# Patient Record
Sex: FEMALE | Race: WHITE | Employment: OTHER | URBAN - METROPOLITAN AREA
[De-identification: names, ages, dates, MRNs, and addresses within clinical notes are randomized per-mention and may not be internally consistent; named-entity substitution may affect disease eponyms.]

---

## 2017-01-12 ENCOUNTER — TELEPHONE (OUTPATIENT)
Dept: INTERNAL MEDICINE CLINIC | Age: 47
End: 2017-01-12

## 2017-01-12 DIAGNOSIS — Z00.00 PHYSICAL EXAM: Primary | ICD-10-CM

## 2017-01-26 ENCOUNTER — DOCUMENTATION ONLY (OUTPATIENT)
Dept: INTERNAL MEDICINE CLINIC | Age: 47
End: 2017-01-26

## 2017-01-31 ENCOUNTER — DOCUMENTATION ONLY (OUTPATIENT)
Dept: INTERNAL MEDICINE CLINIC | Age: 47
End: 2017-01-31

## 2017-05-14 DIAGNOSIS — G43.009 MIGRAINE WITHOUT AURA AND WITHOUT STATUS MIGRAINOSUS, NOT INTRACTABLE: ICD-10-CM

## 2017-05-15 ENCOUNTER — DOCUMENTATION ONLY (OUTPATIENT)
Dept: NEUROLOGY | Age: 47
End: 2017-05-15

## 2017-05-15 RX ORDER — PROPRANOLOL HYDROCHLORIDE 160 MG/1
CAPSULE, EXTENDED RELEASE ORAL
Qty: 30 CAP | Refills: 0 | Status: SHIPPED | OUTPATIENT
Start: 2017-05-15 | End: 2017-07-03 | Stop reason: SDUPTHER

## 2017-05-16 ENCOUNTER — TELEPHONE (OUTPATIENT)
Dept: INTERNAL MEDICINE CLINIC | Age: 47
End: 2017-05-16

## 2017-05-16 DIAGNOSIS — G43.009 MIGRAINE WITHOUT AURA AND WITHOUT STATUS MIGRAINOSUS, NOT INTRACTABLE: Primary | ICD-10-CM

## 2017-05-16 NOTE — TELEPHONE ENCOUNTER
PT wants  Dr to  referral to Dr Shaan Renteria for headaches.  She does not need an insurance referral

## 2017-05-23 ENCOUNTER — DOCUMENTATION ONLY (OUTPATIENT)
Dept: NEUROLOGY | Age: 47
End: 2017-05-23

## 2017-05-23 NOTE — PROGRESS NOTES
Pt did not need a new referral.  Did contact PCP to discuss symptoms. Made appointment with Dr. Tamica Devlin on May 24th.  Pt informed-tmb

## 2017-05-24 ENCOUNTER — OFFICE VISIT (OUTPATIENT)
Dept: NEUROLOGY | Age: 47
End: 2017-05-24

## 2017-05-24 VITALS
RESPIRATION RATE: 14 BRPM | TEMPERATURE: 98.8 F | DIASTOLIC BLOOD PRESSURE: 80 MMHG | SYSTOLIC BLOOD PRESSURE: 104 MMHG | HEART RATE: 87 BPM | OXYGEN SATURATION: 97 % | HEIGHT: 65 IN

## 2017-05-24 DIAGNOSIS — G43.711 INTRACTABLE CHRONIC MIGRAINE WITHOUT AURA AND WITH STATUS MIGRAINOSUS: ICD-10-CM

## 2017-05-24 DIAGNOSIS — G43.009 MIGRAINE WITHOUT AURA AND WITHOUT STATUS MIGRAINOSUS, NOT INTRACTABLE: Primary | ICD-10-CM

## 2017-05-24 RX ORDER — FLUTICASONE PROPIONATE 50 MCG
2 SPRAY, SUSPENSION (ML) NASAL DAILY
COMMUNITY
End: 2019-04-16

## 2017-05-24 NOTE — COMMUNICATION BODY
Re:  Jess Roy,Follow up visit     5/24/2017 10:08 AM      SSN: [id number]    Subjective:   Qiana Farley returns for follow up after her recent Botox injection for migraine. She noted a couple of rough weeks after the Botox injection, which is typical for her. Now she has a very mild headache. She's been getting about 1 headache per week after the Botox injections. She's been dragging her feet to re-inject for the headaches. Now a year out, she's getting 9-10 headaches per month, some of them multiple days per event. She's been getting greater than 15 headache days per month and they are disabling. She's noticed no improvement in the headaches with the increase in Inderal LA dosing. Medications:    Current Outpatient Prescriptions   Medication Sig Dispense Refill    HYDROCODONE/ACETAMINOPHEN (VICODIN PO) Take  by mouth. For current dental work      fluticasone (FLONASE) 50 mcg/actuation nasal spray 2 Sprays by Both Nostrils route daily.  propranolol LA (INDERAL LA) 160 mg capsule TAKE 1 CAPSULE BY MOUTH DAILY 30 Cap 0    DEXILANT 60 mg CpDB TAKE 1 CAPSULE BY MOUTH DAILY 90 Cap 3    ondansetron (ZOFRAN ODT) 4 mg disintegrating tablet Take 1-2 tablets every 6-8 hours as needed for nausea and vomiting. 10 Tab 0    rizatriptan (MAXALT) 10 mg tablet Take 1 Tab by mouth once as needed for Migraine for up to 1 dose. May repeat in 2 hours if needed 12 Tab 6    multivitamin (ONE A DAY) tablet Take 1 Tab by mouth daily.  PROGESTERONE 400 mg by Does Not Apply route. In the pm      Cetirizine 10 mg cap Take  by mouth.  zolpidem (AMBIEN) 10 mg tablet Take  by mouth nightly as needed for Sleep.  ibuprofen (MOTRIN) 200 mg tablet Take  by mouth.  TESTOSTERONE CREAM Apply 2.5 mg to affected area every other day. Testosterone Cream      estradiol (MINIVELLE) 0.0375 mg/24 hr 1 Patch by TransDERmal route every Monday and Friday.       OMEPRAZOLE MAGNESIUM (PRILOSEC OTC PO) Take  by mouth.  melatonin 3 mg tablet Take  by mouth.  thyroid, Pork, (ARMOUR THYROID) 60 mg tablet Take 60 mg by mouth daily.  docusate sodium (COLACE) 100 mg capsule Take 1,600 mg by mouth two (2) times a day. 6 in the am and 10 at night      CALCIUM PO Take 500 mg by mouth three (3) times daily.  Cholecalciferol, Vitamin D3, (VITAMIN D) 2,000 unit Cap Take 1,000 Units by mouth.  dextroamphetamine-amphetamine (ADDERALL) 10 mg tablet Take 10 mg by mouth.  acetaminophen (TYLENOL) 325 mg tablet Take  by mouth every four (4) hours as needed for Pain.  sertraline (ZOLOFT) 100 mg tablet Take 50 mg by mouth daily. Vital signs:    Visit Vitals    /80 (BP 1 Location: Right arm, BP Patient Position: Sitting)    Pulse 87    Temp 98.8 °F (37.1 °C) (Oral)    Resp 14    Ht 5' 5\" (1.651 m)    SpO2 97%       Review of Systems:   As above otherwise 11 point review of systems negative including;   Constitutional no fever or chills  Skin denies rash or itching  HEENT  Denies tinnitus, hearing lose  Eyes denies diplopia vision lose  Respiratory denies sortness of breath  Cardiovascular denies chest pain, dyspnea on exertion  Gastrointestinal denies nausea, vomiting, diarrhea, constipation  Genitourinary denies incontinence  Musculoskeletal denies joint pain or swelling  Endocrine denies weight change  Hematology denies easy bruising or bleeding   Neurological as above in HPI      Patient Active Problem List   Diagnosis Code    Bulimia Dr. Valerie Valentine G43.909    DJD knees Dr. Hiram Cardoza M19.90    Osteopenia 2/13 FRAX 3.0 and 0.3  M85.80    GERD without esophagitis K21.9    Bilateral leg edema R60.0    Personal history of tobacco use, presenting hazards to health Z87.891    Asymptomatic varicose veins I83.90         Objective: The patient is awake, alert, and oriented x 4. Fund of knowledge is adequate.   Speech is fluent and memory is intact. Cranial Nerves: II - Visual fields are full to confrontation. III, IV, VI - Extraocular movements are intact. There is no nystagmus. V - Facial sensation is intact to pinprick. VII - Face is symmetrical.  VIII - Hearing is present. IX, X, XII - Palate is symmetrical.   XI - Shoulder shrugging and head turning intact  Motor: The patient moves all four limbs fairly well and symmetrically. Tone is normal. Reflexes are 2+ and symmetrical. Plantars are down going. Gait is normal.    CBC:   Lab Results   Component Value Date/Time    WBC 5.5 06/30/2016 11:25 AM    RBC 4.00 06/30/2016 11:25 AM    HGB 12.5 06/30/2016 11:25 AM    HCT 39.4 06/30/2016 11:25 AM    PLATELET 805 03/93/2025 11:25 AM     BMP:   Lab Results   Component Value Date/Time    Glucose 79 06/30/2016 11:25 AM    Sodium 144 06/30/2016 11:25 AM    Potassium 4.5 06/30/2016 11:25 AM    Chloride 103 06/30/2016 11:25 AM    CO2 30 06/30/2016 11:25 AM    BUN 13 06/30/2016 11:25 AM    Creatinine 0.7 06/30/2016 11:25 AM    Calcium 9.3 06/30/2016 11:25 AM     CMP:   Lab Results   Component Value Date/Time    Glucose 79 06/30/2016 11:25 AM    Sodium 144 06/30/2016 11:25 AM    Potassium 4.5 06/30/2016 11:25 AM    Chloride 103 06/30/2016 11:25 AM    CO2 30 06/30/2016 11:25 AM    BUN 13 06/30/2016 11:25 AM    Creatinine 0.7 06/30/2016 11:25 AM    Calcium 9.3 06/30/2016 11:25 AM    Anion gap 11.0 06/30/2016 11:25 AM    BUN/Creatinine ratio 20 03/22/2013 11:10 AM    Alk. phosphatase 35 06/30/2016 11:25 AM    Protein, total 6.5 06/30/2016 11:25 AM    Albumin 4.6 06/30/2016 11:25 AM    Globulin 1.9 06/30/2016 11:25 AM    A-G Ratio 2.4 06/30/2016 11:25 AM     Coagulation: No results found for: PTP, INR, APTT, PTTT    Assessment:  Migraine, doing well with Botox injections and Inderal LA. Plan: Will switch from Imitrex to Maxalt per patient request.  Will re-inject with Botox at this time. Return 3 weeks after the injection. Sincerely,        Anuja Camejo.  Dot Hernandez M.D.

## 2017-05-24 NOTE — LETTER
5/24/2017 10:16 AM 
 
Patient:  Mariajose Weiss YOB: 1970 Date of Visit: 5/24/2017 Dear MD Adithya Arreola[address] [address] VIA In Basket 
 : Thank you for referring Ms. Eneida Wadsworth to me for evaluation/treatment. Below are the relevant portions of my assessment and plan of care. Re:  Sahil Roy,Follow up visit     5/24/2017 10:08 AM 
 
 
SSN: [id number] Subjective:   Mariajose Weiss returns for follow up after her recent Botox injection for migraine. She noted a couple of rough weeks after the Botox injection, which is typical for her. Now she has a very mild headache. She's been getting about 1 headache per week after the Botox injections. She's been dragging her feet to re-inject for the headaches. Now a year out, she's getting 9-10 headaches per month, some of them multiple days per event. She's been getting greater than 15 headache days per month and they are disabling. She's noticed no improvement in the headaches with the increase in Inderal LA dosing. Medications:   
Current Outpatient Prescriptions Medication Sig Dispense Refill  
 HYDROCODONE/ACETAMINOPHEN (VICODIN PO) Take  by mouth. For current dental work  fluticasone (FLONASE) 50 mcg/actuation nasal spray 2 Sprays by Both Nostrils route daily.  propranolol LA (INDERAL LA) 160 mg capsule TAKE 1 CAPSULE BY MOUTH DAILY 30 Cap 0  DEXILANT 60 mg CpDB TAKE 1 CAPSULE BY MOUTH DAILY 90 Cap 3  
 ondansetron (ZOFRAN ODT) 4 mg disintegrating tablet Take 1-2 tablets every 6-8 hours as needed for nausea and vomiting. 10 Tab 0  
 rizatriptan (MAXALT) 10 mg tablet Take 1 Tab by mouth once as needed for Migraine for up to 1 dose. May repeat in 2 hours if needed 12 Tab 6  
 multivitamin (ONE A DAY) tablet Take 1 Tab by mouth daily.  PROGESTERONE 400 mg by Does Not Apply route.  In the pm    
  Cetirizine 10 mg cap Take  by mouth.  zolpidem (AMBIEN) 10 mg tablet Take  by mouth nightly as needed for Sleep.  ibuprofen (MOTRIN) 200 mg tablet Take  by mouth.  TESTOSTERONE CREAM Apply 2.5 mg to affected area every other day. Testosterone Cream    
 estradiol (MINIVELLE) 0.0375 mg/24 hr 1 Patch by TransDERmal route every Monday and Friday.  OMEPRAZOLE MAGNESIUM (PRILOSEC OTC PO) Take  by mouth.  melatonin 3 mg tablet Take  by mouth.  thyroid, Pork, (ARMOUR THYROID) 60 mg tablet Take 60 mg by mouth daily.  docusate sodium (COLACE) 100 mg capsule Take 1,600 mg by mouth two (2) times a day. 6 in the am and 10 at night  CALCIUM PO Take 500 mg by mouth three (3) times daily.  Cholecalciferol, Vitamin D3, (VITAMIN D) 2,000 unit Cap Take 1,000 Units by mouth.  dextroamphetamine-amphetamine (ADDERALL) 10 mg tablet Take 10 mg by mouth.  acetaminophen (TYLENOL) 325 mg tablet Take  by mouth every four (4) hours as needed for Pain.  sertraline (ZOLOFT) 100 mg tablet Take 50 mg by mouth daily. Vital signs:   
Visit Vitals  /80 (BP 1 Location: Right arm, BP Patient Position: Sitting)  Pulse 87  Temp 98.8 °F (37.1 °C) (Oral)  Resp 14  
 Ht 5' 5\" (1.651 m)  SpO2 97% Review of Systems: As above otherwise 11 point review of systems negative including;  
Constitutional no fever or chills Skin denies rash or itching HEENT  Denies tinnitus, hearing lose Eyes denies diplopia vision lose Respiratory denies sortness of breath Cardiovascular denies chest pain, dyspnea on exertion Gastrointestinal denies nausea, vomiting, diarrhea, constipation Genitourinary denies incontinence Musculoskeletal denies joint pain or swelling Endocrine denies weight change Hematology denies easy bruising or bleeding Neurological as above in HPI Patient Active Problem List  
Diagnosis Code  Mariano Kerr Noss F50.2 Heriberto [place] Dr. Aleman retirement J18.784  DJD knees Dr. Ana Maria Alarcon M19.90  
 Osteopenia 2/13 FRAX 3.0 and 0.3  M85.80  GERD without esophagitis K21.9  Bilateral leg edema R60.0  Personal history of tobacco use, presenting hazards to health X30.035  Asymptomatic varicose veins I83.90 Objective: The patient is awake, alert, and oriented x 4. Fund of knowledge is adequate. Speech is fluent and memory is intact. Cranial Nerves: II  Visual fields are full to confrontation. III, IV, VI  Extraocular movements are intact. There is no nystagmus. V  Facial sensation is intact to pinprick. VII  Face is symmetrical.  VIII - Hearing is present. IX, X, XII  Palate is symmetrical.   XI - Shoulder shrugging and head turning intact Motor: The patient moves all four limbs fairly well and symmetrically. Tone is normal. Reflexes are 2+ and symmetrical. Plantars are down going. Gait is normal. 
 
CBC:  
Lab Results Component Value Date/Time WBC 5.5 06/30/2016 11:25 AM  
 RBC 4.00 06/30/2016 11:25 AM  
 HGB 12.5 06/30/2016 11:25 AM  
 HCT 39.4 06/30/2016 11:25 AM  
 PLATELET 792 50/06/7673 11:25 AM  
 
BMP:  
Lab Results Component Value Date/Time Glucose 79 06/30/2016 11:25 AM  
 Sodium 144 06/30/2016 11:25 AM  
 Potassium 4.5 06/30/2016 11:25 AM  
 Chloride 103 06/30/2016 11:25 AM  
 CO2 30 06/30/2016 11:25 AM  
 BUN 13 06/30/2016 11:25 AM  
 Creatinine 0.7 06/30/2016 11:25 AM  
 Calcium 9.3 06/30/2016 11:25 AM  
 
CMP:  
Lab Results Component Value Date/Time Glucose 79 06/30/2016 11:25 AM  
 Sodium 144 06/30/2016 11:25 AM  
 Potassium 4.5 06/30/2016 11:25 AM  
 Chloride 103 06/30/2016 11:25 AM  
 CO2 30 06/30/2016 11:25 AM  
 BUN 13 06/30/2016 11:25 AM  
 Creatinine 0.7 06/30/2016 11:25 AM  
 Calcium 9.3 06/30/2016 11:25 AM  
 Anion gap 11.0 06/30/2016 11:25 AM  
 BUN/Creatinine ratio 20 03/22/2013 11:10 AM  
 Alk.  phosphatase 35 06/30/2016 11:25 AM  
 Protein, total 6.5 06/30/2016 11:25 AM  
 Albumin 4.6 06/30/2016 11:25 AM  
 Globulin 1.9 06/30/2016 11:25 AM  
 A-G Ratio 2.4 06/30/2016 11:25 AM  
 
Coagulation: No results found for: PTP, INR, APTT, PTTT Assessment:  Migraine, doing well with Botox injections and Inderal LA. Plan: Will switch from Imitrex to Maxalt per patient request.  Will re-inject with Botox at this time. Return 3 weeks after the injection. Sincerely, 
 
 
 
Cholo Koroma. Greta Fajardo M.D. If you have questions, please do not hesitate to call me. I look forward to following Ms. Allie Sena along with you.  
 
 
 
Sincerely, 
 
 
Nguyen Nuñez MD

## 2017-05-24 NOTE — PROGRESS NOTES
Re:  Kyle Roy,Follow up visit     5/24/2017 10:08 AM      SSN: [id number]    Subjective:   Eva Layton returns for follow up after her recent Botox injection for migraine. She noted a couple of rough weeks after the Botox injection, which is typical for her. Now she has a very mild headache. She's been getting about 1 headache per week after the Botox injections. She's been dragging her feet to re-inject for the headaches. Now a year out, she's getting 9-10 headaches per month, some of them multiple days per event. She's been getting greater than 15 headache days per month and they are disabling. She's noticed no improvement in the headaches with the increase in Inderal LA dosing. Medications:    Current Outpatient Prescriptions   Medication Sig Dispense Refill    HYDROCODONE/ACETAMINOPHEN (VICODIN PO) Take  by mouth. For current dental work      fluticasone (FLONASE) 50 mcg/actuation nasal spray 2 Sprays by Both Nostrils route daily.  propranolol LA (INDERAL LA) 160 mg capsule TAKE 1 CAPSULE BY MOUTH DAILY 30 Cap 0    DEXILANT 60 mg CpDB TAKE 1 CAPSULE BY MOUTH DAILY 90 Cap 3    ondansetron (ZOFRAN ODT) 4 mg disintegrating tablet Take 1-2 tablets every 6-8 hours as needed for nausea and vomiting. 10 Tab 0    rizatriptan (MAXALT) 10 mg tablet Take 1 Tab by mouth once as needed for Migraine for up to 1 dose. May repeat in 2 hours if needed 12 Tab 6    multivitamin (ONE A DAY) tablet Take 1 Tab by mouth daily.  PROGESTERONE 400 mg by Does Not Apply route. In the pm      Cetirizine 10 mg cap Take  by mouth.  zolpidem (AMBIEN) 10 mg tablet Take  by mouth nightly as needed for Sleep.  ibuprofen (MOTRIN) 200 mg tablet Take  by mouth.  TESTOSTERONE CREAM Apply 2.5 mg to affected area every other day. Testosterone Cream      estradiol (MINIVELLE) 0.0375 mg/24 hr 1 Patch by TransDERmal route every Monday and Friday.       OMEPRAZOLE MAGNESIUM (PRILOSEC OTC PO) Take  by mouth.  melatonin 3 mg tablet Take  by mouth.  thyroid, Pork, (ARMOUR THYROID) 60 mg tablet Take 60 mg by mouth daily.  docusate sodium (COLACE) 100 mg capsule Take 1,600 mg by mouth two (2) times a day. 6 in the am and 10 at night      CALCIUM PO Take 500 mg by mouth three (3) times daily.  Cholecalciferol, Vitamin D3, (VITAMIN D) 2,000 unit Cap Take 1,000 Units by mouth.  dextroamphetamine-amphetamine (ADDERALL) 10 mg tablet Take 10 mg by mouth.  acetaminophen (TYLENOL) 325 mg tablet Take  by mouth every four (4) hours as needed for Pain.  sertraline (ZOLOFT) 100 mg tablet Take 50 mg by mouth daily. Vital signs:    Visit Vitals    /80 (BP 1 Location: Right arm, BP Patient Position: Sitting)    Pulse 87    Temp 98.8 °F (37.1 °C) (Oral)    Resp 14    Ht 5' 5\" (1.651 m)    SpO2 97%       Review of Systems:   As above otherwise 11 point review of systems negative including;   Constitutional no fever or chills  Skin denies rash or itching  HEENT  Denies tinnitus, hearing lose  Eyes denies diplopia vision lose  Respiratory denies sortness of breath  Cardiovascular denies chest pain, dyspnea on exertion  Gastrointestinal denies nausea, vomiting, diarrhea, constipation  Genitourinary denies incontinence  Musculoskeletal denies joint pain or swelling  Endocrine denies weight change  Hematology denies easy bruising or bleeding   Neurological as above in HPI      Patient Active Problem List   Diagnosis Code    Bulimia Dr. Marlene Aase Dr. [facility] G43.909    DJD knees Dr. Ana Maria Alarcon M19.90    Osteopenia 2/13 FRAX 3.0 and 0.3  M85.80    GERD without esophagitis K21.9    Bilateral leg edema R60.0    Personal history of tobacco use, presenting hazards to health Z87.891    Asymptomatic varicose veins I83.90         Objective: The patient is awake, alert, and oriented x 4. Fund of knowledge is adequate.   Speech is fluent and memory is intact. Cranial Nerves: II - Visual fields are full to confrontation. III, IV, VI - Extraocular movements are intact. There is no nystagmus. V - Facial sensation is intact to pinprick. VII - Face is symmetrical.  VIII - Hearing is present. IX, X, XII - Palate is symmetrical.   XI - Shoulder shrugging and head turning intact  Motor: The patient moves all four limbs fairly well and symmetrically. Tone is normal. Reflexes are 2+ and symmetrical. Plantars are down going. Gait is normal.    CBC:   Lab Results   Component Value Date/Time    WBC 5.5 06/30/2016 11:25 AM    RBC 4.00 06/30/2016 11:25 AM    HGB 12.5 06/30/2016 11:25 AM    HCT 39.4 06/30/2016 11:25 AM    PLATELET 804 81/98/5656 11:25 AM     BMP:   Lab Results   Component Value Date/Time    Glucose 79 06/30/2016 11:25 AM    Sodium 144 06/30/2016 11:25 AM    Potassium 4.5 06/30/2016 11:25 AM    Chloride 103 06/30/2016 11:25 AM    CO2 30 06/30/2016 11:25 AM    BUN 13 06/30/2016 11:25 AM    Creatinine 0.7 06/30/2016 11:25 AM    Calcium 9.3 06/30/2016 11:25 AM     CMP:   Lab Results   Component Value Date/Time    Glucose 79 06/30/2016 11:25 AM    Sodium 144 06/30/2016 11:25 AM    Potassium 4.5 06/30/2016 11:25 AM    Chloride 103 06/30/2016 11:25 AM    CO2 30 06/30/2016 11:25 AM    BUN 13 06/30/2016 11:25 AM    Creatinine 0.7 06/30/2016 11:25 AM    Calcium 9.3 06/30/2016 11:25 AM    Anion gap 11.0 06/30/2016 11:25 AM    BUN/Creatinine ratio 20 03/22/2013 11:10 AM    Alk. phosphatase 35 06/30/2016 11:25 AM    Protein, total 6.5 06/30/2016 11:25 AM    Albumin 4.6 06/30/2016 11:25 AM    Globulin 1.9 06/30/2016 11:25 AM    A-G Ratio 2.4 06/30/2016 11:25 AM     Coagulation: No results found for: PTP, INR, APTT, PTTT    Assessment:  Migraine, doing well with Botox injections and Inderal LA. Plan: Will switch from Imitrex to Maxalt per patient request.  Will re-inject with Botox at this time. Return 3 weeks after the injection. Sincerely,        Madelyn Aguillon M.D.

## 2017-06-12 ENCOUNTER — OFFICE VISIT (OUTPATIENT)
Dept: INTERNAL MEDICINE CLINIC | Age: 47
End: 2017-06-12

## 2017-06-12 VITALS
HEIGHT: 65 IN | SYSTOLIC BLOOD PRESSURE: 120 MMHG | TEMPERATURE: 98.7 F | OXYGEN SATURATION: 98 % | DIASTOLIC BLOOD PRESSURE: 70 MMHG | HEART RATE: 68 BPM

## 2017-06-12 DIAGNOSIS — M19.90 OSTEOARTHRITIS, UNSPECIFIED OSTEOARTHRITIS TYPE, UNSPECIFIED SITE: ICD-10-CM

## 2017-06-12 DIAGNOSIS — M85.80 OSTEOPENIA, UNSPECIFIED LOCATION: ICD-10-CM

## 2017-06-12 DIAGNOSIS — R07.89 RIGHT-SIDED CHEST WALL PAIN: Primary | ICD-10-CM

## 2017-06-12 NOTE — MR AVS SNAPSHOT
Visit Information Date & Time Provider Department Dept. Phone Encounter #  
 6/12/2017  4:00 PM Philly Verdema Internist of [address] [id number] Your Appointments 7/10/2017 10:30 AM  
PHYSICAL with Didi Ibrahim MD  
Internist of [address]) Appt Note: CPE  
 [address], [address] [facility] [address] [place] [address] [place]  
  
   
 [address], [facility] Upcoming Health Maintenance Date Due DTaP/Tdap/Td series (1 - Tdap) 10/8/1989 PAP AKA CERVICAL CYTOLOGY 3/17/2017 INFLUENZA AGE 9 TO ADULT 8/1/2017 COLONOSCOPY 9/19/2026 Allergies as of 6/12/2017  Review Complete On: 6/12/2017 By: VANESA Verde Severity Noted Reaction Type Reactions Lexapro [Escitalopram]  02/13/2012    Other (comments) Sexual prob Current Immunizations  Reviewed on 3/25/2013 Name Date Hepatitis B Vaccine 1/1/1999 Influenza Vaccine 1/9/2013 Influenza Vaccine PF 10/7/2014 Influenza Vaccine Whole 1/1/2009 Not reviewed this visit You Were Diagnosed With   
  
 Codes Comments Right-sided chest wall pain    -  Primary ICD-10-CM: R07.89 ICD-9-CM: 786.52 Vitals BP Pulse Temp Height(growth percentile) SpO2 OB Status 120/70 68 98.7 °F (37.1 °C) (Oral) 5' 5\" (1.651 m) 98% IUD Smoking Status Former Smoker Vitals History [phone] [facility] [address], [address] [phone] Your Updated Medication List  
  
   
This list is accurate as of: 6/12/17  4:36 PM.  Always use your most recent med list.  
  
  
  
  
 acetaminophen 325 mg tablet Commonly known as:  TYLENOL Take  by mouth every four (4) hours as needed for Pain. ARMOUR THYROID 60 mg tablet Generic drug:  thyroid (Pork) Take 60 mg by mouth daily. CALCIUM PO Take 500 mg by mouth three (3) times daily. Cetirizine 10 mg Cap Take  by mouth. COLACE 100 mg capsule Generic drug:  docusate sodium Take 1,600 mg by mouth two (2) times a day. 6 in the am and 10 at night DEXILANT 60 mg Cpdb Generic drug:  Dexlansoprazole TAKE 1 CAPSULE BY MOUTH DAILY  
  
 dextroamphetamine-amphetamine 10 mg tablet Commonly known as:  ADDERALL Take 10 mg by mouth. FLONASE 50 mcg/actuation nasal spray Generic drug:  fluticasone 2 Sprays by Both Nostrils route daily. ibuprofen 200 mg tablet Commonly known as:  MOTRIN Take  by mouth.  
  
 melatonin 3 mg tablet Take  by mouth. MINIVELLE 0.0375 mg/24 hr  
Generic drug:  estradiol 1 Patch by TransDERmal route every Monday and Friday. multivitamin tablet Commonly known as:  ONE A DAY Take 1 Tab by mouth daily. ondansetron 4 mg disintegrating tablet Commonly known as:  ZOFRAN ODT Take 1-2 tablets every 6-8 hours as needed for nausea and vomiting. PRILOSEC OTC PO Take  by mouth. PROGESTERONE  
400 mg by Does Not Apply route. In the pm  
  
 propranolol  mg capsule Commonly known as:  INDERAL LA  
TAKE 1 CAPSULE BY MOUTH DAILY  
  
 rizatriptan 10 mg tablet Commonly known as:  [place] Bolds Take 1 Tab by mouth once as needed for Migraine for up to 1 dose. May repeat in 2 hours if needed TESTOSTERONE CREAM  
Apply 2.5 mg to affected area every other day. Testosterone Cream  
  
 VITAMIN D 2,000 unit Cap capsule Generic drug:  Cholecalciferol (Vitamin D3) Take 1,000 Units by mouth. ZOLOFT 100 mg tablet Generic drug:  sertraline Take 50 mg by mouth daily. zolpidem 10 mg tablet Commonly known as:  AMBIEN Take  by mouth nightly as needed for Sleep. To-Do List   
 06/12/2017 Imaging:  XR RIBS BI W PA CHEST 4 VS   
  
  
 Please provide this summary of care documentation to your next provider. Your primary care clinician is listed as Cisco Rodríguez. If you have any questions after today's visit, please call [phone].

## 2017-06-14 ENCOUNTER — TELEPHONE (OUTPATIENT)
Dept: INTERNAL MEDICINE CLINIC | Age: 47
End: 2017-06-14

## 2017-06-14 NOTE — TELEPHONE ENCOUNTER
Chest and rib XR normal, no abnormalities noted. Consider treatment/therapy options as discussed at our visit or observe for now and f/u at her upcoming appt with Dr. Jalen Jeffrey.

## 2017-06-19 DIAGNOSIS — R07.89 RIGHT-SIDED CHEST WALL PAIN: ICD-10-CM

## 2017-06-27 DIAGNOSIS — G43.009 MIGRAINE WITHOUT AURA AND WITHOUT STATUS MIGRAINOSUS, NOT INTRACTABLE: ICD-10-CM

## 2017-06-30 ENCOUNTER — DOCUMENTATION ONLY (OUTPATIENT)
Dept: NEUROLOGY | Age: 47
End: 2017-06-30

## 2017-06-30 RX ORDER — PROPRANOLOL HYDROCHLORIDE 160 MG/1
CAPSULE, EXTENDED RELEASE ORAL
Qty: 30 CAP | Refills: 0 | OUTPATIENT
Start: 2017-06-30

## 2017-06-30 NOTE — TELEPHONE ENCOUNTER
Requested Prescriptions     Pending Prescriptions Disp Refills    propranolol LA (INDERAL LA) 160 mg capsule [Pharmacy Med Name: PROPRANOLOL ER 160MG CAPSULES] 30 Cap 0     Sig: TAKE 1 CAPSULE BY MOUTH DAILY       Last Filled: 5/15/17  Last Visit: 5/24/17

## 2017-07-03 RX ORDER — PROPRANOLOL HYDROCHLORIDE 160 MG/1
CAPSULE, EXTENDED RELEASE ORAL
Qty: 30 CAP | Refills: 0 | Status: SHIPPED | OUTPATIENT
Start: 2017-07-03 | End: 2017-07-25 | Stop reason: SDUPTHER

## 2017-07-03 NOTE — TELEPHONE ENCOUNTER
Requested Prescriptions     Pending Prescriptions Disp Refills    propranolol LA (INDERAL LA) 160 mg capsule 30 Cap 0     Refused Prescriptions Disp Refills    propranolol LA (INDERAL LA) 160 mg capsule [Pharmacy Med Name: PROPRANOLOL ER 160MG CAPSULES] 30 Cap 0     Sig: TAKE 1 CAPSULE BY MOUTH DAILY     Refused By: Mane MCCLELLAND     Reason for Refusal: Appt required, please call patient     Patient states she is out of medication

## 2017-07-10 ENCOUNTER — OFFICE VISIT (OUTPATIENT)
Dept: INTERNAL MEDICINE CLINIC | Age: 47
End: 2017-07-10

## 2017-07-17 NOTE — PROGRESS NOTES
48 y.o. WHITE OR  female who presents for evaluation. She had seen Dr Sowmay Ryan in late 2016 and doppler showed venous reflux. She has worn stockings in the past, dyazide did not seem to do much when she used them in the past.  Concerned as they will be going to [facility] in the near future and they do a lot of driving for their daughter's travel softball. No sob, pnd, orthopnea    She is due for f/u thyroid US. No sx to report    Past Medical History:   Diagnosis Date    Alcohol abuse     X2-3 yrs    Bulimia     X20 years Dr. Alisa Wang; Meg 2011(?)    Constipation     Degenerative arthritis of knee     knees Dr. Michael Barrett Degenerative disc disease, cervical 2009    C5-6 Dr Isak Sierra disorder     [address] Gastritis 12/12    GERD (gastroesophageal reflux disease) 2009    [facility], last EGD 12/12    H/O bone graft 05/22/2017    Dental bone graft for dental implant    Headache     Migraines Dr. Wyona Holter, saw Dr. Dante Spain also; now seeing Dr Saloni Felix for botox    Hydronephrosis of right kidney     Dr Carole Jean Hypothyroidism     Dr Keke Gomez (??)    Osteopenia 2/13 FRAX 3.0 and 0.3      DEXA t score 0.4 spine, -1.6 hip (2/13); -0.3 spine, -1.5 hip w FRAX 3.4/0.3 (4/15); w/u neg for secondary causes    PPD positive, treated     age 6    Sleep apnea 2011? on cpap although she's not using Dr. Salma Loja Thyroid nodule 2014    Dr Marybel Ridley left sided     Past Surgical History:   Procedure Laterality Date    HX ABDOMINOPLASTY  2004    and mastopexy, Dr. Chavez Button  12[address]    silicone Dr Melinda Johnston  8/12    Dr. Autumn Benitez 9/16 negative    HX GI  12/12    EGD w gastritis, h pylori neg Dr. Gómez Story HX GYN  [facility] X 2    IMPLANT BREAST [facility]      Saline 2004?     NEUROLOGICAL PROCEDURE UNLISTED      EMG negative Dr. Shayy Guan  4/16    MRI head negative    RENAL SCOPE,ENDOPYELOTOMY  1999    [place] right hydr[place]ph[place]     Current Outpatient Prescriptions   Medication Sig    furosemide (LASIX) 20 mg tablet Take once daily as needed for edema    potassium chloride (K-DUR, KLOR-CON) 20 mEq tablet Take once daily as needed w furosemide    rizatriptan (MAXALT) 10 mg tablet TAKE 1 TABLET BY MOUTH ONCE AS NEEDED FOR MIGRAINE FOR UP TO 1 DOSE, MAY REPEAT IN 2 HOURS IF NEEDED    propranolol LA (INDERAL LA) 160 mg capsule TAKE 1 CAPSULE BY MOUTH DAILY    fluticasone (FLONASE) 50 mcg/actuation nasal spray 2 Sprays by Both Nostrils route daily.  DEXILANT 60 mg CpDB TAKE 1 CAPSULE BY MOUTH DAILY    PROGESTERONE 400 mg by Does Not Apply route. In the pm    Cetirizine 10 mg cap Take  by mouth.  acetaminophen (TYLENOL) 325 mg tablet Take  by mouth every four (4) hours as needed for Pain.  ibuprofen (MOTRIN) 200 mg tablet Take  by mouth.  estradiol (MINIVELLE) 0.0375 mg/24 hr 1 Patch by TransDERmal route every Monday and Friday.  OMEPRAZOLE MAGNESIUM (PRILOSEC OTC PO) Take  by mouth.  melatonin 3 mg tablet Take  by mouth.  thyroid, Pork, (ARMOUR THYROID) 60 mg tablet Take 60 mg by mouth daily.  docusate sodium (COLACE) 100 mg capsule Take 1,600 mg by mouth two (2) times a day. 6 in the am and 10 at night    CALCIUM PO Take 500 mg by mouth three (3) times daily.  Cholecalciferol, Vitamin D3, (VITAMIN D) 2,000 unit Cap Take 1,000 Units by mouth. No current facility-administered medications for this visit. Allergies   Allergen Reactions    Lexapro [Escitalopram] Other (comments)     Sexual prob     Visit Vitals    /80 (BP 1 Location: Left arm, BP Patient Position: Sitting)    Pulse 60    Temp 98 °F (36.7 °C)    Ht 5' 6\" (1.676 m)    Wt 187 lb (84.8 kg)    SpO2 98%    BMI 30.18 kg/m2   A&O x3  Affect is appropriate. Mood stable  No apparent distress  Anicteric, no JVD, adenopathy or thyromegaly.    No carotid bruits or radiated murmur  Lungs clear to auscultation, no wheezes or rales  Heart showed regular rate and rhythm. No murmur, rubs, gallops  Abdomen soft nontender, no hepatosplenomegaly or masses. Extremities with 1+ edema ankles bilat. Pulses 1-2+ symmetrically    Assessment and plan:  1. Venous insufficiency. F/U Dr Darci Clemons as directed. Prn lasix w kdur given. New script for stockings, elevation and frequent stops when traveling  2. Asked her to call the travel medicine clinic in  to get her shots as we don't carry hep a  3. Thyroid nodule. Check us      Above conditions discussed at length and patient vocalized understanding.   All questions answered to patient satifaction

## 2017-07-18 DIAGNOSIS — G43.009 MIGRAINE WITHOUT AURA AND WITHOUT STATUS MIGRAINOSUS, NOT INTRACTABLE: ICD-10-CM

## 2017-07-18 RX ORDER — RIZATRIPTAN BENZOATE 10 MG/1
TABLET ORAL
Qty: 12 TAB | Refills: 0 | Status: SHIPPED | OUTPATIENT
Start: 2017-07-18 | End: 2017-10-30 | Stop reason: SDUPTHER

## 2017-07-18 NOTE — TELEPHONE ENCOUNTER
Patient called to see if Rx for Maxalt was sent to pharmacy. Patient states she is having migraines and doesn't want to run out.

## 2017-07-20 ENCOUNTER — OFFICE VISIT (OUTPATIENT)
Dept: INTERNAL MEDICINE CLINIC | Age: 47
End: 2017-07-20

## 2017-07-20 VITALS
SYSTOLIC BLOOD PRESSURE: 132 MMHG | HEIGHT: 66 IN | WEIGHT: 187 LBS | BODY MASS INDEX: 30.05 KG/M2 | HEART RATE: 60 BPM | TEMPERATURE: 98 F | OXYGEN SATURATION: 98 % | DIASTOLIC BLOOD PRESSURE: 80 MMHG

## 2017-07-20 DIAGNOSIS — R60.9 EDEMA, UNSPECIFIED TYPE: ICD-10-CM

## 2017-07-20 DIAGNOSIS — E04.1 THYROID NODULE: ICD-10-CM

## 2017-07-20 DIAGNOSIS — I87.2 VENOUS INSUFFICIENCY: Primary | ICD-10-CM

## 2017-07-20 RX ORDER — POTASSIUM CHLORIDE 20 MEQ/1
TABLET, EXTENDED RELEASE ORAL
Qty: 30 TAB | Refills: 6 | Status: SHIPPED | OUTPATIENT
Start: 2017-07-20 | End: 2019-06-18 | Stop reason: SDUPTHER

## 2017-07-20 RX ORDER — FUROSEMIDE 20 MG/1
TABLET ORAL
Qty: 30 TAB | Refills: 6 | Status: SHIPPED | OUTPATIENT
Start: 2017-07-20 | End: 2019-06-18 | Stop reason: SDUPTHER

## 2017-07-20 NOTE — MR AVS SNAPSHOT
Visit Information Date & Time Provider Department Dept. Phone Encounter #  
 7/20/2017  9:30 AM Leander Bal MD Internist of [address] [place] [address] [id number] Your Appointments 7/25/2017  9:00 AM  
Follow Up with Fabienne Ballard MD  
[address] [facility]) Appt Note: BOTOX  UNITS  
 [address] [address] [phone]  
  
   
 [place] [id number]  
  
    
 1/2/2018  8:20 AM  
PHYSICAL with Leander Bal MD  
[facility] Appt Note: CPE; pt r/s 07/10/17 appt   syb 07/07/17  
 [address] [facility], [address] [address] [address]  
  
   
 [address] [facility], [address] Upcoming Health Maintenance Date Due DTaP/Tdap/Td series (1 - Tdap) 10/8/1989 PAP AKA CERVICAL CYTOLOGY 3/17/2017 INFLUENZA AGE 9 TO ADULT 8/1/2017 COLONOSCOPY 9/19/2026 Allergies as of 7/20/2017  Review Complete On: 7/20/2017 By: Tello Vela LPN Severity Noted Reaction Type Reactions Lexapro [Escitalopram]  02/13/2012    Other (comments) Sexual prob Current Immunizations  Reviewed on 3/25/2013 Name Date Hepatitis B Vaccine 1/1/1999 Influenza Vaccine 1/9/2013 Influenza Vaccine PF 10/7/2014 Influenza Vaccine Whole 1/1/2009 Not reviewed this visit Vitals BP Pulse Temp Height(growth percentile) Weight(growth percentile) SpO2  
 132/80 (BP 1 Location: Left arm, BP Patient Position: Sitting) 60 98 °F (36.7 °C) 5' 6\" (1.676 m) 187 lb (84.8 kg) 98% BMI OB Status Smoking Status 30.18 kg/m2 IUD Former Smoker Vitals History BMI and BSA Data Body Mass Index Body Surface Area  
 30.18 kg/m 2 1.99 m 2 Preferred Pharmacy Pharmacy Name Phone  [phone] [address] [facility] [address] [facility] [address] [facility] Your Updated Medication List  
  
   
This list is accurate as of: 7/20/17 10:19 AM.  Always use your most recent med list.  
  
  
  
  
 acetaminophen 325 mg tablet Commonly known as:  TYLENOL Take  by mouth every four (4) hours as needed for Pain. ARMOUR THYROID 60 mg tablet Generic drug:  thyroid (Pork) Take 60 mg by mouth daily. CALCIUM PO Take 500 mg by mouth three (3) times daily. Cetirizine 10 mg Cap Take  by mouth. COLACE 100 mg capsule Generic drug:  docusate sodium Take 1,600 mg by mouth two (2) times a day. 6 in the am and 10 at night DEXILANT 60 mg Cpdb Generic drug:  Dexlansoprazole TAKE 1 CAPSULE BY MOUTH DAILY  
  
 FLONASE 50 mcg/actuation nasal spray Generic drug:  fluticasone 2 Sprays by Both Nostrils route daily. ibuprofen 200 mg tablet Commonly known as:  MOTRIN Take  by mouth.  
  
 melatonin 3 mg tablet Take  by mouth. MINIVELLE 0.0375 mg/24 hr  
Generic drug:  estradiol 1 Patch by TransDERmal route every Monday and Friday. PRILOSEC OTC PO Take  by mouth. PROGESTERONE  
400 mg by Does Not Apply route. In the pm  
  
 propranolol  mg capsule Commonly known as:  INDERAL LA  
TAKE 1 CAPSULE BY MOUTH DAILY  
  
 rizatriptan 10 mg tablet Commonly known as:  Jovany [place] TAKE 1 TABLET BY MOUTH ONCE AS NEEDED FOR MIGRAINE FOR UP TO 1 DOSE, MAY REPEAT IN 2 HOURS IF NEEDED  
  
 VITAMIN D 2,000 unit Cap capsule Generic drug:  Cholecalciferol (Vitamin D3) Take 1,000 Units by mouth. Please provide this summary of care documentation to your next provider. Your primary care clinician is listed as Mike Aiken. If you have any questions after today's visit, please call [phone].

## 2017-07-25 ENCOUNTER — OFFICE VISIT (OUTPATIENT)
Dept: NEUROLOGY | Age: 47
End: 2017-07-25

## 2017-07-25 VITALS
SYSTOLIC BLOOD PRESSURE: 120 MMHG | OXYGEN SATURATION: 98 % | RESPIRATION RATE: 18 BRPM | HEIGHT: 66 IN | TEMPERATURE: 98.8 F | HEART RATE: 61 BPM | DIASTOLIC BLOOD PRESSURE: 80 MMHG

## 2017-07-25 DIAGNOSIS — G43.009 MIGRAINE WITHOUT AURA AND WITHOUT STATUS MIGRAINOSUS, NOT INTRACTABLE: ICD-10-CM

## 2017-07-25 DIAGNOSIS — G43.719 INTRACTABLE CHRONIC MIGRAINE WITHOUT AURA AND WITHOUT STATUS MIGRAINOSUS: Primary | ICD-10-CM

## 2017-07-25 RX ORDER — PROPRANOLOL HYDROCHLORIDE 160 MG/1
CAPSULE, EXTENDED RELEASE ORAL
Qty: 30 CAP | Refills: 5 | Status: SHIPPED | OUTPATIENT
Start: 2017-07-25 | End: 2018-01-31 | Stop reason: SDUPTHER

## 2017-07-25 NOTE — PROGRESS NOTES
Botox Procedure    Indications:  Encounter Diagnoses   Name Primary?  Migraine without aura and without status migrainosus, not intractable       Last injection was several years ago, with relief, and now has a recurrence of pain. Procedure: The medication was injected without EMG guidance as follows:    Botox was prepared in the usual fashion using 4 mL of preservative-free normal saline into the 200 unit vial.  I injected the patient with 155 units of Botox in the prescribed areas for migraine treatment. I wasted a total of 45 units of Botox. Outpatient Encounter Prescriptions as of 7/25/2017   Medication Sig Dispense Refill    propranolol LA (INDERAL LA) 160 mg capsule TAKE 1 CAPSULE BY MOUTH DAILY 30 Cap 5    furosemide (LASIX) 20 mg tablet Take once daily as needed for edema 30 Tab 6    potassium chloride (K-DUR, KLOR-CON) 20 mEq tablet Take once daily as needed w furosemide 30 Tab 6    rizatriptan (MAXALT) 10 mg tablet TAKE 1 TABLET BY MOUTH ONCE AS NEEDED FOR MIGRAINE FOR UP TO 1 DOSE, MAY REPEAT IN 2 HOURS IF NEEDED 12 Tab 0    fluticasone (FLONASE) 50 mcg/actuation nasal spray 2 Sprays by Both Nostrils route daily.  DEXILANT 60 mg CpDB TAKE 1 CAPSULE BY MOUTH DAILY 90 Cap 3    PROGESTERONE 400 mg by Does Not Apply route. In the pm      Cetirizine 10 mg cap Take  by mouth.  acetaminophen (TYLENOL) 325 mg tablet Take  by mouth every four (4) hours as needed for Pain.  ibuprofen (MOTRIN) 200 mg tablet Take  by mouth.  estradiol (MINIVELLE) 0.0375 mg/24 hr 1 Patch by TransDERmal route every Monday and Friday.  OMEPRAZOLE MAGNESIUM (PRILOSEC OTC PO) Take  by mouth.  melatonin 3 mg tablet Take  by mouth.  thyroid, Pork, (ARMOUR THYROID) 60 mg tablet Take 60 mg by mouth daily.  docusate sodium (COLACE) 100 mg capsule Take 1,600 mg by mouth two (2) times a day. 6 in the am and 10 at night      CALCIUM PO Take 500 mg by mouth three (3) times daily.         Cholecalciferol, Vitamin D3, (VITAMIN D) 2,000 unit Cap Take 1,000 Units by mouth.  [DISCONTINUED] propranolol LA (INDERAL LA) 160 mg capsule TAKE 1 CAPSULE BY MOUTH DAILY 30 Cap 0     No facility-administered encounter medications on file as of 7/25/2017.          The procedure was tolerated well with no complications     [facility]  OFFICE PROCEDURE PROGRESS NOTE        Chart reviewed for the following:   Shayla Acosta MD, have reviewed the History, Physical and updated the Allergic reactions for [address] performed immediately prior to start of procedure:   Shayla Acosta MD, have performed the following reviews on Sharri Ruiz prior to the start of the procedure:            * Patient was identified by name and date of birth   * Agreement on procedure being performed was verified  * Risks and Benefits explained to the patient  * Procedure site verified and marked as necessary  * Patient was positioned for comfort  * Consent was signed and verified     Time: 11:58 AM    Date of procedure: 7/25/2017    Procedure performed by:  Cristopher Loza MD    Patient assisted by: self    How tolerated by patient: tolerated the procedure well with no complications    Post Procedural Pain Scale: 0 - No Hurt    Comments: none

## 2017-08-07 ENCOUNTER — DOCUMENTATION ONLY (OUTPATIENT)
Dept: NEUROLOGY | Age: 47
End: 2017-08-07

## 2017-08-07 NOTE — PROGRESS NOTES
Received request from insurance for encounter notes from Botox visit on 7/25/17. Requested notes via mail. Notes with copy of request placed in mail. Letter placed in scanning folder.

## 2017-08-21 ENCOUNTER — HOSPITAL ENCOUNTER (OUTPATIENT)
Dept: ULTRASOUND IMAGING | Age: 47
Discharge: HOME | End: 2017-08-21
Attending: INTERNAL MEDICINE
Payer: COMMERCIAL

## 2017-08-21 DIAGNOSIS — E04.1 THYROID NODULE: ICD-10-CM

## 2017-08-21 PROCEDURE — 76536 US EXAM OF HEAD AND NECK: CPT

## 2017-08-23 ENCOUNTER — TELEPHONE (OUTPATIENT)
Dept: INTERNAL MEDICINE CLINIC | Age: 47
End: 2017-08-23

## 2017-08-23 NOTE — TELEPHONE ENCOUNTER
pls call    There is a very small noncalcified stable 4 mm round smoothly bordered nodule  inferior lateral pole.             Impression             IMPRESSION: Stable solitary small rounded hypoechoic nodule lateral inferior  left lobe of the thyroid.  Thyroid otherwise appears unremarkable          Stable nodule recheck 1-2 years

## 2017-10-30 DIAGNOSIS — G43.009 MIGRAINE WITHOUT AURA AND WITHOUT STATUS MIGRAINOSUS, NOT INTRACTABLE: ICD-10-CM

## 2017-10-30 RX ORDER — RIZATRIPTAN BENZOATE 10 MG/1
TABLET ORAL
Qty: 12 TAB | Refills: 0 | Status: SHIPPED | OUTPATIENT
Start: 2017-10-30 | End: 2018-01-01 | Stop reason: SDUPTHER

## 2018-01-01 DIAGNOSIS — G43.009 MIGRAINE WITHOUT AURA AND WITHOUT STATUS MIGRAINOSUS, NOT INTRACTABLE: ICD-10-CM

## 2018-01-08 RX ORDER — RIZATRIPTAN BENZOATE 10 MG/1
TABLET ORAL
Qty: 12 TAB | Refills: 0 | Status: SHIPPED | OUTPATIENT
Start: 2018-01-08 | End: 2018-04-18 | Stop reason: SDUPTHER

## 2018-01-08 NOTE — TELEPHONE ENCOUNTER
Requested Prescriptions     Pending Prescriptions Disp Refills    rizatriptan (MAXALT) 10 mg tablet [Pharmacy Med Name: RIZATRIPTAN 10MG TABLETS] 12 Tab 0     Sig: TAKE 1 TABLET BY MOUTH 1 TIME AS NEEDED FOR MIGRAINE. MAY REPEAT IN 2 HOURS AS NEEDED     Faxed request rec'd and scanned to chart

## 2018-01-25 ENCOUNTER — TELEPHONE (OUTPATIENT)
Dept: INTERNAL MEDICINE CLINIC | Age: 48
End: 2018-01-25

## 2018-03-19 ENCOUNTER — TELEPHONE (OUTPATIENT)
Dept: INTERNAL MEDICINE CLINIC | Age: 48
End: 2018-03-19

## 2018-03-19 DIAGNOSIS — Z00.00 ROUTINE GENERAL MEDICAL EXAMINATION AT A HEALTH CARE FACILITY: Primary | ICD-10-CM

## 2018-03-19 DIAGNOSIS — E04.1 THYROID NODULE: ICD-10-CM

## 2018-03-19 LAB
A-G RATIO,AGRAT: 1.7 RATIO (ref 1.1–2.6)
ALBUMIN SERPL-MCNC: 4.6 G/DL (ref 3.5–5)
ALP SERPL-CCNC: 50 U/L (ref 25–115)
ALT SERPL-CCNC: 16 U/L (ref 5–40)
ANION GAP SERPL CALC-SCNC: 9.4 MMOL/L
AST SERPL W P-5'-P-CCNC: 15 U/L (ref 10–37)
BILIRUB SERPL-MCNC: 0.4 MG/DL (ref 0.2–1.2)
BILIRUB UR QL: NEGATIVE
BUN SERPL-MCNC: 15 MG/DL (ref 6–22)
CALCIUM SERPL-MCNC: 9.7 MG/DL (ref 8.4–10.5)
CHLORIDE SERPL-SCNC: 104 MMOL/L (ref 98–110)
CHOLEST SERPL-MCNC: 171 MG/DL (ref 110–200)
CO2 SERPL-SCNC: 30 MMOL/L (ref 20–32)
CREAT SERPL-MCNC: 0.6 MG/DL (ref 0.5–1.2)
EPITHELIAL,EPSU: NORMAL /HPF (ref 0–2)
ERYTHROCYTE [DISTWIDTH] IN BLOOD BY AUTOMATED COUNT: 12.9 % (ref 10–15.5)
GFRAA, 66117: >60
GFRNA, 66118: >60
GLOBULIN,GLOB: 2.7 G/DL (ref 2–4)
GLUCOSE SERPL-MCNC: 94 MG/DL (ref 70–99)
GLUCOSE UR QL: NEGATIVE MG/DL
HCT VFR BLD AUTO: 38.5 % (ref 35.1–48)
HDLC SERPL-MCNC: 2.9 MG/DL (ref 0–5)
HDLC SERPL-MCNC: 60 MG/DL (ref 40–59)
HGB BLD-MCNC: 13.2 G/DL (ref 11.7–16)
HGB UR QL STRIP: NEGATIVE
KETONES UR QL STRIP.AUTO: NEGATIVE MG/DL
LDLC SERPL CALC-MCNC: 91 MG/DL (ref 50–99)
LEUKOCYTE ESTERASE: NEGATIVE
MCH RBC QN AUTO: 31 PG (ref 26–34)
MCHC RBC AUTO-ENTMCNC: 34 G/DL (ref 31–36)
MCV RBC AUTO: 92 FL (ref 80–95)
NITRITE UR QL STRIP.AUTO: NEGATIVE
PH UR STRIP: 6.5 PH (ref 5–8)
PLATELET # BLD AUTO: 304 K/UL (ref 140–440)
PMV BLD AUTO: 9.3 FL (ref 9–13)
POTASSIUM SERPL-SCNC: 4.2 MMOL/L (ref 3.5–5.5)
PROT SERPL-MCNC: 7.3 G/DL (ref 6.4–8.3)
PROT UR QL STRIP: NEGATIVE MG/DL
RBC # BLD AUTO: 4.2 M/UL (ref 3.8–5.2)
RBC #/AREA URNS HPF: NORMAL /HPF
SODIUM SERPL-SCNC: 143 MMOL/L (ref 133–145)
SP GR UR: 1.01 (ref 1–1.03)
TRIGL SERPL-MCNC: 97 MG/DL (ref 40–149)
TSH SERPL DL<=0.005 MIU/L-ACNC: 0.21 MCU/ML (ref 0.27–4.2)
UROBILINOGEN UR STRIP-MCNC: 0.2 MG/DL
VLDLC SERPL CALC-MCNC: 19 MG/DL (ref 8–30)
WBC # BLD AUTO: 5.9 K/UL (ref 4–11)
WBC URNS QL MICRO: NORMAL /HPF (ref 0–2)

## 2018-03-19 NOTE — TELEPHONE ENCOUNTER
Pt is coming to get an updated lab order sheet for her labs dated 01/2017, pls update she is coming today, RD

## 2018-03-22 NOTE — PROGRESS NOTES
50 y.o. WHITE OR  female who presents for RPE    She continues to be followed by Dr. Stephen Villalobos for bulimia and etoh abuse. Remains abstinent for years now    She has gained a great deal of weight but forbade discussing it today. Admits to eating too much and not exercising much    Vitals 3/23/2018 7/25/2017 7/20/2017 6/12/2017 5/24/2017 9/2/2016   Weight 200 lb  187 lb (No Data) (No Data) 163 lb     Vitals 7/14/2016 7/5/2016   Weight  163 lb     Tries to walk the dog about 20 min usually but this is inconsistent. No cardiovascular complaints     She is now seeing Dr Jey Chacon for the GERD. Needs dexilant refill. No alarm sx. The constipation is responding to current approach    No Gi or  sx, saw Dr Rachel Alberto last year and had mammo. She treats the vit d and is managing the armour thyroid also    The botox has helped the migraines and she continues to see Dr Perry Nuñez for this    She is interested in getting opinion from different neurologist for memory loss. She's very concerned as she 'forgets everything'. She forgets conversations, doesn;t trust herself to drive places she's been to before, does not remember simple things, feels that she is unable to work because of this. No sx referable to the thyroid, wants us to continue getting yearly US for the nodule    She has a rash she self treated with lotrimin between the breasts and it is resolving    She wants labs and xray for generalized arthralgias in the hands, shoulders, hips. No overt swelling, dec rom, she is known to have osteoarthritis in the spine and knees    Past Medical History:   Diagnosis Date    Alcohol abuse     X2-3 yrs    Bulimia     X20 years Dr. Stephen Villalobos;   Bhat-Bautista 2011(?)    Constipation     Degenerative arthritis of knee     knees Dr. Anurag Nichole Degenerative disc disease, cervical 2009    C5-6 Dr Leonard Rinluz disorder     [address] Gastritis 12/12    GERD (gastroesophageal reflux disease) 2009 Winifred Man, last EGD 12/12    H/O bone graft 05/22/2017    Dental bone graft for dental implant    Headache     Migraines Dr. Skylar Rodriguez, saw Dr. Lalo Thompson also; now seeing Dr Olena Sierra for botox    Hydronephrosis of right kidney     Dr Buenrostro Body Hypothyroidism     Dr Brendon Perla (??)    Osteopenia 2/13 FRAX 3.0 and 0.3      DEXA t score 0.4 spine, -1.6 hip (2/13); -0.3 spine, -1.5 hip w FRAX 3.4/0.3 (4/15); w/u neg for secondary causes    PPD positive, treated     age 6    Sleep apnea 2011? on cpap although she's not using Dr. Gume Layton Thyroid nodule     Dr Abdoul Youssef; left 5mm 2014; no change 2015, 2016, 8/17    Venous insufficiency 07/2017     Past Surgical History:   Procedure Laterality Date    HX ABDOMINOPLASTY  2004    and mastopexy, Dr. Migue Daniels  [address]    silicone Dr Babs Adams  8/12    Dr. Dennie Plank      [facility] 9/16 negative    HX GI  12/12    EGD w gastritis, h pylori neg Dr. María Dean [facility]    IMPLANT BREAST [facility]      Saline 2004?     NEUROLOGICAL PROCEDURE UNLISTED      EMG negative Dr. 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     Social History Main Topics    Smoking status: Former Smoker     Quit date: 1/1/1987    Smokeless tobacco: Never Used    Alcohol use 0.0 oz/week     0 Standard drinks or equivalent per week      Comment: abuse-has not drank in four years    Drug use: No      Comment: Former use a long time ago   [facility] Sexual activity: Not on file     Other Topics Concern    Not on file     Social History Narrative     Family History   Problem Relation Age of Onset    Diabetes Mother    [facility] Hypertension Mother     Alcohol abuse Mother     Liver Disease Mother     Depression Sister     Obesity Sister     Cancer Maternal Grandmother      lung    Heart Disease Maternal Grandmother      Immunization History   Administered Date(s) Administered    Hepatitis B Vaccine 01/01/1999    Influenza Vaccine 01/09/2013    Influenza Vaccine PF 10/07/2014    Influenza Vaccine Whole 01/01/2009     Allergies   Allergen Reactions    Lexapro [Escitalopram] Other (comments)     Sexual prob     Current Outpatient Prescriptions on File Prior to Visit   Medication Sig Dispense Refill    propranolol LA (INDERAL LA) 160 mg capsule TAKE 1 CAPSULE BY MOUTH DAILY 30 Cap 5    rizatriptan (MAXALT) 10 mg tablet TAKE 1 TABLET BY MOUTH 1 TIME AS NEEDED FOR MIGRAINE. MAY REPEAT IN 2 HOURS AS NEEDED 12 Tab 0    furosemide (LASIX) 20 mg tablet Take once daily as needed for edema 30 Tab 6    potassium chloride (K-DUR, KLOR-CON) 20 mEq tablet Take once daily as needed w furosemide 30 Tab 6    fluticasone (FLONASE) 50 mcg/actuation nasal spray 2 Sprays by Both Nostrils route daily.  DEXILANT 60 mg CpDB TAKE 1 CAPSULE BY MOUTH DAILY 90 Cap 3    PROGESTERONE 400 mg by Does Not Apply route. In the pm      Cetirizine 10 mg cap Take  by mouth.  acetaminophen (TYLENOL) 325 mg tablet Take  by mouth every four (4) hours as needed for Pain.  ibuprofen (MOTRIN) 200 mg tablet Take  by mouth.  estradiol (MINIVELLE) 0.0375 mg/24 hr 1 Patch by TransDERmal route every Monday and Friday.  OMEPRAZOLE MAGNESIUM (PRILOSEC OTC PO) Take  by mouth.  melatonin 3 mg tablet Take  by mouth.  thyroid, Pork, (ARMOUR THYROID) 60 mg tablet Take 60 mg by mouth daily.  docusate sodium (COLACE) 100 mg capsule Take 1,600 mg by mouth two (2) times a day. 6 in the am and 10 at night      CALCIUM PO Take 500 mg by mouth three (3) times daily.         Cholecalciferol, Vitamin D3, (VITAMIN D) 2,000 unit Cap Take 1,000 Units by mouth. No current facility-administered medications on file prior to visit. REVIEW OF SYSTEMS: gyn Dr. Nafisa Cain 1/15, mammo 2015   Ophtho  no vision change or eye pain  Oral  no mouth pain, tongue or tooth problems  Ears  no hearing loss, ear pain, fullness  Throat  no swallowing problems  Cardiac  no CP, PND, orthopnea, edema, palpitations or syncope  Chest  no breast masses  Resp  no wheezing, chronic coughing, dyspnea  Urinary  no dysuria, hematuria, flank pain, urgency, frequencybowel habits, bleeding, hemorrhoids  Derm  no nail abnormalities, rashes, lesions of note, hair loss  Constitutional  no wt loss, night sweats, unexplained fevers  Neuro  no focal weakness, numbness, paresthesias, incoordination, ataxia, involuntary movements    There were no vitals taken for this visit. Affect is appropriate. Mood stable  No apparent distress  HEENT --Anicteric sclerae, tympanic membranes normal,  ear canals normal.  PERRL, EOMI, conjunctiva and lids normal.  Disks were sharp  Sinuses were nontender, turbinates normal, hearing normal.  Oropharynx without  erythema, normal tongue, oral mucosa and tonsils. No thyromegaly, JVD, or bruits. Lungs --Clear to auscultation, normal percussion. Heart --Regular rate and rhythm, no murmurs, rubs, gallops, or clicks. Chest wall --Nontender to palpation. PMI normal.  Abdomen -- Soft and nontender, no hepatosplenomegaly or masses. Extremities -- Without cyanosis, clubbing. 2+ pulses equally and bilaterally.     LABS:  From 2/12 showed gluc 80,   cr 0.60,  alt 29, chol 156, tg 80, hdl 59, ldl-c 89,           vit d 47.0  From 3/13 showed gluc 78,   cr 0.88,   alt 24, chol 153, tg 64, hdl 62, ldl-c 78,   wbc 5.6, hb 13.3, plt 334, tsh 1.30, ua neg  From 4/13 showed                    vit d 54.1, spep neg, pth 19  From 3/14 showed gluc 87,   cr 0.80, gfr>60, alt 25, chol 154, tg 51, hdl 92, ldl-c 85,   wbc 6.1, hb 13.3, plt 331  From 3/15 showed gluc 102, cr 0.60, gfr>60, alt 10, chol 161, tg 73, hdl 55, ldl-c 92,   wbc 6.8, hb 13.4, plt 296, tsh 0.88, vit d 69.5  From 6/16 showed gluc 79,   cr 0.70, gfr>60, alt 12, chol 196, tg 93, hdl 62, ldl-c 116, wbc 5.5, hb 12.5, plt 336, tsh 0.41    Results for orders placed or performed in visit on 03/19/18   URINALYSIS W/MICROSCOPIC   Result Value Ref Range    Specific Gravity 1.010 1.005 - 1.03    pH (UA) 6.5 5.0 - 8.0 pH    Protein Negative Negative, mg/dL    Glucose Negative Negative mg/dL    Ketone Negative Negative mg/dL    Bilirubin Negative Negative    Blood Negative Negative    Nitrites Negative Negative    Leukocyte Esterase Negative Negative    Urobilinogen 0.2 0.2-1.0 EU mg/dL    WBC 0-2 0 - 2 /hpf    RBC 0-2 Negative, /hpf    Epithelial cells 0-2 0 - 2 /hpf   CBC W/O DIFF   Result Value Ref Range    WBC 5.9 4.0 - 11.0 K/uL    RBC 4.20 3.80 - 5.20 M/uL    HGB 13.2 11.7 - 16.0 g/dL    HCT 38.5 35.1 - 48.0 %    MCV 92 80 - 95 fL    MCH 31 26 - 34 pg    MCHC 34 31 - 36 g/dL    RDW 12.9 10.0 - 15.5 %    PLATELET 442 930 - 642 K/uL    MPV 9.3 9.0 - 13.0 fL   TSH 3RD GENERATION   Result Value Ref Range    TSH 0.21 (L) 0.27 - 8.68 mcU/mL   METABOLIC PANEL, COMPREHENSIVE   Result Value Ref Range    Glucose 94 70 - 99 mg/dL    BUN 15 6 - 22 mg/dL    Creatinine 0.6 0.5 - 1.2 mg/dL    Sodium 143 133 - 145 mmol/L    Potassium 4.2 3.5 - 5.5 mmol/L    Chloride 104 98 - 110 mmol/L    CO2 30 20 - 32 mmol/L    AST (SGOT) 15 10 - 37 U/L    ALT (SGPT) 16 5 - 40 U/L    Alk.  phosphatase 50 25 - 115 U/L    Bilirubin, total 0.4 0.2 - 1.2 mg/dL    Calcium 9.7 8.4 - 10.5 mg/dL    Protein, total 7.3 6.4 - 8.3 g/dL    Albumin 4.6 3.5 - 5.0 g/dL    A-G Ratio 1.7 1.1 - 2.6 ratio    Globulin 2.7 2.0 - 4.0 g/dL    Anion gap 9.4 mmol/L    GFRAA >60.0 >60.0    GFRNA >60.0 >60.0   LIPID PANEL   Result Value Ref Range    Triglyceride 97 40 - 149 mg/dL    HDL Cholesterol 60 (H) 40 - 59 mg/dL    Cholesterol, total 171 110 - 200 mg/dL    CHOLESTEROL/HDL 2.9 0.0 - 5.0    LDL, calculated 91 50 - 99 mg/dL    VLDL, calculated 19 8 - 30 mg/dL     Patient Active Problem List   Diagnosis Code    Bulimia Dr. Lafaye Ganser Dr. Georgette Sergeant G43.909    DJD knees Dr. Laura Muse M19.90    Osteopenia 2/13 FRAX 3.0 and 0.3  M85.80    GERD without esophagitis K21.9    Bilateral leg edema R60.0    Personal history of tobacco use, presenting hazards to health Z87.891    Asymptomatic varicose veins I83.90    Thyroid nodule E04.1     ASSESSMENT AND PLAN:  1. Psychiatric. F/U Dr. General Pham  2. Migraines. Continue current and f/u Dr. Silvina Polk  3. Thyroid nodule. US to be done later in the year  4. GERD and constipation. F/U Dr Laura Muse  5. Osteopenia. On ca/d, encouraged wt bearing exercises  6. Edema. Prn dyazide  7. Endocrine/gyn. F/U Dr. Stephy Wong  8. Arthralgia. Check labs and xrays, rheum as needed  9. Memory issues. Second opinion Dr Jay Hall' group, check labs, held off on imaging  10. Rash. Lotrimin as she is doing   6. Obesity. Declined any discussion whatsoever        RTC 3/19    Above conditions discussed at length and patient vocalized understanding.   All questions answered to patient satifaction

## 2018-03-23 ENCOUNTER — OFFICE VISIT (OUTPATIENT)
Dept: INTERNAL MEDICINE CLINIC | Age: 48
End: 2018-03-23

## 2018-03-23 VITALS
RESPIRATION RATE: 14 BRPM | TEMPERATURE: 99.5 F | DIASTOLIC BLOOD PRESSURE: 80 MMHG | OXYGEN SATURATION: 100 % | SYSTOLIC BLOOD PRESSURE: 112 MMHG | HEART RATE: 66 BPM | HEIGHT: 66 IN | WEIGHT: 200 LBS | BODY MASS INDEX: 32.14 KG/M2

## 2018-03-23 DIAGNOSIS — E04.1 THYROID NODULE: ICD-10-CM

## 2018-03-23 DIAGNOSIS — M19.90 OSTEOARTHRITIS, UNSPECIFIED OSTEOARTHRITIS TYPE, UNSPECIFIED SITE: ICD-10-CM

## 2018-03-23 DIAGNOSIS — K21.9 GERD WITHOUT ESOPHAGITIS: ICD-10-CM

## 2018-03-23 DIAGNOSIS — M85.80 OSTEOPENIA, UNSPECIFIED LOCATION: ICD-10-CM

## 2018-03-23 DIAGNOSIS — R41.3 MEMORY LOSS: ICD-10-CM

## 2018-03-23 DIAGNOSIS — M25.50 ARTHRALGIA, UNSPECIFIED JOINT: ICD-10-CM

## 2018-03-23 DIAGNOSIS — G43.009 MIGRAINE WITHOUT AURA AND WITHOUT STATUS MIGRAINOSUS, NOT INTRACTABLE: ICD-10-CM

## 2018-03-23 DIAGNOSIS — Z00.00 PHYSICAL EXAM: Primary | ICD-10-CM

## 2018-03-23 DIAGNOSIS — E66.9 OBESITY (BMI 30-39.9): ICD-10-CM

## 2018-03-23 RX ORDER — ESCITALOPRAM OXALATE 20 MG/1
20 TABLET ORAL DAILY
COMMUNITY
End: 2020-12-15

## 2018-03-23 RX ORDER — DEXLANSOPRAZOLE 60 MG/1
60 CAPSULE, DELAYED RELEASE ORAL DAILY
Qty: 90 CAP | Refills: 3 | Status: SHIPPED | OUTPATIENT
Start: 2018-03-23 | End: 2019-12-16 | Stop reason: SDUPTHER

## 2018-03-23 NOTE — MR AVS SNAPSHOT
Timothy Maynard 
 
 
 [address] [facility], [address] 
[phone] Patient: Sandra Dawkins MRN: [id number] :1970 Visit Information Date & Time Provider Department Dept. Phone Encounter #  
 3/23/2018  2:00 PM Rocky Byrne MD [facility] [phone] [id number] Upcoming Health Maintenance Date Due DTaP/Tdap/Td series (1 - Tdap) 10/8/1989 PAP AKA CERVICAL CYTOLOGY 3/17/2017 Influenza Age 5 to Adult 2017 COLONOSCOPY 2026 Allergies as of 3/23/2018  Review Complete On: 3/23/2018 By: Malka General Severity Noted Reaction Type Reactions Lexapro [Escitalopram]  2012    Other (comments) Sexual prob Current Immunizations  Reviewed on 3/25/2013 Name Date Hepatitis B Vaccine 1999 Influenza Vaccine 2013 Influenza Vaccine PF 10/7/2014 Influenza Vaccine Whole 2009 Not reviewed this visit Vitals BP Pulse Temp Resp Height(growth percentile) Weight(growth percentile) 112/80 (BP 1 Location: Left arm, BP Patient Position: Sitting) 66 99.5 °F (37.5 °C) (Oral) 14 5' 6\" (1.676 m) 200 lb (90.7 kg) SpO2 BMI OB Status Smoking Status 100% 32.28 kg/m2 Menopause Former Smoker Vitals History BMI and BSA Data Body Mass Index Body Surface Area  
 32.28 kg/m 2 2.06 m 2 Preferred Pharmacy Pharmacy Name Phone [address], [address] [place] [address] 14[address] [facility] [phone] Your Updated Medication List  
  
   
This list is accurate as of 3/23/18  3:09 PM.  Always use your most recent med list.  
  
  
  
  
 acetaminophen 325 mg tablet Commonly known as:  TYLENOL Take  by mouth every four (4) hours as needed for Pain. ARMOUR THYROID 60 mg tablet Generic drug:  thyroid (Pork) Take 60 mg by mouth daily.   
  
 CALCIUM PO  
 Take 500 mg by mouth three (3) times daily. Cetirizine 10 mg Cap Take  by mouth. COLACE 100 mg capsule Generic drug:  docusate sodium Take 1,600 mg by mouth two (2) times a day. 6 in the am and 10 at night DEXILANT 60 mg Cpdb Generic drug:  Dexlansoprazole TAKE 1 CAPSULE BY MOUTH DAILY  
  
 escitalopram oxalate 20 mg tablet Commonly known as:  Isaac Angela Take 20 mg by mouth daily. FLONASE 50 mcg/actuation nasal spray Generic drug:  fluticasone 2 Sprays by Both Nostrils route daily. furosemide 20 mg tablet Commonly known as:  LASIX Take once daily as needed for edema  
  
 ibuprofen 200 mg tablet Commonly known as:  MOTRIN Take  by mouth.  
  
 melatonin 3 mg tablet Take  by mouth. MINIVELLE 0.0375 mg/24 hr  
Generic drug:  estradiol 1 Patch by TransDERmal route every Monday and Friday. ONE-A-DAY ESSENTIAL PO Take  by mouth.  
  
 potassium chloride 20 mEq tablet Commonly known as:  K-DUR, KLOR-CON Take once daily as needed w furosemide PRILOSEC OTC PO Take  by mouth. PROGESTERONE  
400 mg by Does Not Apply route. In the pm  
  
 propranolol  mg capsule Commonly known as:  INDERAL LA  
TAKE 1 CAPSULE BY MOUTH DAILY  
  
 rizatriptan 10 mg tablet Commonly known as:  Yareli Ch TAKE 1 TABLET BY MOUTH 1 TIME AS NEEDED FOR MIGRAINE. MAY REPEAT IN 2 HOURS AS NEEDED  
  
 VITAMIN D 2,000 unit Cap capsule Generic drug:  Cholecalciferol (Vitamin D3) Take 1,000 Units by mouth. Introducing [facility]! Rossana Leone introduces [employer] patient portal. Now you can access parts of your medical record, email your doctor's office, and request medication refills online. 1. In your internet browser, go to [email or web address] 2. Click on the First Time User? Click Here link in the Sign In box. You will see the New Member Sign Up page. 3. Enter your [facility] Access Code exactly as it appears below. You will not need to use this code after youve completed the sign-up process. If you do not sign up before the expiration date, you must request a new code. · [facility] Access Code: [email or web address] Expires: 6/21/2018  2:17 PM 
 
4. Enter the last four digits of your Social Security Number (xxxx) and Date of Birth (mm/dd/yyyy) as indicated and click Submit. You will be taken to the next sign-up page. 5. Create a [employer] ID. This will be your [facility] login ID and cannot be changed, so think of one that is secure and easy to remember. 6. Create a [facility] password. You can change your password at any time. 7. Enter your Password Reset Question and Answer. This can be used at a later time if you forget your password. 8. Enter your e-mail address. You will receive e-mail notification when new information is available in [address] 9. Click Sign Up. You can now view and download portions of your medical record. 10. Click the Download Summary menu link to download a portable copy of your medical information. If you have questions, please visit the Frequently Asked Questions section of the [facility] website. Remember, [facility] is NOT to be used for urgent needs. For medical emergencies, dial 911. Now available from your iPhone and Android! Please provide this summary of care documentation to your next provider. Your primary care clinician is listed as Sima Mart. If you have any questions after today's visit, please call [phone].

## 2018-03-23 NOTE — PROGRESS NOTES
1. Have you been to the ER, urgent care clinic or hospitalized since your last visit? NO.     2. Have you seen or consulted any other health care providers outside of the [address] [place] [address] since your last visit (Include any pap smears or colon screening)? NO      Do you have an Advanced Directive? NO    Would you like information on Advanced Directives?  NO

## 2018-03-26 ENCOUNTER — DOCUMENTATION ONLY (OUTPATIENT)
Dept: INTERNAL MEDICINE CLINIC | Age: 48
End: 2018-03-26

## 2018-03-28 ENCOUNTER — TELEPHONE (OUTPATIENT)
Dept: INTERNAL MEDICINE CLINIC | Age: 48
End: 2018-03-28

## 2018-03-28 NOTE — TELEPHONE ENCOUNTER
Ham from Dr Renee Alfredo office called-  She already is followed by  Dr Yoandy Obregon in that group but  She refuses to see her- they need to know what specific Dr you want her to see  Since they need to get permission from Dr Gladys Zavala

## 2018-04-03 ENCOUNTER — DOCUMENTATION ONLY (OUTPATIENT)
Dept: INTERNAL MEDICINE CLINIC | Age: 48
End: 2018-04-03

## 2018-04-18 DIAGNOSIS — G43.009 MIGRAINE WITHOUT AURA AND WITHOUT STATUS MIGRAINOSUS, NOT INTRACTABLE: ICD-10-CM

## 2018-04-18 RX ORDER — RIZATRIPTAN BENZOATE 10 MG/1
TABLET ORAL
Qty: 12 TAB | Refills: 0 | Status: SHIPPED | OUTPATIENT
Start: 2018-04-18 | End: 2018-06-01 | Stop reason: SDUPTHER

## 2018-05-04 LAB
ANTI-DNA (DS) AB QN, 1189: 2 IU/ML
C-REACTIVE PROTEIN, QT, 006627: 0.2 MG/DL (ref 0–0.5)
CCP ANTIBODY IGG,99138601: <0.5 U/ML
CENTROMERE B ANTIBODY, 601143: <0.2 AI
CHROMATIN ANTIBODY: <0.2 AI
ENA SS-A AB SER-ACNC: <0.2 AI
ENA SS-B AB SER-ACNC: <0.2 AI
FOLATE,FOL: 14.39 NG/ML
JO1 ANTIBODY, 8107: <0.2 AI
RHEUMATOID FACTOR QUANT, IMMUNOTURBIDIMETRIC: <20 IU/ML (ref 0–20)
RNP ABS, 016354: <0.2 AI
SCLERODERMA AB (SCL-70), 601116: <0.2 AI
SED RATE (ESR): 2 MM/HR (ref 0–20)
SMITH ABS, 016362: <0.2 AI
SYPHILIS (T. PALLIDUM) IGG, 15809: NON REACTIVE
VIT B12 SERPL-MCNC: 425 PG/ML (ref 211–911)

## 2018-05-08 ENCOUNTER — TELEPHONE (OUTPATIENT)
Dept: INTERNAL MEDICINE CLINIC | Age: 48
End: 2018-05-08

## 2018-05-08 NOTE — TELEPHONE ENCOUNTER
[facility] [place] call    Results for orders placed or performed in visit on 05/04/18   SED RATE (ESR)   Result Value Ref Range    Sed rate (ESR) 2 0 - 20 mm/Hr   [place] COMPREHENSIVE PANEL   Result Value Ref Range    Sjogren's Anti-SS-A <0.2 <1.0 AI    Sjogren's Anti-SS-B <0.2 <1.0 AI    JO1 ANTIBODY <0.2 <1.0 AI    RNP Abs <0.2 <1.0 AI    Scleroderma Ab (SCL-70) <0.2 <1.0 AI    CENTROMERE B ANTIBODY <0.2 <1.0 AI    Umaña Abs <0.2 <1.0 AI    CHROMATIN ANTIBODY <0.2 <1.0 AI    ANTI-DNA (DS) AB QN 2 <=4 IU/ml   CYCLIC CITRUL PEPTIDE AB, IGG   Result Value Ref Range    CCP Ab, IgG <0.5 <=2.9 U/mL   T PALLIDUM SCREEN W/REFLEX   Result Value Ref Range    SYPHILIS (T. PALLIDUM) IGG Non Reactive Non Reacti   C REACTIVE PROTEIN, QT   Result Value Ref Range    C-Reactive Protein, Qt 0.2 0.0 - 0.5 mg/dL   RHEUMATOID FACTOR, QL   Result Value Ref Range    RHEUMATOID FACTOR QUANT, IMMUNOTURBIDIMETRIC <20 0 - 20 IU/mL   VITAMIN B12 & FOLATE   Result Value Ref Range    Vitamin B12 425 211 - 911 pg/mL    Folate 14.39 >=3.10 ng/mL     All tests neg/normal

## 2018-07-09 ENCOUNTER — OFFICE VISIT (OUTPATIENT)
Dept: INTERNAL MEDICINE CLINIC | Age: 48
End: 2018-07-09

## 2018-07-09 VITALS
TEMPERATURE: 99.7 F | SYSTOLIC BLOOD PRESSURE: 112 MMHG | OXYGEN SATURATION: 97 % | RESPIRATION RATE: 16 BRPM | DIASTOLIC BLOOD PRESSURE: 76 MMHG | HEART RATE: 81 BPM

## 2018-07-09 DIAGNOSIS — M54.50 ACUTE LEFT-SIDED LOW BACK PAIN WITHOUT SCIATICA: Primary | ICD-10-CM

## 2018-07-09 RX ORDER — CARISOPRODOL 350 MG/1
350 TABLET ORAL
Qty: 30 TAB | Refills: 0 | Status: SHIPPED | OUTPATIENT
Start: 2018-07-09 | End: 2020-12-15

## 2018-07-09 RX ORDER — METHYLPREDNISOLONE 4 MG/1
4 TABLET ORAL
Qty: 5 TAB | Refills: 0 | Status: SHIPPED | OUTPATIENT
Start: 2018-07-09 | End: 2018-12-17 | Stop reason: ALTCHOICE

## 2018-07-09 NOTE — PROGRESS NOTES
1. Have you been to the ER, urgent care clinic or hospitalized since your last visit? YES. June 22, 2018 in [place]    2. Have you seen or consulted any other health care providers outside of the [address] [place] [address] since your last visit (Include any pap smears or colon screening)? NO      Do you have an Advanced Directive? NO    Would you like information on Advanced Directives? NO      Chief Complaint   Patient presents with    Back Pain     left lower back pain. started last month.  some days are good and some days are bad
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
## 2018-07-09 NOTE — MR AVS SNAPSHOT
[address] 
[phone] Patient: Braulio Silva MRN: [id number] :1970 Visit Information Date & Time Provider Department Dept. Phone Encounter #  
 2018  3:30 PM Corry Paez MD [facility] [phone] Upcoming Health Maintenance Date Due DTaP/Tdap/Td series (1 - Tdap) 10/8/1989 PAP AKA CERVICAL CYTOLOGY 3/17/2017 Influenza Age 5 to Adult 2018 COLONOSCOPY 2026 Allergies as of 2018  Review Complete On: 2018 By: Fabian Hernandez LPN Severity Noted Reaction Type Reactions Lexapro [Escitalopram]  2012    Other (comments) Sexual prob Current Immunizations  Reviewed on 3/23/2018 Name Date Hepatitis B Vaccine 1999 Influenza Vaccine 10/1/2017, 10/1/2016, 10/1/2015, 2013 Influenza Vaccine PF 10/7/2014 Influenza Vaccine Whole 2009 Not reviewed this visit Vitals BP Pulse Temp Resp SpO2 OB Status 112/76 81 99.7 °F (37.6 °C) (Oral) 16 97% Menopause Smoking Status Former Smoker Vitals History Preferred Pharmacy Pharmacy Name Phone [address], [address] [place] [address] [phone] [facility] [phone] Your Updated Medication List  
  
   
This list is accurate as of 18  4:14 PM.  Always use your most recent med list.  
  
  
  
  
 acetaminophen 325 mg tablet Commonly known as:  TYLENOL Take  by mouth every four (4) hours as needed for Pain. ARMOUR THYROID 60 mg tablet Generic drug:  thyroid (Pork) Take 60 mg by mouth daily. CALCIUM PO Take 500 mg by mouth three (3) times daily. Cetirizine 10 mg Cap Take  by mouth. COLACE 100 mg capsule Generic drug:  docusate sodium Take 1,600 mg by mouth two (2) times a day. 6 in the am and 10 at night Dexlansoprazole 60 mg Cpdb Commonly known as:  DEXILANT Take 1 Cap by mouth daily. escitalopram oxalate 20 mg tablet Commonly known as:  Nela Gale Take 20 mg by mouth daily. FLONASE 50 mcg/actuation nasal spray Generic drug:  fluticasone 2 Sprays by Both Nostrils route daily. furosemide 20 mg tablet Commonly known as:  LASIX Take once daily as needed for edema  
  
 ibuprofen 200 mg tablet Commonly known as:  MOTRIN Take  by mouth.  
  
 melatonin 3 mg tablet Take  by mouth. MINIVELLE 0.0375 mg/24 hr  
Generic drug:  estradiol 1 Patch by TransDERmal route every Monday and Friday. ONE-A-DAY ESSENTIAL PO Take  by mouth.  
  
 potassium chloride 20 mEq tablet Commonly known as:  K-DUR, KLOR-CON Take once daily as needed w furosemide PROGESTERONE  
400 mg by Does Not Apply route. In the pm  
  
 propranolol  mg capsule Commonly known as:  INDERAL LA  
TAKE 1 CAPSULE BY MOUTH DAILY  
  
 rizatriptan 10 mg tablet Commonly known as:  Marlaine Lipps TAKE 1 TABLET BY MOUTH 1 TIME AS NEEDED FOR MIGRAINE. MAY REPEAT IN 2 HOURS AS NEEDED  
  
 VITAMIN D 2,000 unit Cap capsule Generic drug:  Cholecalciferol (Vitamin D3) Take 1,000 Units by mouth. Introducing [facility]! [facility] introduces [employer] patient portal. Now you can access parts of your medical record, email your doctor's office, and request medication refills online. 1. In your internet browser, go to [email or web address] 2. Click on the First Time User? Click Here link in the Sign In box. You will see the New Member Sign Up page. 3. Enter your [employer] Access Code exactly as it appears below. You will not need to use this code after youve completed the sign-up process. If you do not sign up before the expiration date, you must request a new code. · [employer] Access Code: [email or web address] Expires: 10/7/2018  4:14 PM 
 
 4. Enter the last four digits of your Social Security Number (xxxx) and Date of Birth (mm/dd/yyyy) as indicated and click Submit. You will be taken to the next sign-up page. 5. Create a [employer] ID. This will be your [employer] login ID and cannot be changed, so think of one that is secure and easy to remember. 6. Create a [employer] password. You can change your password at any time. 7. Enter your Password Reset Question and Answer. This can be used at a later time if you forget your password. 8. Enter your e-mail address. You will receive e-mail notification when new information is available in [address] 9. Click Sign Up. You can now view and download portions of your medical record. 10. Click the Download Summary menu link to download a portable copy of your medical information. If you have questions, please visit the Frequently Asked Questions section of the [employer] website. Remember, [employer] is NOT to be used for urgent needs. For medical emergencies, dial 911. Now available from your iPhone and Android! Please provide this summary of care documentation to your next provider. Your primary care clinician is listed as Saroj Delgado. If you have any questions after today's visit, please call [phone].

## 2018-08-13 DIAGNOSIS — G43.009 MIGRAINE WITHOUT AURA AND WITHOUT STATUS MIGRAINOSUS, NOT INTRACTABLE: ICD-10-CM

## 2018-08-14 RX ORDER — PROPRANOLOL HYDROCHLORIDE 160 MG/1
CAPSULE, EXTENDED RELEASE ORAL
Qty: 30 CAP | Refills: 0 | Status: SHIPPED | OUTPATIENT
Start: 2018-08-14 | End: 2018-09-27 | Stop reason: SDUPTHER

## 2018-09-27 DIAGNOSIS — G43.009 MIGRAINE WITHOUT AURA AND WITHOUT STATUS MIGRAINOSUS, NOT INTRACTABLE: ICD-10-CM

## 2018-10-01 RX ORDER — PROPRANOLOL HYDROCHLORIDE 160 MG/1
CAPSULE, EXTENDED RELEASE ORAL
Qty: 90 CAP | Refills: 0 | Status: SHIPPED | OUTPATIENT
Start: 2018-10-01 | End: 2018-12-29 | Stop reason: SDUPTHER

## 2018-12-17 ENCOUNTER — OFFICE VISIT (OUTPATIENT)
Dept: ORTHOPEDIC SURGERY | Age: 48
End: 2018-12-17

## 2018-12-17 VITALS
TEMPERATURE: 98.3 F | DIASTOLIC BLOOD PRESSURE: 77 MMHG | HEIGHT: 66 IN | OXYGEN SATURATION: 99 % | WEIGHT: 207 LBS | RESPIRATION RATE: 18 BRPM | BODY MASS INDEX: 33.27 KG/M2 | SYSTOLIC BLOOD PRESSURE: 130 MMHG | HEART RATE: 60 BPM

## 2018-12-17 DIAGNOSIS — M54.2 CERVICAL PAIN: Primary | ICD-10-CM

## 2018-12-17 DIAGNOSIS — K21.9 GASTROESOPHAGEAL REFLUX DISEASE, ESOPHAGITIS PRESENCE NOT SPECIFIED: ICD-10-CM

## 2018-12-17 DIAGNOSIS — M47.812 CERVICAL FACET JOINT SYNDROME: ICD-10-CM

## 2018-12-17 DIAGNOSIS — M62.838 MUSCLE SPASM: ICD-10-CM

## 2018-12-17 DIAGNOSIS — M79.18 MYOFASCIAL PAIN: ICD-10-CM

## 2018-12-17 RX ORDER — SUCRALFATE 1 G/1
1 TABLET ORAL 4 TIMES DAILY
Qty: 60 TAB | Refills: 2 | Status: SHIPPED | OUTPATIENT
Start: 2018-12-17 | End: 2019-03-18 | Stop reason: SDUPTHER

## 2018-12-17 RX ORDER — PREDNISONE 10 MG/1
TABLET ORAL
Qty: 11 TAB | Refills: 0 | Status: SHIPPED | OUTPATIENT
Start: 2018-12-17 | End: 2019-05-03

## 2018-12-17 NOTE — PROGRESS NOTES
As per Dr. Dereck Gipson request, theracane demonstration, education, and handout provided to patient.

## 2018-12-17 NOTE — PATIENT INSTRUCTIONS
Neck Arthritis: Exercises  Your Care Instructions  Here are some examples of typical rehabilitation exercises for your condition. Start each exercise slowly. Ease off the exercise if you start to have pain. Your doctor or physical therapist will tell you when you can start these exercises and which ones will work best for you. How to do the exercises  Neck stretches to the side    1. This stretch works best if you keep your shoulder down as you lean away from it. To help you remember to do this, start by relaxing your shoulders and lightly holding on to your thighs or your chair. 2. Tilt your head toward your shoulder and hold for 15 to 30 seconds. Let the weight of your head stretch your muscles. 3. Repeat 2 to 4 times toward each shoulder. Chin tuck    1. Lie on the floor with a rolled-up towel under your neck. Your head should be touching the floor. 2. Slowly bring your chin toward your chest.  3. Hold for a count of 6, and then relax for up to 10 seconds. 4. Repeat 8 to 12 times. Active cervical rotation    1. Sit in a firm chair, or stand up straight. 2. Keeping your chin level, turn your head to the right, and hold for 15 to 30 seconds. 3. Turn your head to the left and hold for 15 to 30 seconds. 4. Repeat 2 to 4 times to each side. Shoulder blade squeeze    1. While standing, squeeze your shoulder blades together. 2. Do not raise your shoulders up as you are squeezing. 3. Hold for 6 seconds. 4. Repeat 8 to 12 times. Shoulder rolls    1. Sit comfortably with your feet shoulder-width apart. You can also do this exercise standing up. 2. Roll your shoulders up, then back, and then down in a smooth, circular motion. 3. Repeat 2 to 4 times. Follow-up care is a key part of your treatment and safety. Be sure to make and go to all appointments, and call your doctor if you are having problems.  It's also a good idea to know your test results and keep a list of the medicines you take.  Where can you learn more? Go to http://altagracia-raysa.info/. Enter P999 in the search box to learn more about \"Neck Arthritis: Exercises. \"  Current as of: November 29, 2017  Content Version: 11.8  © 2699-9821 Healthwise, "[employer]. Care instructions adapted under license by [employer] (which disclaims liability or warranty for this information). If you have questions about a medical condition or this instruction, always ask your healthcare professional. [address] any warranty or liability for your use of this information.

## 2018-12-17 NOTE — PROGRESS NOTES
[facility]  [address] [place], [address]  Phone: [phone]  Fax: [phone]    NEW PATIENT  Pt's YOB: 1970    ASSESSMENT   Diagnoses and all orders for this visit:    1. Cervical pain  -     AMB POC XRAY, SPINE, CERVICAL; 2 OR 3  -     predniSONE (DELTASONE) 10 mg tablet; 2 pills in am for 3 days, then 1 pill in am for 3 days and 1/2 pill in am for remainder  -     REFERRAL TO PHYSICAL THERAPY    2. Muscle spasm  -     REFERRAL TO PHYSICAL THERAPY    3. Myofascial pain  -     REFERRAL TO PHYSICAL THERAPY    4. Cervical facet joint syndrome  -     predniSONE (DELTASONE) 10 mg tablet; 2 pills in am for 3 days, then 1 pill in am for 3 days and 1/2 pill in am for remainder  -     REFERRAL TO PHYSICAL THERAPY    5. Gastroesophageal reflux disease, esophagitis presence not specified  -     sucralfate (CARAFATE) 1 gram tablet; Take 1 Tab by mouth four (4) times daily. IMPRESSION AND PLAN:  Rhonda Jaramillo is a 46 y.o. female with history of neck pain x 1.5 months. She complains of neck pain without radicular symptoms. Pt denies any weakness, tingling, or burning pain in the arms/hands at this time. Pt has not recently tried physical therapy and denies ever trying dry needling. She recently tried a 5 day course of prednisone. 1) Pt was given information on cervical arthritis exercises. 2) Discussed treatment options with the Pt, including medication, physical therapy with dry needling, and cervical steroid injections. 3) She was given information on how to use a TheraCane. 4) Pt was referred to cervical physical therapy with evaluation for dry needling. 5) Discussed ordering a cervical MRI if needed. 6) She was prescribed a prednisone taper. 7) Pt was also prescribed Carafate 1 gram tablet 4 x daily. 8) Ms. Madison Newell has a reminder for a \"due or due soon\" health maintenance.  I have asked that she contact her primary care provider, Adrienne Muller MD, for follow-up on this health maintenance. 9)  demonstrated consistency with prescribing. 10) Pt will follow-up in 1 month or sooner if needed. HISTORY OF PRESENT ILLNESS:  Evans Gauthier is a 46 y.o. female with history of neck pain x 1.5 months. Pt presents to the office today as a self referred new patient. She complains of neck pain without radicular symptoms. Pt denies any weakness, tingling, or burning pain in the arms/hands. She also denies any issues with balance, change in bowel/bladder control, or dropping objects. Pt notes that prior to her most recent episode of pain, she was followed by Dr. Rico Sneed some time ago for neck pain. She notes pain in the lateral aspect of both hips. Pt denies any pain radiating down the legs at this time. She states that she likes to [employer] which requires frequent heavy lifting. Pt has a history of migraine headaches and notes that she receives Botox treatments. She notes the onset of neck pain about 1 week after her Botox injections. Pt has not recently tried physical therapy and denies ever trying dry needling. She notes that she tried a 5 day course of prednisone 1.5 months ago. Pt notes double vision when taking Flexeril in the past. She reports improvement when she tried a left knee injection and has considered trying cervical injections. Pt at this time desires to proceed with medication evaluation and physical therapy. She is a former smoker. Pain Scale: 6/10     PCP: Adrienne Muller MD    Past Medical History:   Diagnosis Date    Alcohol abuse     X2-3 yrs    Bulimia     X20 years Dr. Bony Dyer;   Bhat-Bautista 2011(?)    Constipation     Degenerative arthritis of knee     knees Dr. Elie Carranza Degenerative disc disease, cervical 2009    C5-6 Dr Frye Lever disorder     [address] Gastritis 12/12    GERD (gastroesophageal reflux disease) 2009    Michelle Flores, last EGD     H/O bone graft 2017    Dental bone graft for dental implant    Headache(784.0)     Migraines Dr. Kareem Farias, saw Dr. Dalia López also; now seeing Dr Fernando Roberson for botox    Hydronephrosis of right kidney     Dr Hirsch Gwen Hypothyroidism     Dr Chun Caballero (??)    Lumbar degenerative disc disease 2014    L4-5 mild degen disc w mild central stenosis    Obesity (BMI 30-39.9) 3/23/2018    Osteopenia  FRAX 3.0 and 0.3      DEXA t score 0.4 spine, -1.6 hip (); -0.3 spine, -1.5 hip w FRAX 3.4/0.3 (4/15); w/u neg for secondary causes    PPD positive, treated     age 6    Sleep apnea ? on cpap although she's not using Dr. Lorena Gordillo Thyroid nodule     Dr Jennifer Turner; left 5mm ; no change , ,     Venous insufficiency 2017        Social History     Socioeconomic History    Marital status:      Spouse name: Not on file    Number of children: 2    Years of education: Not on file    Highest education level: Not on file   Social Needs    Financial resource strain: Not on file    Food insecurity - worry: Not on file    Food insecurity - inability: Not on file   [employer] needs - medical: Not on file   [employer] needs - non-medical: Not on file   Occupational History    Occupation: RN currently housewife     Employer: not employed   Tobacco Use    Smoking status: Former Smoker     Last attempt to quit: 1987     Years since quittin.9    Smokeless tobacco: Never Used   Substance and Sexual Activity    Alcohol use:  Yes     Alcohol/week: 0.0 oz     Comment: abuse-has not drank in four years    Drug use: No     Comment: Former use a long time ago    Sexual activity: Not on file   Other Topics Concern    Not on file   Social History Narrative    Not on file       Current Outpatient Medications   Medication Sig Dispense Refill    predniSONE (DELTASONE) 10 mg tablet 2 pills in am for 3 days, then 1 pill in am for 3 days and 1/2 pill in am for remainder 11 Tab 0    sucralfate (CARAFATE) 1 gram tablet Take 1 Tab by mouth four (4) times daily. 60 Tab 2    propranolol LA (INDERAL LA) 160 mg capsule TAKE 1 CAPSULE BY MOUTH DAILY 90 Cap 0    rizatriptan (MAXALT) 10 mg tablet TAKE 1 TABLET BY MOUTH 1 TIME AS NEEDED FOR MIGRAINE. MAY REPEAT IN 2 HOURS AS NEEDED 12 Tab 5    escitalopram oxalate (LEXAPRO) 20 mg tablet Take 20 mg by mouth daily.  Dexlansoprazole (DEXILANT) 60 mg CpDB Take 1 Cap by mouth daily. 90 Cap 3    furosemide (LASIX) 20 mg tablet Take once daily as needed for edema 30 Tab 6    potassium chloride (K-DUR, KLOR-CON) 20 mEq tablet Take once daily as needed w furosemide 30 Tab 6    fluticasone (FLONASE) 50 mcg/actuation nasal spray 2 Sprays by Both Nostrils route daily.  PROGESTERONE 400 mg by Does Not Apply route. In the pm      Cetirizine 10 mg cap Take  by mouth.  acetaminophen (TYLENOL) 325 mg tablet Take  by mouth every four (4) hours as needed for Pain.  ibuprofen (MOTRIN) 200 mg tablet Take  by mouth.  estradiol (MINIVELLE) 0.0375 mg/24 hr 1 Patch by TransDERmal route every Monday and Friday.  melatonin 3 mg tablet Take  by mouth.  thyroid, Pork, (ARMOUR THYROID) 60 mg tablet Take 60 mg by mouth daily.  docusate sodium (COLACE) 100 mg capsule Take 1,600 mg by mouth two (2) times a day. 6 in the am and 10 at night      CALCIUM PO Take 500 mg by mouth three (3) times daily.  Cholecalciferol, Vitamin D3, (VITAMIN D) 2,000 unit Cap Take 1,000 Units by mouth.  carisoprodol (SOMA) 350 mg tablet Take 1 Tab by mouth every eight (8) hours as needed for Muscle Spasm(s). Max Daily Amount: 1,050 mg. (Patient not taking: Reported on 12/17/2018) 30 Tab 0    MULTIVITAMIN (ONE-A-DAY ESSENTIAL PO) Take  by mouth. Allergies   Allergen Reactions    Lexapro [Escitalopram] Other (comments)     Sexual prob       REVIEW OF SYSTEMS    Constitutional: Negative for fever, chills, or weight change.    Respiratory: Negative for cough or shortness of breath. Cardiovascular: Negative for chest pain or palpitations. Gastrointestinal: Positive for acid reflux; negative change in bowel habits, gi bleeding or constipation. Genitourinary: Negative for dysuria and flank pain. Musculoskeletal: Positive for cervical pain. Skin: Negative for rash. Neurological: Positive for headaches; negative for dizziness, or numbness. Endo/Heme/Allergies: Negative for increased bruising. Psychiatric/Behavioral: Positive for difficulty with sleep. PHYSICAL EXAMINATION  Visit Vitals  /77   Pulse 60   Temp 98.3 °F (36.8 °C)   Resp 18   Ht 5' 6\" (1.676 m)   Wt 207 lb (93.9 kg)   SpO2 99%   BMI 33.41 kg/m²       Constitutional: Awake, alert, and in no acute distress. HEENT: Normocephalic. Atraumatic. Oropharynx is moist and clear. PERRL. EOMI. Sclerae are nonicteric  Heart: Regular rate and rhythm  Lungs: Clear to auscultation bilaterally  Abdomen: Soft and nontender. Bowel sounds are present  Neurological: 1+ symmetrical DTRs in the upper extremities. 1+ symmetrical DTRs in the lower extremities. Sensation to light touch is intact. Negative Ilan's sign bilaterally. Skin: warm, dry, and intact. Musculoskeletal: Tight across the upper trapezii with scatted trigger points. Decreased range of motion with side to side cervical flexion. Good range of motion of both shoulders. Minimal tenderness to palpation in the lower lumbar region. No pain with extension, axial loading, or forward flexion. No tenderness with palpation over the greater trochanters. No pain with internal or external rotation of her hips. Negative straight leg raise bilaterally.        Biceps  Triceps Deltoids Wrist Ext Wrist Flex Hand Intrin   Right +4/5 +4/5 +4/5 +4/5 +4/5 +4/5   Left +4/5 +4/5 +4/5 +4/5 +4/5 +4/5      Hip Flex  Quads Hamstrings Ankle DF EHL Ankle PF   Right +4/5 +4/5 +4/5 +4/5 +4/5 +4/5   Left +4/5 +4/5 +4/5 +4/5 +4/5 +4/5 IMAGING:    Cervical spine 2V x-rays from 12/17/2018 were personally reviewed with the patient and demonstrated:  Straightening of the cervical spine. Mild degenerative facets in the lower cervical region. Cervical MRI from 09/26/2013 was reviewed and demonstrated:    9/26/2013 19:33) Provider Status: Open   Scans on Order [id number]     Radiology/ImagingRadiology/Imaging   Study Result     MRI of cervical spine without contrast     CPT CODE: 85489     HISTORY: Pain, radiculopathy     COMPARISON: None     FINDINGS:     The visualized posterior fossa contents look normal. The prevertebral soft  tissues look normal.     The cervical cord is normal in signal and caliber.     There is mild reversal of the normal cervical lordosis. No significant  listhesis. Vertebral body heights are maintained. No pathologic signal  abnormality within the bone marrow. There is mild edema in the posterior soft  tissues in the region of the upper cervical spine, reference sagittal inversion  recovery image  7.     Disc heights are preserved.     C1-2: Normal     C2-3: Normal     C3-4: Normal     C4-5: Minimal posterior disc bulge. No significant foraminal or canal stenosis.       C5-6: Mild uncovertebral and facet arthropathy, without significant canal or  foraminal stenosis.     C6-7: Minimal posterior disc bulge. Mild uncovertebral and facet arthropathy. Posterior ligamentous thickening. Canal remains patent. Mild left foraminal  narrowing.     C7-T1: Normal     IMPRESSION:  Mild multilevel degenerative changes, without critical canal stenosis. Mild  left foraminal narrowing at C6-7.     Mild edema in the posterior soft tissues of the upper cervical spine may  represent mild soft tissue injury. No evidence of ligamentous trauma.               Written by Keya Pace, as dictated by Catalino Salmon MD.  I, Dr. Catalino Salmon confirm that all documentation is accurate.

## 2018-12-19 RX ORDER — SUCRALFATE 1 G/1
TABLET ORAL
Qty: 360 TAB | Refills: 2 | OUTPATIENT
Start: 2018-12-19

## 2018-12-20 NOTE — TELEPHONE ENCOUNTER
Returned call to Chris Pantoja at [employer] [facility] [employer]. Informed of below message per Dr. Cindy Garnica and refusal of 90 day supply. Chris Pantoja verbalized agreement/understanding. No further action required at this time.

## 2019-01-16 NOTE — PROGRESS NOTES
Returned call to patient, verified , informed patient of below. Assisted patient with scheduling appointment. No further action required at this time.

## 2019-03-18 ENCOUNTER — OFFICE VISIT (OUTPATIENT)
Dept: ORTHOPEDIC SURGERY | Age: 49
End: 2019-03-18

## 2019-03-18 VITALS
HEIGHT: 65 IN | OXYGEN SATURATION: 98 % | TEMPERATURE: 98.1 F | HEART RATE: 66 BPM | SYSTOLIC BLOOD PRESSURE: 123 MMHG | WEIGHT: 206.8 LBS | RESPIRATION RATE: 14 BRPM | DIASTOLIC BLOOD PRESSURE: 75 MMHG | BODY MASS INDEX: 34.45 KG/M2

## 2019-03-18 DIAGNOSIS — M47.812 CERVICAL FACET JOINT SYNDROME: ICD-10-CM

## 2019-03-18 DIAGNOSIS — M54.2 CERVICAL PAIN: Primary | ICD-10-CM

## 2019-03-18 DIAGNOSIS — K21.9 GASTROESOPHAGEAL REFLUX DISEASE, ESOPHAGITIS PRESENCE NOT SPECIFIED: ICD-10-CM

## 2019-03-18 DIAGNOSIS — M62.838 MUSCLE SPASM: ICD-10-CM

## 2019-03-18 DIAGNOSIS — M79.18 MYOFASCIAL PAIN: ICD-10-CM

## 2019-03-18 RX ORDER — SUCRALFATE 1 G/1
1 TABLET ORAL 4 TIMES DAILY
Qty: 60 TAB | Refills: 2 | Status: SHIPPED | OUTPATIENT
Start: 2019-03-18 | End: 2019-04-16

## 2019-03-18 RX ORDER — PREDNISONE 10 MG/1
TABLET ORAL
Qty: 11 TAB | Refills: 0 | Status: SHIPPED | OUTPATIENT
Start: 2019-03-18 | End: 2019-05-03

## 2019-03-18 NOTE — PATIENT INSTRUCTIONS
Neck Arthritis: Exercises  Your Care Instructions  Here are some examples of typical rehabilitation exercises for your condition. Start each exercise slowly. Ease off the exercise if you start to have pain. Your doctor or physical therapist will tell you when you can start these exercises and which ones will work best for you. How to do the exercises  Neck stretches to the side    1. This stretch works best if you keep your shoulder down as you lean away from it. To help you remember to do this, start by relaxing your shoulders and lightly holding on to your thighs or your chair. 2. Tilt your head toward your shoulder and hold for 15 to 30 seconds. Let the weight of your head stretch your muscles. 3. Repeat 2 to 4 times toward each shoulder. Chin tuck    1. Lie on the floor with a rolled-up towel under your neck. Your head should be touching the floor. 2. Slowly bring your chin toward your chest.  3. Hold for a count of 6, and then relax for up to 10 seconds. 4. Repeat 8 to 12 times. Active cervical rotation    1. Sit in a firm chair, or stand up straight. 2. Keeping your chin level, turn your head to the right, and hold for 15 to 30 seconds. 3. Turn your head to the left and hold for 15 to 30 seconds. 4. Repeat 2 to 4 times to each side. Shoulder blade squeeze    1. While standing, squeeze your shoulder blades together. 2. Do not raise your shoulders up as you are squeezing. 3. Hold for 6 seconds. 4. Repeat 8 to 12 times. Shoulder rolls    1. Sit comfortably with your feet shoulder-width apart. You can also do this exercise standing up. 2. Roll your shoulders up, then back, and then down in a smooth, circular motion. 3. Repeat 2 to 4 times. Follow-up care is a key part of your treatment and safety. Be sure to make and go to all appointments, and call your doctor if you are having problems.  It's also a good idea to know your test results and keep a list of the medicines you take.  Where can you learn more? Go to http://altagracia-raysa.info/. Enter H124 in the search box to learn more about \"Neck Arthritis: Exercises. \"  Current as of: September 20, 2018  Content Version: 11.9  © 7970-1795 [employer]. Care instructions adapted under license by [employer] (which disclaims liability or warranty for this information). If you have questions about a medical condition or this instruction, always ask your healthcare professional. [place] [address] any warranty or liability for your use of this information.

## 2019-03-18 NOTE — PROGRESS NOTES
[facility]  [address] [place], [facility] [address]  Phone: [phone]  Fax: [phone]    Pt's YOB: 1970    ASSESSMENT   Diagnoses and all orders for this visit:    1. Cervical pain  -     predniSONE (DELTASONE) 10 mg tablet; 2 pills in am for 3 days, then 1 pill in am for 3 days and 1/2 pill in am for remainder  -     REFERRAL TO PHYSICAL THERAPY    2. Muscle spasm  -     REFERRAL TO PHYSICAL THERAPY    3. Myofascial pain  -     REFERRAL TO PHYSICAL THERAPY    4. Cervical facet joint syndrome  -     predniSONE (DELTASONE) 10 mg tablet; 2 pills in am for 3 days, then 1 pill in am for 3 days and 1/2 pill in am for remainder  -     REFERRAL TO PHYSICAL THERAPY    5. Gastroesophageal reflux disease, esophagitis presence not specified  -     sucralfate (CARAFATE) 1 gram tablet; Take 1 Tab by mouth four (4) times daily. IMPRESSION AND PLAN:  Shoaib Castillo is a 46 y.o. female with history of cervical pain. 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 She reports improvement in her range of motion with physical therapy and dry needling but notes that she missed her last few sessions due to the flu. Pt reports improvement with prednisone. 1) Pt was given information on cervical arthritis exercises. 2) She will restart physical therapy with dry needling. 3) I recommended the patient regularly stretch and massage her neck/upper back. 4) Pt was prescribed a prednisone taper. 5) She received a refill of Carafate. 6) Ms. Luisa Martinez has a reminder for a \"due or due soon\" health maintenance. I have asked that she contact her primary care provider, Pedrito Gutiérrez MD, for follow-up on this health maintenance. 7)  demonstrated consistency with prescribing. 8) Pt will follow-up in 4 months or sooner if needed.       HISTORY OF PRESENT ILLNESS:  Patricia Mcdermott is a 46 y.o. female with history of cervical pain and presents to the office today for follow up. 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 She reports improvement with physical therapy and dry needling with Sussy Jhonny since her last office visit. Pt admits that she attended sessions regularly but then missed a few appointments when she had the flu. She reports improvement in her range of motion with the dry needling and requests to resume sessions at this time. Pt admits to intermittent left sided headaches. She notes improvement with a prednisone taper since her last office visit and state that she took the prednisone with Carafate. Pt at this time desires to proceed with physical therapy. Pain Scale: /10    PCP: Lisandro Bello MD     Past Medical History:   Diagnosis Date    Alcohol abuse     X2-3 yrs    Bulimia     X20 years Dr. Gilda Gordon; Meg 2011(?)    Constipation     Degenerative arthritis of knee     knees Dr. Isaiah Fregoso Degenerative disc disease, cervical 2009    C5-6 Dr Paige Salinas disorder     [address] Gastritis 12/12    GERD (gastroesophageal reflux disease) 2009    Lori Ann, last EGD 12/12    H/O bone graft 05/22/2017    Dental bone graft for dental implant    Headache(784.0)     Migraines Dr. Cristel Shore, saw Dr. Maria Esther Luo also; now seeing Dr Zoe Schultz for botox    Hydronephrosis of right kidney     Dr Corry Preciado Hypothyroidism     Dr Person Age (??)    Lumbar degenerative disc disease 01/2014    L4-5 mild degen disc w mild central stenosis    Obesity (BMI 30-39.9) 3/23/2018    Osteopenia 2/13 FRAX 3.0 and 0.3      DEXA t score 0.4 spine, -1.6 hip (2/13); -0.3 spine, -1.5 hip w FRAX 3.4/0.3 (4/15); w/u neg for secondary causes    PPD positive, treated     age 6    Sleep apnea 2011?     on cpap although she's not using Dr. Renea Sood Thyroid nodule     Dr Madison Castelan; left 5mm ; no change , ,     Venous insufficiency 2017        Social History     Socioeconomic History    Marital status:      Spouse name: Not on file    Number of children: 2    Years of education: Not on file    Highest education level: Not on file   Social Needs    Financial resource strain: Not on file    Food insecurity - worry: Not on file    Food insecurity - inability: Not on file   [employer] needs - medical: Not on file   [employer] needs - non-medical: Not on file   Occupational History    Occupation: RN currently housewife     Employer: not employed   Tobacco Use    Smoking status: Former Smoker     Last attempt to quit: 1987     Years since quittin.2    Smokeless tobacco: Never Used   Substance and Sexual Activity    Alcohol use: Yes     Alcohol/week: 0.0 oz     Comment: abuse-has not drank in four years    Drug use: No     Comment: Former use a long time ago    Sexual activity: Not on file   Other Topics Concern    Not on file   Social History Narrative    Not on file       Current Outpatient Medications   Medication Sig Dispense Refill    sucralfate (CARAFATE) 1 gram tablet Take 1 Tab by mouth four (4) times daily. 60 Tab 2    predniSONE (DELTASONE) 10 mg tablet 2 pills in am for 3 days, then 1 pill in am for 3 days and 1/2 pill in am for remainder 11 Tab 0    propranolol LA (INDERAL LA) 160 mg capsule TAKE 1 CAPSULE BY MOUTH DAILY 90 Cap 1    rizatriptan (MAXALT) 10 mg tablet TAKE 1 TABLET BY MOUTH 1 TIME AS NEEDED FOR MIGRAINE. MAY REPEAT IN 2 HOURS AS NEEDED 12 Tab 5    MULTIVITAMIN (ONE-A-DAY ESSENTIAL PO) Take  by mouth.  escitalopram oxalate (LEXAPRO) 20 mg tablet Take 20 mg by mouth daily.  Dexlansoprazole (DEXILANT) 60 mg CpDB Take 1 Cap by mouth daily.  90 Cap 3    furosemide (LASIX) 20 mg tablet Take once daily as needed for edema 30 Tab 6  potassium chloride (K-DUR, KLOR-CON) 20 mEq tablet Take once daily as needed w furosemide 30 Tab 6    fluticasone (FLONASE) 50 mcg/actuation nasal spray 2 Sprays by Both Nostrils route daily.  PROGESTERONE 400 mg by Does Not Apply route. In the pm      Cetirizine 10 mg cap Take  by mouth.  acetaminophen (TYLENOL) 325 mg tablet Take  by mouth every four (4) hours as needed for Pain.  ibuprofen (MOTRIN) 200 mg tablet Take  by mouth.  estradiol (MINIVELLE) 0.0375 mg/24 hr 1 Patch by TransDERmal route every Monday and Friday.  melatonin 3 mg tablet Take  by mouth.  thyroid, Pork, (ARMOUR THYROID) 60 mg tablet Take 60 mg by mouth daily.  docusate sodium (COLACE) 100 mg capsule Take 1,600 mg by mouth two (2) times a day. 6 in the am and 10 at night      CALCIUM PO Take 500 mg by mouth three (3) times daily.  Cholecalciferol, Vitamin D3, (VITAMIN D) 2,000 unit Cap Take 1,000 Units by mouth.  predniSONE (DELTASONE) 10 mg tablet 2 pills in am for 3 days, then 1 pill in am for 3 days and 1/2 pill in am for remainder 11 Tab 0    carisoprodol (SOMA) 350 mg tablet Take 1 Tab by mouth every eight (8) hours as needed for Muscle Spasm(s). Max Daily Amount: 1,050 mg. (Patient not taking: Reported on 12/17/2018) 30 Tab 0       Allergies   Allergen Reactions    Lexapro [Escitalopram] Other (comments)     Sexual prob         REVIEW OF SYSTEMS    Constitutional: Negative for fever, chills, or weight change. Respiratory: Negative for cough or shortness of breath. Cardiovascular: Negative for chest pain or palpitations. Gastrointestinal: Negative for acid reflux, change in bowel habits, or constipation. Genitourinary: Negative for dysuria and flank pain. Musculoskeletal: Positive for cervical pain. Skin: Negative for rash. Neurological: Positive for headaches Negative for dizziness or numbness. Endo/Heme/Allergies: Negative for increased bruising. Psychiatric/Behavioral: Negative for difficulty with sleep. PHYSICAL EXAMINATION  Visit Vitals  /75   Pulse 66   Temp 98.1 °F (36.7 °C) (Oral)   Resp 14   Ht 5' 5\" (1.651 m)   Wt 206 lb 12.8 oz (93.8 kg)   SpO2 98%   BMI 34.41 kg/m²       Constitutional: Awake, alert, and in no acute distress. Neurological: 1+ symmetrical DTRs in the upper extremities. Sensation to light touch is intact. Negative Ilan's sign bilaterally. Skin: warm, dry, and intact. Musculoskeletal: Improved range of motion with side to side cervical flexion. Tight across the upper trapezius. No pain with extension, axial loading, or forward flexion. No pain with internal or external rotation of her hips. Negative straight leg raise bilaterally. Biceps  Triceps Deltoids Wrist Ext Wrist Flex Hand Intrin   Right +4/5 +4/5 +4/5 +4/5 +4/5 +4/5   Left +4/5 +4/5 +4/5 +4/5 +4/5 +4/5     IMAGING:    Cervical spine 2V x-rays from 12/17/2018 were personally reviewed with the patient and demonstrated:  Straightening of the cervical spine. Mild degenerative facets in the lower cervical region.      Cervical MRI from 09/26/2013 was reviewed and demonstrated:  MRI of cervical spine without contrast     CPT CODE: 55176     HISTORY: Pain, radiculopathy     COMPARISON: None     FINDINGS:     The visualized posterior fossa contents look normal. The prevertebral soft  tissues look normal.     The cervical cord is normal in signal and caliber.     There is mild reversal of the normal cervical lordosis. No significant  listhesis. Vertebral body heights are maintained. No pathologic signal  abnormality within the bone marrow. There is mild edema in the posterior soft  tissues in the region of the upper cervical spine, reference sagittal inversion  recovery image  7.     Disc heights are preserved.     C1-2: Normal     C2-3: Normal     C3-4: Normal     C4-5: Minimal posterior disc bulge. No significant foraminal or canal stenosis.         C5-6: Mild uncovertebral and facet arthropathy, without significant canal or  foraminal stenosis.     C6-7: Minimal posterior disc bulge. Mild uncovertebral and facet arthropathy. Posterior ligamentous thickening. Canal remains patent. Mild left foraminal  narrowing.     C7-T1: Normal     IMPRESSION:  Mild multilevel degenerative changes, without critical canal stenosis. Mild  left foraminal narrowing at C6-7.     Mild edema in the posterior soft tissues of the upper cervical spine may  represent mild soft tissue injury. No evidence of ligamentous trauma. Written by Stacy Hernandez MD, [address], as dictated by Dulce Maria Espino MD.  I, Dr. Dulce Maria Espino confirm that all documentation is accurate.

## 2019-05-28 ENCOUNTER — TELEPHONE (OUTPATIENT)
Dept: INTERNAL MEDICINE CLINIC | Age: 49
End: 2019-05-28

## 2019-05-28 DIAGNOSIS — Z00.00 PHYSICAL EXAM: ICD-10-CM

## 2019-05-28 DIAGNOSIS — E04.1 THYROID NODULE: Primary | ICD-10-CM

## 2019-05-28 NOTE — TELEPHONE ENCOUNTER
Pt has an appt 06/18 she plans on going to [facility] to get her labs done prior to appt.  She would like to  please asvise when orders are ready or

## 2019-05-29 LAB
25(OH)D3 SERPL-MCNC: 60.5 NG/ML (ref 32–100)
A-G RATIO,AGRAT: 1.5 RATIO (ref 1.1–2.6)
ALBUMIN SERPL-MCNC: 4.4 G/DL (ref 3.5–5)
ALP SERPL-CCNC: 60 U/L (ref 25–115)
ALT SERPL-CCNC: 10 U/L (ref 5–40)
ANION GAP SERPL CALC-SCNC: 10.9 MMOL/L
AST SERPL W P-5'-P-CCNC: 12 U/L (ref 10–37)
BILIRUB SERPL-MCNC: 0.4 MG/DL (ref 0.2–1.2)
BILIRUB UR QL: NEGATIVE
BUN SERPL-MCNC: 15 MG/DL (ref 6–22)
CALCIUM SERPL-MCNC: 9.5 MG/DL (ref 8.4–10.5)
CHLORIDE SERPL-SCNC: 103 MMOL/L (ref 98–110)
CHOLEST SERPL-MCNC: 185 MG/DL (ref 110–200)
CO2 SERPL-SCNC: 26 MMOL/L (ref 20–32)
CREAT SERPL-MCNC: 0.8 MG/DL (ref 0.5–1.2)
ERYTHROCYTE [DISTWIDTH] IN BLOOD BY AUTOMATED COUNT: 12.8 % (ref 10–15.5)
GFRAA, 66117: >60
GFRNA, 66118: >60
GLOBULIN,GLOB: 3 G/DL (ref 2–4)
GLUCOSE SERPL-MCNC: 99 MG/DL (ref 70–99)
GLUCOSE UR QL: NEGATIVE MG/DL
HCT VFR BLD AUTO: 39 % (ref 35.1–48)
HDLC SERPL-MCNC: 3.4 MG/DL (ref 0–5)
HDLC SERPL-MCNC: 55 MG/DL (ref 40–59)
HGB BLD-MCNC: 13.2 G/DL (ref 11.7–16)
HGB UR QL STRIP: NEGATIVE
KETONES UR QL STRIP.AUTO: NEGATIVE MG/DL
LDLC SERPL CALC-MCNC: 112 MG/DL (ref 50–99)
LEUKOCYTE ESTERASE: NEGATIVE
MCH RBC QN AUTO: 31 PG (ref 26–34)
MCHC RBC AUTO-ENTMCNC: 34 G/DL (ref 31–36)
MCV RBC AUTO: 91 FL (ref 80–95)
NITRITE UR QL STRIP.AUTO: NEGATIVE
PH UR STRIP: 6 PH (ref 5–8)
PLATELET # BLD AUTO: 314 K/UL (ref 140–440)
PMV BLD AUTO: 8.9 FL (ref 9–13)
POTASSIUM SERPL-SCNC: 4.1 MMOL/L (ref 3.5–5.5)
PROT SERPL-MCNC: 7.4 G/DL (ref 6.4–8.3)
PROT UR QL STRIP: NEGATIVE MG/DL
RBC # BLD AUTO: 4.29 M/UL (ref 3.8–5.2)
RBC #/AREA URNS HPF: NEGATIVE /HPF
SODIUM SERPL-SCNC: 140 MMOL/L (ref 133–145)
SP GR UR: 1.01 (ref 1–1.03)
T4 FREE SERPL-MCNC: 1.1 NG/DL (ref 0.9–1.8)
TRIGL SERPL-MCNC: 92 MG/DL (ref 40–149)
TSH SERPL DL<=0.005 MIU/L-ACNC: 0.23 MCU/ML (ref 0.27–4.2)
UROBILINOGEN UR STRIP-MCNC: 0.2 MG/DL
VLDLC SERPL CALC-MCNC: 18 MG/DL (ref 8–30)
WBC # BLD AUTO: 6 K/UL (ref 4–11)
WBC URNS QL MICRO: NEGATIVE /HPF (ref 0–2)

## 2019-06-17 NOTE — PROGRESS NOTES
46 y.o. WHITE OR  female who presents for RPE    She was being followed by Dr. Danika Dougherty although she has not been seen in over a year so they're forcing her to go back as a new pt and so far, no one has scheduled her yet. She is needing help as she feels the eating disorder is kicking back in, would be open to seeing a different provider. She has remained abstinent of etoh for>5 yrs but also afraid of that flaring back up    She has gained a great deal of weight and is wanting to talk to a nutritionist.  Davis Nani to eating too much and not exercising enough    Vitals 6/18/2019 5/3/2019 4/16/2019 3/18/2019 12/17/2018   Weight 207 lb 203 lb 203 lb 206 lb 12.8 oz 207 lb     Vitals 3/23/2018 7/25/2017 7/20/2017 6/12/2017 5/24/2017 9/2/2016   Weight 200 lb  187 lb (No Data) (No Data) 163 lb     Vitals 7/14/2016 7/5/2016   Weight  163 lb     No cardiovascular complaints, she feels deconditioned but is also having subjective difficulty taking as deep a breath as she wants. No wheezing, chronic coughing, not doing any exercise. No pnd, orthopnea, worsening edema, cp, pleurisy    Continues to see Dr Percy Devlin for the GERD. Sees Dr Lena Ramesh group and had mammo. She treats the vit d and is managing the armour thyroid also. A colleague of Dr Letitia Hoyt told her she might have IC also and gave her prn uribel    The botox helped  The migraines a lot but the Devonte Newman has been a life changer for her. Maybe 1-2 episodes a month now    No sx referable to the thyroid, wants us to continue getting yearly US for the nodule    She is interested in seeing rheum for generalized arthralgias. We did serologies last year as below    Past Medical History:   Diagnosis Date    Alcohol abuse     X2-3 yrs    Bulimia     X20 years Dr. Danika Dougherty;   Meg 2011(?)    Constipation     Degenerative arthritis of knee     knees Dr. Percy Devlin    Degenerative disc disease, cervical 2009    C5-6 Dr Antonia Wagner & progesterone    Gastritis 12/12    GERD (gastroesophageal reflux disease) 2009    Meme Abdalla, last EGD 12/12    H/O bone graft 05/22/2017    Dental bone graft for dental implant    Headache(784.0)     Migraines Dr. Kristen Pallas, saw Dr. Gareth Mijares also; Dr Edwin Parra the [facility] for botox; Ajovy for prophylaxis has worked very well    Hydronephrosis of right kidney     Dr Justino Livingston Hypothyroidism     Dr Tori Santiago (??)    Interstitial cystitis     possible per gyn    Lumbar degenerative disc disease 01/2014    L4-5 mild degen disc w mild central stenosis    Obesity (BMI 30-39.9) 3/23/2018    Osteopenia 2/13 FRAX 3.0 and 0.3      DEXA t score 0.4 spine, -1.6 hip (2/13); -0.3 spine, -1.5 hip w FRAX 3.4/0.3 (4/15); w/u neg for secondary causes    PPD positive, treated     age 6    Sleep apnea 2011? on cpap although she's not using Dr. Sheila Castañeda Thyroid nodule     Dr Claudeen Reeves; left 5mm 2014; no change 2015, 2016, 8/17    Venous insufficiency 07/2017     Past Surgical History:   Procedure Laterality Date    HX ABDOMINOPLASTY  2004    and mastopexy, Dr. Jessy Tellez  2093    silicone Dr Pat Gee  8/12    Dr. Phuc Ballesteros 9/16 negative    HX GI  12/12    EGD w gastritis, h pylori neg Dr. Man Diane [facility]    IMPLANT BREAST [facility]      [place] 2004?     NEUROLOGICAL PROCEDURE UNLISTED      EMG negative Dr. Hi Rodriguez  4/16    MRI head negative    RENAL SCOPE,ENDOPYELOTOMY  1999    In [place] after right hydronephrosis     Social History     Socioeconomic History    Marital status:      Spouse name: Not on file    Number of children: 2    Years of education: Not on file    Highest education level: Not on file   Occupational History    Occupation: RN currently housewife     Employer: not employed   Social Needs    Financial resource strain: Not on file    Food insecurity: Worry: Not on file     Inability: Not on file    Transportation needs:     Medical: Not on file     Non-medical: Not on file   Tobacco Use    Smoking status: Former Smoker     Last attempt to quit: 1987     Years since quittin.4    Smokeless tobacco: Never Used   Substance and Sexual Activity    Alcohol use:  Yes     Alcohol/week: 0.0 oz     Comment: abuse-has not drank in four years    Drug use: No     Types: Marijuana     Comment: Former use a long time ago    Sexual activity: Not on file   Lifestyle    Physical activity:     Days per week: Not on file     Minutes per session: Not on file    Stress: Not on file   Relationships    Social connections:     Talks on phone: Not on file     Gets together: Not on file     Attends [employer] service: Not on file     Active member of club or organization: Not on file     Attends meetings of clubs or organizations: Not on file     Relationship status: Not on file    Intimate partner violence:     Fear of current or ex partner: Not on file     Emotionally abused: Not on file     Physically abused: Not on file     Forced sexual activity: Not on file   Other Topics Concern    Not on file   Social History Narrative    Not on file     Family History   Problem Relation Age of Onset    Diabetes Mother     Hypertension Mother     Alcohol abuse Mother     Liver Disease Mother     Depression Sister     Obesity Sister     Cancer Maternal Grandmother         lung    Heart Disease Maternal Grandmother      Immunization History   Administered Date(s) Administered    Hepatitis B Vaccine 1999    Influenza Vaccine 2013, 10/01/2015, 10/01/2016, 10/01/2017    Influenza Vaccine (Quad) PF 10/08/2018    Influenza Vaccine PF 10/07/2014    Influenza Vaccine Whole 2009     No Active Allergies  Current Outpatient Medications on File Prior to Visit   Medication Sig Dispense Refill    fremanezumab-vfrm (AJOVY) 225 mg/1.5 mL syrg by SubCUTAneous route every month.  [facility]-me blue-sod phos-phsal-hyo (URIBEL) 118-10-40.8-36 mg cap capsule Take 1 Cap by mouth four (4) times daily. 120 Cap 3    mometasone (NASONEX) 50 mcg/actuation nasal spray by Nasal route.  progesterone (PROMETRIUM) 200 mg capsule TK 2 CS PO QHS  3    propranolol LA (INDERAL LA) 160 mg capsule TAKE 1 CAPSULE BY MOUTH DAILY 90 Cap 1    carisoprodol (SOMA) 350 mg tablet Take 1 Tab by mouth every eight (8) hours as needed for Muscle Spasm(s). Max Daily Amount: 1,050 mg. 30 Tab 0    rizatriptan (MAXALT) 10 mg tablet TAKE 1 TABLET BY MOUTH 1 TIME AS NEEDED FOR MIGRAINE. MAY REPEAT IN 2 HOURS AS NEEDED 12 Tab 5    MULTIVITAMIN (ONE-A-DAY ESSENTIAL PO) Take  by mouth.  escitalopram oxalate (LEXAPRO) 20 mg tablet Take 20 mg by mouth daily.  Dexlansoprazole (DEXILANT) 60 mg CpDB Take 1 Cap by mouth daily. 90 Cap 3    Cetirizine 10 mg cap Take  by mouth.  acetaminophen (TYLENOL) 325 mg tablet Take  by mouth every four (4) hours as needed for Pain.  ibuprofen (MOTRIN) 200 mg tablet Take  by mouth.  estradiol (MINIVELLE) 0.0375 mg/24 hr 1 Patch by TransDERmal route every Monday and Friday.  melatonin 3 mg tablet Take  by mouth.  thyroid, Pork, (ARMOUR THYROID) 60 mg tablet Take 60 mg by mouth daily.  docusate sodium (COLACE) 100 mg capsule Take 1,600 mg by mouth two (2) times a day. 6 in the am and 10 at night      CALCIUM PO Take 500 mg by mouth three (3) times daily.  Cholecalciferol, Vitamin D3, (VITAMIN D) 2,000 unit Cap Take 1,000 Units by mouth. No current facility-administered medications on file prior to visit.       REVIEW OF SYSTEMS: gyn Dr. Love Pascual 1/19, mammo 1/19, colo 9/16 Dr Heri Tavares  no vision change or eye pain  Oral  no mouth pain, tongue or tooth problems  Ears  no hearing loss, ear pain, fullness  Throat  no swallowing problems  Cardiac  no CP, PND, orthopnea, edema, palpitations or syncope  Chest  no breast masses  Resp  no wheezing, chronic coughing, dyspnea  Urinary  no dysuria, hematuria, flank pain, urgency, frequencybowel habits, bleeding, hemorrhoids  Derm  no nail abnormalities, rashes, lesions of note, hair loss  Constitutional  no wt loss, night sweats, unexplained fevers  Neuro  no focal weakness, numbness, paresthesias, incoordination, ataxia, involuntary movements    Visit Vitals  /79   Pulse 72   Temp 98.9 °F (37.2 °C) (Oral)   Resp 14   Ht 5' 5\" (1.651 m)   Wt 207 lb (93.9 kg)   SpO2 99%   BMI 34.45 kg/m²     Affect is appropriate. Mood stable  No apparent distress  HEENT --Anicteric sclerae, PERRL, EOMI, conjunctiva and lids normal.   Sinuses were nontender. Oropharynx without erythema, normal tongue, oral mucosa and tonsils. No thyromegaly, JVD, or bruits. Lungs --Clear to auscultation, normal percussion. Heart --Regular rate and rhythm, no murmurs, rubs, gallops, or clicks. Chest wall --Nontender to palpation. PMI normal.  Abdomen -- Soft and nontender, no hepatosplenomegaly or masses. Extremities -- Without cyanosis, clubbing. 2+ pulses equally and bilaterally.  1+ nonpitting edema above ankles bilat    LABS:  From 2/12 showed gluc 80,   cr 0.60,    alt 29, chol 156, tg 80, hdl 59, ldl-c 89,        vit d 47.0  From 3/13 showed gluc 78,   cr 0.88,   alt 24, chol 153, tg 64, hdl 62, ldl-c 78,   wbc 5.6, hb 13.3, plt 334, tsh 1.30, ua neg  From 4/13 showed                    vit d 54.1, spep neg, pth 19  From 3/14 showed gluc 87,   cr 0.80, gfr>60, alt 25, chol 154, tg 51, hdl 92, ldl-c 85,   wbc 6.1, hb 13.3, plt 331  From 3/15 showed gluc 102, cr 0.60, gfr>60, alt 10, chol 161, tg 73, hdl 55, ldl-c 92,   wbc 6.8, hb 13.4, plt 296, tsh 0.88, vit d 69.5  From 6/16 showed gluc 79,   cr 0.70, gfr>60, alt 12, chol 196, tg 93, hdl 62, ldl-c 116, wbc 5.5, hb 12.5, plt 336, tsh 0.41  From 3/18 showed gluc 94,   cr 0.60, gfr>60, alt 16, chol 171, tg 97, hdl 60, ldl-c 91,   wbc 5.9, hb 13.2, plt 304,     ua neg  From 5/18 showed                vit d 60.5, damaris neg, ra neg, ccp neg, b12 425, fol 14.4, esr 2, crp 0.2, syphilis neg    Results for orders placed or performed in visit on 05/29/19   CBC W/O DIFF   Result Value Ref Range    WBC 6.0 4.0 - 11.0 K/uL    RBC 4.29 3.80 - 5.20 M/uL    HGB 13.2 11.7 - 16.0 g/dL    HCT 39.0 35.1 - 48.0 %    MCV 91 80 - 95 fL    MCH 31 26 - 34 pg    MCHC 34 31 - 36 g/dL    RDW 12.8 10.0 - 15.5 %    PLATELET 228 257 - 211 K/uL    MPV 8.9 (L) 9.0 - 13.0 fL   URINALYSIS W/MICROSCOPIC   Result Value Ref Range    Specific Gravity 1.010 1.005 - 1.03    pH (UA) 6.0 5.0 - 8.0 pH    Protein Negative Negative, mg/dL    Glucose Negative Negative mg/dL    Ketone Negative Negative, mg/dL    Bilirubin Negative Negative    Blood Negative Negative    Nitrites Negative Negative    Leukocyte Esterase Negative Negative    Urobilinogen 0.2 <2.0 mg/dL    WBC Negative 0 - 2 /hpf    RBC Negative Negative, /hpf   TSH 3RD GENERATION   Result Value Ref Range    TSH 0.23 (L) 0.27 - 3.40 mcU/mL   METABOLIC PANEL, COMPREHENSIVE   Result Value Ref Range    Glucose 99 70 - 99 mg/dL    BUN 15 6 - 22 mg/dL    Creatinine 0.8 0.5 - 1.2 mg/dL    Sodium 140 133 - 145 mmol/L    Potassium 4.1 3.5 - 5.5 mmol/L    Chloride 103 98 - 110 mmol/L    CO2 26 20 - 32 mmol/L    AST (SGOT) 12 10 - 37 U/L    ALT (SGPT) 10 5 - 40 U/L    Alk.  phosphatase 60 25 - 115 U/L    Bilirubin, total 0.4 0.2 - 1.2 mg/dL    Calcium 9.5 8.4 - 10.5 mg/dL    Protein, total 7.4 6.4 - 8.3 g/dL    Albumin 4.4 3.5 - 5.0 g/dL    A-G Ratio 1.5 1.1 - 2.6 ratio    Globulin 3.0 2.0 - 4.0 g/dL    Anion gap 10.9 mmol/L    GFRAA >60.0 >60.0    GFRNA >60.0 >60.0   T4, FREE   Result Value Ref Range    T4, Free 1.1 0.9 - 1.8 ng/dL   LIPID PANEL   Result Value Ref Range    Triglyceride 92 40 - 149 mg/dL    HDL Cholesterol 55 40 - 59 mg/dL    Cholesterol, total 185 110 - 200 mg/dL    CHOLESTEROL/HDL 3.4 0.0 - 5.0    LDL, calculated 112 (H) 50 - 99 mg/dL VLDL, calculated 18 8 - 30 mg/dL   VITAMIN D, 25 HYDROXY   Result Value Ref Range    VITAMIN D, 25-HYDROXY 60.5 32.0 - 100.0 ng/mL     Patient Active Problem List   Diagnosis Code    Bulimia Dr. Messina Edson Dr. Warren Perry G43.909    DJD knees Dr. Key Arango M19.90    Osteopenia 2/13 FRAX 3.0 and 0.3  M85.80    GERD without esophagitis K21.9    Bilateral leg edema R60.0    Asymptomatic varicose veins I83.90    Thyroid nodule E04.1    Obesity (BMI 30-39. 9) E66.9     ASSESSMENT AND PLAN:  1. Psychiatric. Will try to get in w [facility] psych  2. Migraines. Continue current and f/u Dr. Sukumar Rivero  3. Thyroid nodule. US to be done  4. GERD and constipation. F/U Dr Key Arango  5. Osteopenia. On ca/d, encouraged wt bearing exercises  6. Edema. Prn diuretic  7. Endocrine/gyn. F/U Dr. Bruce Small, she will call about the low tsh  8. Arthralgia. Will ask for rheum opinion  9. Obesity. Will send to nutritionist per her request  10. Subjective sob. PFTs ordered        RTC 6/20    Above conditions discussed at length and patient vocalized understanding.   All questions answered to patient satifaction

## 2019-06-18 ENCOUNTER — TELEPHONE (OUTPATIENT)
Dept: INTERNAL MEDICINE CLINIC | Age: 49
End: 2019-06-18

## 2019-06-18 ENCOUNTER — OFFICE VISIT (OUTPATIENT)
Dept: INTERNAL MEDICINE CLINIC | Age: 49
End: 2019-06-18

## 2019-06-18 VITALS
OXYGEN SATURATION: 99 % | RESPIRATION RATE: 14 BRPM | DIASTOLIC BLOOD PRESSURE: 79 MMHG | BODY MASS INDEX: 34.49 KG/M2 | SYSTOLIC BLOOD PRESSURE: 130 MMHG | WEIGHT: 207 LBS | HEART RATE: 72 BPM | TEMPERATURE: 98.9 F | HEIGHT: 65 IN

## 2019-06-18 DIAGNOSIS — G89.29 CHRONIC ARTHRALGIAS OF KNEES AND HIPS: ICD-10-CM

## 2019-06-18 DIAGNOSIS — M25.551 CHRONIC ARTHRALGIAS OF KNEES AND HIPS: ICD-10-CM

## 2019-06-18 DIAGNOSIS — E04.1 THYROID NODULE: Primary | ICD-10-CM

## 2019-06-18 DIAGNOSIS — E04.1 THYROID NODULE: ICD-10-CM

## 2019-06-18 DIAGNOSIS — Z00.00 PHYSICAL EXAM: ICD-10-CM

## 2019-06-18 DIAGNOSIS — F41.9 ANXIETY: ICD-10-CM

## 2019-06-18 DIAGNOSIS — M25.562 CHRONIC ARTHRALGIAS OF KNEES AND HIPS: ICD-10-CM

## 2019-06-18 DIAGNOSIS — F10.20 ALCOHOLISM (HCC): ICD-10-CM

## 2019-06-18 DIAGNOSIS — Z00.00 PHYSICAL EXAM: Primary | ICD-10-CM

## 2019-06-18 DIAGNOSIS — M25.561 CHRONIC ARTHRALGIAS OF KNEES AND HIPS: ICD-10-CM

## 2019-06-18 DIAGNOSIS — R60.9 EDEMA, UNSPECIFIED TYPE: ICD-10-CM

## 2019-06-18 DIAGNOSIS — I87.2 VENOUS INSUFFICIENCY: ICD-10-CM

## 2019-06-18 DIAGNOSIS — E66.9 OBESITY (BMI 30-39.9): ICD-10-CM

## 2019-06-18 DIAGNOSIS — R06.09 DOE (DYSPNEA ON EXERTION): ICD-10-CM

## 2019-06-18 DIAGNOSIS — R63.0 ANOREXIA: ICD-10-CM

## 2019-06-18 DIAGNOSIS — M25.552 CHRONIC ARTHRALGIAS OF KNEES AND HIPS: ICD-10-CM

## 2019-06-18 DIAGNOSIS — F50.2 BULIMIA: ICD-10-CM

## 2019-06-18 RX ORDER — POTASSIUM CHLORIDE 20 MEQ/1
TABLET, EXTENDED RELEASE ORAL
Qty: 30 TAB | Refills: 6 | Status: SHIPPED | OUTPATIENT
Start: 2019-06-18

## 2019-06-18 RX ORDER — FUROSEMIDE 20 MG/1
TABLET ORAL
Qty: 30 TAB | Refills: 6 | Status: SHIPPED | OUTPATIENT
Start: 2019-06-18

## 2019-06-18 NOTE — PROGRESS NOTES
Denise Benzbons presents today for   Chief Complaint   Patient presents with    Physical     labs              Depression Screening:  3 most recent PHQ Screens 6/18/2019   PHQ Not Done Active Diagnosis of Depression or Bipolar Disorder   Little interest or pleasure in doing things Not at all   Feeling down, depressed, irritable, or hopeless Not at all   Total Score PHQ 2 0       Learning Assessment:  Learning Assessment 3/17/2016   PRIMARY LEARNER Patient   HIGHEST LEVEL OF EDUCATION - PRIMARY LEARNER  -   BARRIERS PRIMARY LEARNER -   CO-LEARNER CAREGIVER -   PRIMARY LANGUAGE ENGLISH   LEARNER PREFERENCE PRIMARY OTHER (COMMENT)   ANSWERED BY patient   RELATIONSHIP SELF       Abuse Screening:  Abuse Screening Questionnaire 6/18/2019   Do you ever feel afraid of your partner? N   Are you in a relationship with someone who physically or mentally threatens you? N   Is it safe for you to go home? Y       Fall Risk  No flowsheet data found. Coordination of Care:  1. Have you been to the ER, urgent care clinic since your last visit? Hospitalized since your last visit? no    2. Have you seen or consulted any other health care providers outside of the [address] [place] [address] since your last visit? Include any pap smears or colon screening.  no

## 2019-06-21 ENCOUNTER — TELEPHONE (OUTPATIENT)
Dept: INTERNAL MEDICINE CLINIC | Age: 49
End: 2019-06-21

## 2019-06-21 NOTE — TELEPHONE ENCOUNTER
PT wants U/S  Done at [facility]- please redo order and call PT when ready to

## 2019-07-11 DIAGNOSIS — G43.009 MIGRAINE WITHOUT AURA AND WITHOUT STATUS MIGRAINOSUS, NOT INTRACTABLE: ICD-10-CM

## 2019-07-12 RX ORDER — PROPRANOLOL HYDROCHLORIDE 160 MG/1
CAPSULE, EXTENDED RELEASE ORAL
Qty: 90 CAP | Refills: 0 | Status: SHIPPED | OUTPATIENT
Start: 2019-07-12 | End: 2019-10-28 | Stop reason: SDUPTHER

## 2019-11-12 ENCOUNTER — OFFICE VISIT (OUTPATIENT)
Dept: ORTHOPEDIC SURGERY | Age: 49
End: 2019-11-12

## 2019-11-12 VITALS
WEIGHT: 186.2 LBS | TEMPERATURE: 98.5 F | BODY MASS INDEX: 29.92 KG/M2 | HEART RATE: 63 BPM | HEIGHT: 66 IN | OXYGEN SATURATION: 100 % | DIASTOLIC BLOOD PRESSURE: 75 MMHG | SYSTOLIC BLOOD PRESSURE: 123 MMHG | RESPIRATION RATE: 16 BRPM

## 2019-11-12 DIAGNOSIS — M25.551 BILATERAL HIP PAIN: Primary | ICD-10-CM

## 2019-11-12 DIAGNOSIS — M79.18 MYOFASCIAL PAIN: ICD-10-CM

## 2019-11-12 DIAGNOSIS — M25.511 RIGHT SHOULDER PAIN, UNSPECIFIED CHRONICITY: ICD-10-CM

## 2019-11-12 DIAGNOSIS — M47.812 CERVICAL FACET JOINT SYNDROME: ICD-10-CM

## 2019-11-12 DIAGNOSIS — M25.552 BILATERAL HIP PAIN: Primary | ICD-10-CM

## 2019-11-12 RX ORDER — DICLOFENAC SODIUM 10 MG/G
GEL TOPICAL
Qty: 500 G | Refills: 1 | Status: SHIPPED | OUTPATIENT
Start: 2019-11-12 | End: 2020-12-15

## 2019-11-12 RX ORDER — PREGABALIN 75 MG/1
CAPSULE ORAL
Qty: 42 CAP | Refills: 0 | Status: SHIPPED | OUTPATIENT
Start: 2019-11-12 | End: 2020-12-15

## 2019-11-12 RX ORDER — OMEPRAZOLE 20 MG/1
20 TABLET, DELAYED RELEASE ORAL DAILY
COMMUNITY

## 2019-11-12 RX ORDER — VORTIOXETINE 10 MG/1
10 TABLET, FILM COATED ORAL DAILY
Refills: 2 | COMMUNITY
Start: 2019-10-14 | End: 2020-12-15

## 2019-11-12 RX ORDER — ERENUMAB-AOOE 70 MG/ML
1 INJECTION SUBCUTANEOUS
COMMUNITY
Start: 2019-11-06

## 2019-11-12 RX ORDER — PREGABALIN 50 MG/1
50 CAPSULE ORAL 2 TIMES DAILY
Status: CANCELLED | OUTPATIENT
Start: 2019-11-12

## 2019-11-12 RX ORDER — TOPIRAMATE 100 MG/1
100 TABLET, FILM COATED ORAL DAILY
Refills: 2 | COMMUNITY
Start: 2019-10-11 | End: 2020-12-15

## 2019-11-12 NOTE — LETTER
11/14/19 Patient: Kristan Valadez YOB: 1970 Date of Visit: 11/12/2019 Sarah Ramesh MD 
[address] [place] [address] VIA In Basket Dear Sarah Ramesh MD, Thank you for referring Ms. Charline Bennett to [facility] for evaluation. My notes for this consultation are attached. If you have questions, please do not hesitate to call me. I look forward to following your patient along with you. Sincerely, John Tatum MD

## 2019-11-12 NOTE — PATIENT INSTRUCTIONS
Shoulder Arthritis: Exercises  Introduction  Here are some examples of exercises for you to try. The exercises may be suggested for a condition or for rehabilitation. Start each exercise slowly. Ease off the exercises if you start to have pain. You will be told when to start these exercises and which ones will work best for you. How to do the exercises  Shoulder flexion (lying down)    1. Lie on your back, holding a wand with both hands. Your palms should face down as you hold the wand. 2. Keeping your elbows straight, slowly raise your arms over your head. Raise them until you feel a stretch in your shoulders, upper back, and chest.  3. Hold for 15 to 30 seconds. 4. Repeat 2 to 4 times. Shoulder rotation (lying down)    1. Lie on your back. Hold a wand with both hands with your elbows bent and palms up. 2. Keep your elbows close to your body, and move the wand across your body toward the sore arm. 3. Hold for 8 to 12 seconds. 4. Repeat 2 to 4 times. Shoulder internal rotation with towel    1. Hold a towel above and behind your head with the arm that is not sore. 2. With your sore arm, reach behind your back and grasp the towel. 3. With the arm above your head, pull the towel upward. Do this until you feel a stretch on the front and outside of your sore shoulder. 4. Hold 15 to 30 seconds. 5. Repeat 2 to 4 times. Shoulder blade squeeze    1. Stand with your arms at your sides, and squeeze your shoulder blades together. Do not raise your shoulders up as you squeeze. 2. Hold 6 seconds. 3. Repeat 8 to 12 times. Resisted rows    1. Put the band around a solid object at about waist level. (A bedpost will work well.) Each hand should hold an end of the band. 2. With your elbows at your sides and bent to 90 degrees, pull the band back. Your shoulder blades should move toward each other. Return to the starting position. 3. Repeat 8 to 12 times.     External rotator strengthening exercise    1. Start by tying a piece of elastic exercise material to a doorknob. You can use surgical tubing or Thera-Band. (You may also hold one end of the band in each hand.)  2. Stand or sit with your shoulder relaxed and your elbow bent 90 degrees. Your upper arm should rest comfortably against your side. Squeeze a rolled towel between your elbow and your body for comfort. This will help keep your arm at your side. 3. Hold one end of the elastic band with the hand of the painful arm. 4. Start with your forearm across your belly. Slowly rotate the forearm out away from your body. Keep your elbow and upper arm tucked against the towel roll or the side of your body until you begin to feel tightness in your shoulder. Slowly move your arm back to where you started. 5. Repeat 8 to 12 times. Internal rotator strengthening exercise    1. Start by tying a piece of elastic exercise material to a doorknob. You can use surgical tubing or Thera-Band. 2. Stand or sit with your shoulder relaxed and your elbow bent 90 degrees. Your upper arm should rest comfortably against your side. Squeeze a rolled towel between your elbow and your body for comfort. This will help keep your arm at your side. 3. Hold one end of the elastic band in the hand of the painful arm. 4. Slowly rotate your forearm toward your body until it touches your belly. Slowly move it back to where you started. 5. Keep your elbow and upper arm firmly tucked against the towel roll or at your side. 6. Repeat 8 to 12 times. Pendulum swing    1. Hold on to a table or the back of a chair with your good arm. Then bend forward a little and let your sore arm hang straight down. This exercise does not use the arm muscles. Rather, use your legs and your hips to create movement that makes your arm swing freely. 2. Use the movement from your hips and legs to guide the slightly swinging arm back and forth like a pendulum (or elephant trunk).  Then guide it in circles that start small (about the size of a dinner plate). Make the circles a bit larger each day, as your pain allows. 3. Do this exercise for 5 minutes, 5 to 7 times each day. 4. As you have less pain, try bending over a little farther to do this exercise. This will increase the amount of movement at your shoulder. Follow-up care is a key part of your treatment and safety. Be sure to make and go to all appointments, and call your doctor if you are having problems. It's also a good idea to know your test results and keep a list of the medicines you take. Where can you learn more? Go to http://altagracia-raysa.info/. Enter H562 in the search box to learn more about \"Shoulder Arthritis: Exercises. \"  Current as of: June 26, 2019  Content Version: 12.2  © 1943-4466 [employer]. Care instructions adapted under license by [employer] (which disclaims liability or warranty for this information). If you have questions about a medical condition or this instruction, always ask your healthcare professional. [address] any warranty or liability for your use of this information. Neck Arthritis: Exercises  Introduction  Here are some examples of exercises for you to try. The exercises may be suggested for a condition or for rehabilitation. Start each exercise slowly. Ease off the exercises if you start to have pain. You will be told when to start these exercises and which ones will work best for you. How to do the exercises  Neck stretches to the side    5. This stretch works best if you keep your shoulder down as you lean away from it. To help you remember to do this, start by relaxing your shoulders and lightly holding on to your thighs or your chair. 6. Tilt your head toward your shoulder and hold for 15 to 30 seconds. Let the weight of your head stretch your muscles. 7. Repeat 2 to 4 times toward each shoulder. Chin tuck    5.  Lie on the floor with a rolled-up towel under your neck. Your head should be touching the floor. 6. Slowly bring your chin toward your chest.  7. Hold for a count of 6, and then relax for up to 10 seconds. 8. Repeat 8 to 12 times. Active cervical rotation    6. Sit in a firm chair, or stand up straight. 7. Keeping your chin level, turn your head to the right, and hold for 15 to 30 seconds. 8. Turn your head to the left and hold for 15 to 30 seconds. 9. Repeat 2 to 4 times to each side. Shoulder blade squeeze    4. While standing, squeeze your shoulder blades together. 5. Do not raise your shoulders up as you are squeezing. 6. Hold for 6 seconds. 7. Repeat 8 to 12 times. Shoulder rolls    4. Sit comfortably with your feet shoulder-width apart. You can also do this exercise standing up. 5. Roll your shoulders up, then back, and then down in a smooth, circular motion. 6. Repeat 2 to 4 times. Follow-up care is a key part of your treatment and safety. Be sure to make and go to all appointments, and call your doctor if you are having problems. It's also a good idea to know your test results and keep a list of the medicines you take. Where can you learn more? Go to http://altagracia-raysa.info/. Enter D963 in the search box to learn more about \"Neck Arthritis: Exercises. \"  Current as of: June 26, 2019  Content Version: 12.2  © 1784-3532 [employer]. Care instructions adapted under license by [employer] (which disclaims liability or warranty for this information). If you have questions about a medical condition or this instruction, always ask your healthcare professional. [address] any warranty or liability for your use of this information.

## 2019-11-12 NOTE — PROGRESS NOTES
[facility]  [address] [place], [address]  Phone: [phone]  Fax: [phone]    Pt's YOB: 1970    ASSESSMENT   Diagnoses and all orders for this visit:    1. Bilateral hip pain  -     AMB POC X-RAY HIPS, BILAT, 3-4 VIEWS    2. Right shoulder pain, unspecified chronicity  -     diclofenac (VOLTAREN) 1 % gel; Apply 4 grams to right shoulder up to 4 times per day, maximum 16 grams per joint per day. Dispense 5 100 gram tubes  -     REFERRAL TO PHYSICAL THERAPY    3. Cervical facet joint syndrome  -     REFERRAL TO PHYSICAL THERAPY    4. Myofascial pain  -     pregabalin (LYRICA) 75 mg capsule; Take 1 tab TID by mouth       IMPRESSION AND PLAN:  Samson Vines is a 44 y.o. female with history of cervical pain and presents to the office today for follow up. She reports improvement with physical therapy and dry needling with [facility] [address] since her last office visit. She  complains of right shoulder pain. She has difficulties reaching over her head and behind her back. She also complains of bilateral hip pain. She is taking Trentellix. 1) Pt was given information on shoulder and cervical arthritis exercises. 2) She was referred to physical therapy with dry needling. 3) She was prescribed Voltaren gel 1%. 4) She was prescribed Lyrica 75 mg 1 tab In the evening and then start 1 QAM 1 QHS. 5) I recommend she takes B complex vitamin daily. 6) I recommended the Pt try water exercise, michelle chi, and yoga. 7) Ms. Eloina Hurley has a reminder for a \"due or due soon\" health maintenance. I have asked that she contact her primary care provider, Santa Prasad MD, for follow-up on this health maintenance. 8)  demonstrated consistency with prescribing.    9) Pt will follow up in 4 weeks to assess PT and medication         HISTORY OF PRESENT ILLNESS:  Samson Vines is a 44 y.o. female with history of cervical pain and presents to the office today for follow up. She reports improvement with physical therapy and dry needling with Rahul Smith since her last office visit. She complains of right shoulder pain. She has difficulties reaching over her head and behind her back. She also complains of bilateral hip pain and notes a burning sensation. She admits to having bladder issues and she saw Dr. Aisha Danielson and they mentioned interstitial cystitis and she be referred to a rheumatologist. She ws also told she has hypermobility of her joints and she was tested 3 years ago to check for Sjogrens which ruled out rheumatoid arthritis. She states she does not take pain medicine due to her migraines. She has taken Topamax 100 mg but notes she does not tolerate this medication due to feeling \"spacing out\" wih this medication. She is also takingTrentellix and notes she has a hx of bulimia and depression. The Carafate is helpful when she takes the Prednisone. At this time patient wishes to proceed with medication evaluation and PT. Pain Scale: /10    PCP: Carey Pearson MD     Past Medical History:   Diagnosis Date    Alcohol abuse     X2-3 yrs    Bulimia     X20 years Dr. Julian Varela; Meg 2011(?)    Constipation     Degenerative arthritis of knee     knees Dr. Santo Epperson Degenerative disc disease, cervical 2009    C5-6 Dr Ayla Burnett disorder     [address] Gastritis 12/12    GERD (gastroesophageal reflux disease) 2009    Thelma Kinsey, last EGD 12/12    H/O bone graft 05/22/2017    Dental bone graft for dental implant    Headache(784.0)     Migraines Dr. Christiano Mera, saw Dr. Angie Khan also; Dr Brandon Gomez the Dr Mavis Mock for botox;  Ajovy for prophylaxis has worked very well    Hydronephrosis of right kidney     Dr Escalante Double Hypothyroidism     Dr Anabel Chappell (??)    Interstitial cystitis     possible per gyn    Lumbar degenerative disc disease 01/2014    L4-5 mild degen disc w mild central stenosis    Obesity (BMI 30-39.9) 3/23/2018  Osteopenia  FRAX 3.0 and 0.3      DEXA t score 0.4 spine, -1.6 hip (); -0.3 spine, -1.5 hip w FRAX 3.4/0.3 (4/15); w/u neg for secondary causes    PPD positive, treated     age 6    Sleep apnea ? on cpap although she's not using Dr. Barnes Hence Thyroid nodule     Dr Rashmi Roldan; left 5mm ; no change , ,     Venous insufficiency 2017        Social History     Socioeconomic History    Marital status:      Spouse name: Not on file    Number of children: 2    Years of education: Not on file    Highest education level: Not on file   Occupational History    Occupation: RN currently housewife     Employer: not employed   Social Needs    Financial resource strain: Not on file    Food insecurity:     Worry: Not on file     Inability: Not on file   [place] needs:     Medical: Not on file     Non-medical: Not on file   Tobacco Use    Smoking status: Former Smoker     Last attempt to quit: 1987     Years since quittin.8    Smokeless tobacco: Never Used   Substance and Sexual Activity    Alcohol use:  Yes     Alcohol/week: 0.0 standard drinks     Comment: abuse-has not drank in four years    Drug use: No     Types: Marijuana     Comment: Former use a long time ago    Sexual activity: Not on file   Lifestyle    Physical activity:     Days per week: Not on file     Minutes per session: Not on file    Stress: Not on file   Relationships    Social connections:     Talks on phone: Not on file     Gets together: Not on file     Attends Latter-day service: Not on file     Active member of club or organization: Not on file     Attends meetings of clubs or organizations: Not on file     Relationship status: Not on file    Intimate partner violence:     Fear of current or ex partner: Not on file     Emotionally abused: Not on file     Physically abused: Not on file     Forced sexual activity: Not on file   Other Topics Concern    Not on file   Social History Narrative    Not on file       Current Outpatient Medications   Medication Sig Dispense Refill    pregabalin (LYRICA) 75 mg capsule Take 1 tab TID by mouth 42 Cap 0    diclofenac (VOLTAREN) 1 % gel Apply 4 grams to right shoulder up to 4 times per day, maximum 16 grams per joint per day. Dispense 5 100 gram tubes 500 g 1    propranolol LA (INDERAL LA) 160 mg capsule TAKE 1 CAPSULE BY MOUTH DAILY 90 Cap 2    potassium chloride (K-DUR, KLOR-CON) 20 mEq tablet Take once daily as needed w furosemide 30 Tab 6    furosemide (LASIX) 20 mg tablet Take once daily as needed for edema 30 Tab 6    mth-me blue-sod phos-phsal-hyo (URIBEL) 118-10-40.8-36 mg cap capsule Take 1 Cap by mouth four (4) times daily. (Patient taking differently: Take 1 Cap by mouth four (4) times daily as needed.) 120 Cap 3    mometasone (NASONEX) 50 mcg/actuation nasal spray 2 Sprays by Both Nostrils route daily.  progesterone (PROMETRIUM) 200 mg capsule TK 2 CS PO QHS  3    rizatriptan (MAXALT) 10 mg tablet TAKE 1 TABLET BY MOUTH 1 TIME AS NEEDED FOR MIGRAINE. MAY REPEAT IN 2 HOURS AS NEEDED 12 Tab 5    MULTIVITAMIN (ONE-A-DAY ESSENTIAL PO) Take  by mouth.  Dexlansoprazole (DEXILANT) 60 mg CpDB Take 1 Cap by mouth daily. 90 Cap 3    Cetirizine 10 mg cap Take 10 mg by mouth daily.  acetaminophen (TYLENOL) 325 mg tablet Take  by mouth every four (4) hours as needed for Pain.  ibuprofen (MOTRIN) 200 mg tablet Take 600 mg by mouth every eight (8) hours as needed.  estradiol (MINIVELLE) 0.0375 mg/24 hr 1 Patch by TransDERmal route two (2) times a week on Wednesday and Saturday.  melatonin 3 mg tablet Take 3 mg by mouth nightly.  thyroid, Pork, (ARMOUR THYROID) 60 mg tablet Take 60 mg by mouth daily.  docusate sodium (COLACE) 100 mg capsule Take 1,600 mg by mouth two (2) times a day. 6 in the am and 10 at night      CALCIUM PO Take 500 mg by mouth two (2) times a day.       Cholecalciferol, Vitamin D3, (VITAMIN D) 2,000 unit Cap Take 1,000 Units by mouth daily.  AIMOVIG AUTOINJECTOR 70 mg/mL injection 1 mL by SubCUTAneous route every thirty (30) days.  topiramate (TOPAMAX) 100 mg tablet Take 100 mg by mouth daily. 2    TRINTELLIX 10 mg tablet Take 10 mg by mouth daily. 2    omeprazole (PRILOSEC OTC) 20 mg tablet Take 20 mg by mouth daily.  fremanezumab-vfrm (AJOVY) 225 mg/1.5 mL syrg by SubCUTAneous route every month.  carisoprodol (SOMA) 350 mg tablet Take 1 Tab by mouth every eight (8) hours as needed for Muscle Spasm(s). Max Daily Amount: 1,050 mg. (Patient not taking: Reported on 11/12/2019) 30 Tab 0    escitalopram oxalate (LEXAPRO) 20 mg tablet Take 20 mg by mouth daily. No Known Allergies      REVIEW OF SYSTEMS    Constitutional: Negative for fever, chills, or weight change. Respiratory: Negative for cough or shortness of breath. Cardiovascular: Negative for chest pain or palpitations. Gastrointestinal: Negative for acid reflux, change in bowel habits, or constipation. Genitourinary: Negative for dysuria and flank pain. Musculoskeletal: Positive for bilateral hip and right shoulder, and cervical pain. Skin: Negative for rash. Neurological: Positive for headaches Negative for dizziness or numbness. Endo/Heme/Allergies: Negative for increased bruising. Psychiatric/Behavioral: Positive for difficulty with sleep. PHYSICAL EXAMINATION  Visit Vitals  /75   Pulse 63   Temp 98.5 °F (36.9 °C) (Oral)   Resp 16   Ht 5' 5.5\" (1.664 m)   Wt 186 lb 3.2 oz (84.5 kg)   SpO2 100%   BMI 30.51 kg/m²       Constitutional: Awake, alert, and in no acute distress. Neurological: 1+ symmetrical DTRs in the upper extremities. Sensation to light touch is intact. Negative Ilan's sign bilaterally. Skin: warm, dry, and intact. Musculoskeletal: Improved range of motion with side to side cervical flexion. Tight across the upper trapezius.  No pain with extension, axial loading, or forward flexion. No pain with internal or external rotation of her hips. Negative straight leg raise bilaterally. Positive Neers on right negative on the left. Negative Hoffmans bilaterally. Biceps  Triceps Deltoids Wrist Ext Wrist Flex Hand Intrin   Right +4/5 +4/5 +4/5 +4/5 +4/5 +4/5   Left +4/5 +4/5 +4/5 +4/5 +4/5 +4/5      Hip Flex  Quads Hamstrings Ankle DF EHL Ankle PF   Right -4/5 -4/5 -4/5 +4/5 +4/5 +4/5   Left -4/5 -4/5 -4/5 +4/5 +4/5 +4/5       IMAGING:  AP Pelvis X-rays from 11/12/2019 were personally reviewed with the patient and demonstrated:   IUD is present, no significant degenerative changes. Cervical spine 2V x-rays from 12/17/2018 were personally reviewed with the patient and demonstrated:  Straightening of the cervical spine. Mild degenerative facets in the lower cervical region.      Cervical MRI from 09/26/2013 was reviewed and demonstrated:  MRI of cervical spine without contrast     CPT CODE: 73805     HISTORY: Pain, radiculopathy     COMPARISON: None     FINDINGS:     The visualized posterior fossa contents look normal. The prevertebral soft  tissues look normal.     The cervical cord is normal in signal and caliber.     There is mild reversal of the normal cervical lordosis. No significant  listhesis. Vertebral body heights are maintained. No pathologic signal  abnormality within the bone marrow. There is mild edema in the posterior soft  tissues in the region of the upper cervical spine, reference sagittal inversion  recovery image  7.     Disc heights are preserved.     C1-2: Normal     C2-3: Normal     C3-4: Normal     C4-5: Minimal posterior disc bulge. No significant foraminal or canal stenosis.       C5-6: Mild uncovertebral and facet arthropathy, without significant canal or  foraminal stenosis.     C6-7: Minimal posterior disc bulge. Mild uncovertebral and facet arthropathy. Posterior ligamentous thickening. Canal remains patent.  Mild left foraminal  narrowing.     C7-T1: Normal     IMPRESSION:  Mild multilevel degenerative changes, without critical canal stenosis. Mild  left foraminal narrowing at C6-7.     Mild edema in the posterior soft tissues of the upper cervical spine may  represent mild soft tissue injury. No evidence of ligamentous trauma. Written by Marybel Morales MD, [address], as dictated by Ai Miller MD.  I, Dr. Ai Miller confirm that all documentation is accurate.

## 2020-01-16 ENCOUNTER — OFFICE VISIT (OUTPATIENT)
Dept: INTERNAL MEDICINE CLINIC | Age: 50
End: 2020-01-16

## 2020-01-16 VITALS
HEIGHT: 66 IN | HEART RATE: 63 BPM | DIASTOLIC BLOOD PRESSURE: 88 MMHG | OXYGEN SATURATION: 99 % | WEIGHT: 181 LBS | RESPIRATION RATE: 14 BRPM | SYSTOLIC BLOOD PRESSURE: 138 MMHG | BODY MASS INDEX: 29.09 KG/M2 | TEMPERATURE: 99.6 F

## 2020-01-16 DIAGNOSIS — R63.4 UNEXPLAINED WEIGHT LOSS: Primary | ICD-10-CM

## 2020-01-16 DIAGNOSIS — E03.9 ACQUIRED HYPOTHYROIDISM: ICD-10-CM

## 2020-01-16 RX ORDER — DULOXETIN HYDROCHLORIDE 30 MG/1
30 CAPSULE, DELAYED RELEASE ORAL DAILY
COMMUNITY
End: 2020-12-16 | Stop reason: CLARIF

## 2020-01-16 NOTE — PROGRESS NOTES
44 y.o. WHITE OR  female who presents for evaluation. At the last visit in 6/19, we checked her labs and everything came back okay. However, Dr. Cain Guzman is managing her thyroid and she \"lets her run high\". Review of the last few TSHs show mostly below 1. She does not feel she is hyperthyroid from a symptom point of view    Main concern today is unexplained weight loss of roughly 26 pounds since we saw her last.  She is concerned because she started eating more than she normally does and did not gain any weight. Of note, no fevers, night sweats, nausea, vomiting, diarrhea, change in bowel habits. She has not changed her dietary choices nor has she increased exercise. She is now seeing Dr. Anali Vallejo for psychiatry; saw Dr. Paige Obrien briefly and was diagnosed with fibromyalgia, and never did see the nutritionist.  She was changed over to cymbalta per Dr Jay rTeadwell suggestion    Past Medical History:   Diagnosis Date    Alcohol abuse     Bulimia     X20 years Dr. Mell Mccall; Meg 2011(?); now seeing Dr Anali Vallejo    Constipation     Degenerative arthritis of knee     knees Dr. Catracho Stanley Degenerative disc disease, cervical 2009    C5-6 Dr Tod Kapoor Gastritis 12/12    GERD (gastroesophageal reflux disease) 2009    Darian Acea, last EGD 12/12    H/O bone graft 05/22/2017    Dental bone graft for dental implant    Headache(784.0)     Migraines Dr. Taylor Barrientos, saw Dr. Leila Oden also; Dr Alyssa Hall the  [facility] for botox;  Ajovy for prophylaxis has worked very well    Hydronephrosis of right kidney     Dr Danie Rangel Hypothyroidism     Dr Love Salmon Interstitial cystitis     possible per gyn    Lumbar degenerative disc disease 01/2014    L4-5 mild degen disc w mild central stenosis    Obesity (BMI 30-39.9) 3/23/2018    Osteopenia 2/13 FRAX 3.0 and 0.3      DEXA t score 0.4 spine, -1.6 hip (2/13); -0.3 spine, -1.5 hip w FRAX 3.4/0.3 (4/15); w/u neg for secondary causes    PPD positive, treated     age 7    Sleep apnea ? on cpap although she's not using Dr. Master Davis Thyroid nodule     Dr Manuel Jenkins; left 5mm ; no change , ,     Venous insufficiency 2017     Past Surgical History:   Procedure Laterality Date    HX ABDOMINOPLASTY      and mastopexy, Dr. Julius Cranker      silicone Dr Reza Morton      Dr. Chambers Corporal      Dr Darlyn Henderson  negative    HX GI      EGD w gastritis, h pylori neg Dr. Ginette Stewart HX GYN  [place] X 2    IMPLANT BREAST [address]      [place] ?  NEUROLOGICAL PROCEDURE UNLISTED      EMG negative Dr. Mannie Talamantes      MRI head negative    RENAL SCOPE[place]ENDOP[place]ELOTOMY      In [place] right hydroneph[place]     Social History     Socioeconomic History    Marital status:      Spouse name: Not on file    Number of children: 2    Years of education: Not on file    Highest education level: Not on file   Occupational History    Occupation: RN currently housewife     Employer: not employed   Social Needs    Financial resource strain: Not on file    Food insecurity:     Worry: Not on file     Inability: Not on file   MyCheck needs:     Medical: Not on file     Non-medical: Not on file   Tobacco Use    Smoking status: Former Smoker     Last attempt to quit: 1987     Years since quittin.0    Smokeless tobacco: Never Used   Substance and Sexual Activity    Alcohol use:  Yes     Alcohol/week: 0.0 standard drinks     Comment: abuse-has not drank in four years    Drug use: No     Types: Marijuana     Comment: Former use a long time ago    Sexual activity: Not on file   Lifestyle    Physical activity:     Days per week: Not on file     Minutes per session: Not on file    Stress: Not on file   Relationships    Social connections:     Talks on phone: Not on file     Gets together: Not on file     Attends [employer] service: Not on file     Active member of club or organization: Not on file     Attends meetings of clubs or organizations: Not on file     Relationship status: Not on file    Intimate partner violence:     Fear of current or ex partner: Not on file     Emotionally abused: Not on file     Physically abused: Not on file     Forced sexual activity: Not on file   Other Topics Concern    Not on file   Social History Narrative    Not on file     Current Outpatient Medications   Medication Sig    DULoxetine (CYMBALTA) 30 mg capsule Take 30 mg by mouth daily.  DEXILANT 60 mg CpDB capsule (delayed release) TAKE 1 CAPSULE BY MOUTH DAILY    AIMOVIG AUTOINJECTOR 70 mg/mL injection 1 mL by SubCUTAneous route every thirty (30) days.  topiramate (TOPAMAX) 100 mg tablet Take 100 mg by mouth daily.  omeprazole (PRILOSEC OTC) 20 mg tablet Take 20 mg by mouth daily.  propranolol LA (INDERAL LA) 160 mg capsule TAKE 1 CAPSULE BY MOUTH DAILY    potassium chloride (K-DUR, KLOR-CON) 20 mEq tablet Take once daily as needed w furosemide    furosemide (LASIX) 20 mg tablet Take once daily as needed for edema    [facility]-me blue-sod phos-phsal-hyo (URIBEL) 118-10-40.8-36 mg cap capsule Take 1 Cap by mouth four (4) times daily. (Patient taking differently: Take 1 Cap by mouth four (4) times daily as needed.)    mometasone (NASONEX) 50 mcg/actuation nasal spray 2 Sprays by Both Nostrils route daily.  progesterone (PROMETRIUM) 200 mg capsule 400 mg.    rizatriptan (MAXALT) 10 mg tablet TAKE 1 TABLET BY MOUTH 1 TIME AS NEEDED FOR MIGRAINE. MAY REPEAT IN 2 HOURS AS NEEDED    MULTIVITAMIN (ONE-A-DAY ESSENTIAL PO) Take  by mouth.  Cetirizine 10 mg cap Take 10 mg by mouth daily.  acetaminophen (TYLENOL) 325 mg tablet Take  by mouth every four (4) hours as needed for Pain.  ibuprofen (MOTRIN) 200 mg tablet Take 600 mg by mouth every eight (8) hours as needed.     estradiol (MINIVELLE) 0.0375 mg/24 hr 1 Patch by TransDERmal route two (2) times a week on Wednesday and Saturday.  melatonin 3 mg tablet Take 3 mg by mouth nightly.  thyroid, Pork, (ARMOUR THYROID) 60 mg tablet Take 60 mg by mouth daily.  docusate sodium (COLACE) 100 mg capsule Take 1,600 mg by mouth two (2) times a day. 6 in the am and 10 at night    CALCIUM PO Take 500 mg by mouth two (2) times a day.  Cholecalciferol, Vitamin D3, (VITAMIN D) 2,000 unit Cap Take 1,000 Units by mouth daily.  TRINTELLIX 10 mg tablet Take 10 mg by mouth daily.  pregabalin (LYRICA) 75 mg capsule Take 1 tab TID by mouth    diclofenac (VOLTAREN) 1 % gel Apply 4 grams to right shoulder up to 4 times per day, maximum 16 grams per joint per day. Dispense 5 100 gram tubes    fremanezumab-vfrm (AJOVY) 225 mg/1.5 mL syrg by SubCUTAneous route every month.  carisoprodol (SOMA) 350 mg tablet Take 1 Tab by mouth every eight (8) hours as needed for Muscle Spasm(s). Max Daily Amount: 1,050 mg. (Patient not taking: Reported on 11/12/2019)    escitalopram oxalate (LEXAPRO) 20 mg tablet Take 20 mg by mouth daily. No current facility-administered medications for this visit. No Known Allergies    Visit Vitals  /88   Pulse 63   Temp 99.6 °F (37.6 °C) (Oral)   Resp 14   Ht 5' 5.5\" (1.664 m)   Wt 181 lb (82.1 kg)   SpO2 99%   BMI 29.66 kg/m²   A&O x3  Affect is appropriate. Mood stable  No apparent distress  Anicteric, no JVD, adenopathy or thyromegaly. No carotid bruits or radiated murmur  Lungs clear to auscultation, no wheezes or rales  Heart showed regular rate and rhythm. No murmur, rubs, gallops  Abdomen soft nontender, no hepatosplenomegaly or masses. Extremities without edema. Pulses 1-2+ symmetrically    Assessment and plan:  1. Unexplained weight loss. I urged her to talk to Dr. Magi Velásquez regarding the thyroid dosing. We will check CT abdomen, pelvis, lung. Mammogram is coming next month.   If she needs GI work-up, she would prefer to see a different gastroenterologist.  2.  Psychiatry. Follow-up Dr. Claudette Moselle  3. Thyroid. As above       Above conditions discussed at length and patient vocalized understanding.   All questions answered to patient satisfaction

## 2020-01-16 NOTE — PROGRESS NOTES
Leonid Polk presents today for   Chief Complaint   Patient presents with    Fatigue     has not been sleeping well. patient stated her sleep is all over the place.  Weight Loss     several months she has been having unexplained weight loss              Depression Screening:  3 most recent PHQ Screens 11/12/2019   PHQ Not Done -   Little interest or pleasure in doing things Not at all   Feeling down, depressed, irritable, or hopeless Not at all   Total Score PHQ 2 0       Learning Assessment:  Learning Assessment 3/17/2016   PRIMARY LEARNER Patient   HIGHEST LEVEL OF EDUCATION - PRIMARY LEARNER  -   BARRIERS PRIMARY LEARNER -   CO-LEARNER CAREGIVER -   PRIMARY LANGUAGE ENGLISH   LEARNER PREFERENCE PRIMARY OTHER (COMMENT)   ANSWERED BY patient   RELATIONSHIP SELF       Abuse Screening:  Abuse Screening Questionnaire 6/18/2019   Do you ever feel afraid of your partner? N   Are you in a relationship with someone who physically or mentally threatens you? N   Is it safe for you to go home? Y       Fall Risk  No flowsheet data found. Health Maintenance Due   Topic Date Due    Pneumococcal 0-64 years (1 of 1 - PPSV23) 10/08/1974    DTaP/Tdap/Td series (1 - Tdap) 10/08/1979    Shingrix Vaccine Age 50> (1 of 2) 10/08/2018       Coordination of Care:  1. Have you been to the ER, urgent care clinic since your last visit? Hospitalized since your last visit? no    2. Have you seen or consulted any other health care providers outside of the [address] [place] [address] since your last visit? Include any pap smears or colon screening.  no

## 2020-02-19 ENCOUNTER — TELEPHONE (OUTPATIENT)
Dept: INTERNAL MEDICINE CLINIC | Age: 50
End: 2020-02-19

## 2020-02-19 NOTE — TELEPHONE ENCOUNTER
Pt calling, says she had a ct and ultrasound of thyroid last month at [facility]. Never heard about results. Please call.

## 2020-02-20 NOTE — TELEPHONE ENCOUNTER
Result Impression       1. Mild subsegmental atelectasis or fibrosis dependent lower lobes and left upper lobe. 2. 6 mm nonobstructing right lower pole renal calculus. Right hydronephrosis and proximal ureterectasis of uncertain etiology given the lack of definitive ureteral calculus. 3. Intrauterine device properly positioned. Signed By: Thad Grajeda MD on 1/28/2020 9:23 AM   Result Narrative   EXAM: CT CHEST/ABD/PELVIS W/ CONTRAST    CLINICAL INDICATION/HISTORY: R63.4: Weight loss    > Additional: Unexplained weight loss. COMPARISON: None    TECHNIQUE: Helical CT imaging of the chest, abdomen, and pelvis was performed with intravenous contrast. Multiplanar reformats were generated. One or more dose reduction techniques were used on this CT: automated exposure control, adjustment of the mAs and/or kVp according to patient size, and iterative reconstruction techniques.  The specific techniques used on this CT exam have been documented in the patient's electronic medical record.  Digital Imaging and Communications in Medicine (DICOM) format image data are available to nonaffiliated external healthcare facilities or entities on a secure, media free, reciprocally searchable basis with patient authorization for at least a 12-month period after this study. ________________    FINDINGS:    CHEST:    LUNGS: No suspicious nodule or mass. Subsegmental atelectasis or fibrosis dependent lower lobes and dependent left upper lobe. AIRWAY: Normal.    PLEURA: Normal, with no effusion or pneumothorax. MEDIASTINUM: Normal heart size. No pericardial effusion. Vasculature is unremarkable. LYMPH NODES: No enlarged lymph nodes. OTHER: Bilateral augmentation mammoplasties. Small hiatal hernia.    _______________    ABDOMEN/PELVIS:    LIVER, BILIARY: Liver is normal. No biliary dilation. Gallbladder is unremarkable.     PANCREAS: Normal.    SPLEEN: Normal.    ADRENALS: Normal.    KIDNEYS/URETERS/BLADDER: 6 mm right lower pole renal calculus. There is dilatation of right renal collecting system and proximal right ureter but no evidence of ureteral calculus. PELVIC ORGANS: Intrauterine device properly positioned. LYMPH NODES: No enlarged lymph nodes. GASTROINTESTINAL TRACT: No bowel dilation or wall thickening. Normal appendix. VASCULATURE: Unremarkable. BONES: No acute or aggressive osseous abnormalities identified. OTHER: None.    _______________   Other Result Information   Interface, [employer] - 01/28/2020  9:25 AM EST  EXAM: CT CHEST/ABD/PELVIS W/ CONTRAST    CLINICAL INDICATION/HISTORY: R63.4: Weight loss    > Additional: Unexplained weight loss. COMPARISON: None    TECHNIQUE: Helical CT imaging of the chest, abdomen, and pelvis was performed with intravenous contrast. Multiplanar reformats were generated. One or more dose reduction techniques were used on this CT: automated exposure control, adjustment of the mAs and/or kVp according to patient size, and iterative reconstruction techniques. The specific techniques used on this CT exam have been documented in the patient's electronic medical record. Digital Imaging and Communications in Medicine (DICOM) format image data are available to nonaffiliated external healthcare facilities or entities on a secure, media free, reciprocally searchable basis with patient authorization for at least a 12-month period after this study. ________________    FINDINGS:    CHEST:    LUNGS: No suspicious nodule or mass. Subsegmental atelectasis or fibrosis dependent lower lobes and dependent left upper lobe. AIRWAY: Normal.    PLEURA: Normal, with no effusion or pneumothorax. MEDIASTINUM: Normal heart size. No pericardial effusion. Vasculature is unremarkable. LYMPH NODES: No enlarged lymph nodes. OTHER: Bilateral augmentation mammoplasties.  Small hiatal hernia.    _______________    ABDOMEN/PELVIS:    LIVER, BILIARY: Liver is normal. No biliary dilation. Gallbladder is unremarkable. PANCREAS: Normal.    SPLEEN: Normal.    ADRENALS: Normal.    KIDNEYS/URETERS/BLADDER: 6 mm right lower pole renal calculus. There is dilatation of right renal collecting system and proximal right ureter but no evidence of ureteral calculus. PELVIC ORGANS: Intrauterine device properly positioned. LYMPH NODES: No enlarged lymph nodes. GASTROINTESTINAL TRACT: No bowel dilation or wall thickening. Normal appendix. VASCULATURE: Unremarkable. BONES: No acute or aggressive osseous abnormalities identified. OTHER: None.    _______________    IMPRESSION      1. Mild subsegmental atelectasis or fibrosis dependent lower lobes and left upper lobe. 2. 6 mm nonobstructing right lower pole renal calculus. Right hydronephrosis and proximal ureterectasis of uncertain etiology given the lack of definitive ureteral calculus. 3. Intrauterine device properly positioned.     Signed By: Elba Mello MD on 1/28/2020 9:23 AM

## 2020-02-20 NOTE — TELEPHONE ENCOUNTER
Result Impression     No suspicious nodule requiring FNA or follow-up. Signed By: Nick Curran MD on 1/28/2020 1:56 PM   Result Narrative   EXAM: THYROID ULTRASOUND    CLINICAL INDICATION/HISTORY: E04.1: Nontoxic single thyroid nodule  -Additional:  None    COMPARISON: No previous thyroid ultrasound exams    TECHNIQUE: Real-time sonography in multiple planes of the thyroid was performed with image documentation. _______________    FINDINGS:    RIGHT LOBE: Normal echogenicity and vascularity. Size = 5.4 x 1.2 x 1.5 cm. No significant nodule requiring follow-up or FNA. LEFT LOBE: Normal echogenicity and vascularity. Size = 4.8 x 1.2 x 1.7 cm.   -Posterior mid lobe 0.4 cm hypoechoic nodule, which is not meet criteria for imaging follow-up or FNA. No significant nodule requiring follow-up or FNA. ISTHMUS: Normal.     OTHER: None.    _______________   Other Result Information   Interface, [employer] Rad Res - 01/28/2020  1:58 PM EST  EXAM: THYROID ULTRASOUND    CLINICAL INDICATION/HISTORY: E04.1: Nontoxic single thyroid nodule  -Additional:  None    COMPARISON: No previous thyroid ultrasound exams    TECHNIQUE: Real-time sonography in multiple planes of the thyroid was performed with image documentation. _______________    FINDINGS:    RIGHT LOBE: Normal echogenicity and vascularity. Size = 5.4 x 1.2 x 1.5 cm. No significant nodule requiring follow-up or FNA. LEFT LOBE: Normal echogenicity and vascularity. Size = 4.8 x 1.2 x 1.7 cm.   -Posterior mid lobe 0.4 cm hypoechoic nodule, which is not meet criteria for imaging follow-up or FNA. No significant nodule requiring follow-up or FNA. ISTHMUS: Normal.     OTHER: None.    _______________    IMPRESSION    No suspicious nodule requiring FNA or follow-up.     Signed By: Nick Curran MD on 1/28/2020 1:56 PM   Status

## 2020-02-20 NOTE — TELEPHONE ENCOUNTER
pls call pt    She hasn't been called because we have not gotten results    [facility] I [facility] see in [facility]    Also, the patient can log onto [facility] and see her results directly

## 2020-02-20 NOTE — TELEPHONE ENCOUNTER
pls call    US thyroid negative    CT chest neg  CT abd/pelvis shows RIGHT hydronephrosis mild - etiology unclear    No tumors anywhere  Unclear why she would have hydro - suggest urology evaluation - [facility] if she agrees

## 2020-06-15 DIAGNOSIS — Z00.00 PHYSICAL EXAM: Primary | ICD-10-CM

## 2020-12-02 ENCOUNTER — TELEPHONE (OUTPATIENT)
Dept: INTERNAL MEDICINE CLINIC | Age: 50
End: 2020-12-02

## 2020-12-02 NOTE — TELEPHONE ENCOUNTER
----- Message from Socrates Mccormick sent at 12/1/2020  3:57 PM EST -----  Patient requesting in office for cpe and blood work. ..... [phone]

## 2020-12-10 NOTE — PROGRESS NOTES
44 y.o. WHITE OR  female who presents for RPE    She was being followed by Dr. Tram Garcia and then switched to Dr Max Rossi but is sad as she is moving to the [place] and pt will have to find another provider. 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 No cardiovascular complaints    Vitals 12/15/2020 1/16/2020 11/12/2019 6/18/2019   Weight 180 lb 181 lb 186 lb 3.2 oz 207 lb     The GERD is controlled but she wants to change GI from Dr Ghassan Suarez, thinking about going back to Dr Tiffany Guerrero to see Dr Elizabeth Salas and no new developments    The botox helped  The migraines are controlled on regimen below    She is having a flare up of her back and interested in steroid pack    Past Medical History:   Diagnosis Date    Alcohol abuse     Bulimia     X20 years Dr. Tram Garcia; Meg 2011(?); now seeing Dr Johnathan Trinh Constipation     Degenerative disc disease, cervical 2009    C5-6 Dr Seun Lopez 2019    Dr Hamilton Aver Gastritis 12/12    GERD (gastroesophageal reflux disease) 2009    Elen Marcum, last EGD 12/12    H/O bone graft 05/22/2017    Dental bone graft for dental implant    Hydronephrosis of right kidney     Dr Dusty Vargas Hypothyroidism     Dr Alissa Wharton Interstitial cystitis     possible per gyn    Lumbar degenerative disc disease 01/2014    L4-5 mild degen disc w mild central stenosis    Migraine     Dr. Brandon Davis, saw Dr. Jacob Sun also; Dr Anthony Camejo the Dr Troy Mcgrath for botox; now aimovig    Obesity (BMI 30-39.9) 3/23/2018    HERMES (obstructive sleep apnea) 2011?     on cpap although she's not using Dr. Heather Leo, knee     Dr. Wagner Hazel Osteopenia 2/13 FRAX 3.0 and 0.3      DEXA t score 0.4 spine, -1.6 hip (2/13); -0.3 spine, -1.5 hip w FRAX 3.4/0.3 (4/15); w/u neg for secondary causes    PPD positive, treated     age 7    Thyroid nodule     Dr Adame Manasa; left 5mm ; no change , , ;  4mm [facility]    Venous insufficiency 2017     Past Surgical History:   Procedure Laterality Date    HX ABDOMINOPLASTY      and mastopexy, Dr. Stephanie Carrasco  [id number] Deanna Vila      Dr. Cathy Cortez Sruthi  negative    HX GI      EGD w gastritis, h pylori neg Dr. Marion Sruthi [facility]  [place] [facility]    IMPLANT BREAST [address]      [place] ?  NEUROLOGICAL PROCEDURE UNLISTED      EMG negative Dr. Benny Santa      MRI head negative    RENAL SCOPE,ENDOPYELOTOMY      In [place] right hydroneph[place]     Social History     Socioeconomic History    Marital status:      Spouse name: Not on file    Number of children: 2    Years of education: Not on file    Highest education level: Not on file   Occupational History    Occupation: RN currently housewife     Employer: not employed   Social Needs    Financial resource strain: Not on file    Food insecurity     Worry: Not on file     Inability: Not on file   [employer] needs     Medical: Not on file     Non-medical: Not on file   Tobacco Use    Smoking status: Former Smoker     Quit date: 1987     Years since quittin.9    Smokeless tobacco: Never Used   Substance and Sexual Activity    Alcohol use:  Yes     Alcohol/week: 0.0 standard drinks     Comment: abuse-has not drank in four years    Drug use: No     Types: Marijuana     Comment: Former use a long time ago    Sexual activity: Not on file   Lifestyle    Physical activity     Days per week: Not on file     Minutes per session: Not on file    Stress: Not on file   Relationships    Social connections     Talks on phone: Not on file     Gets together: Not on file     Attends Christian service: Not on file     Active member of club or organization: Not on file Attends meetings of clubs or organizations: Not on file     Relationship status: Not on file    Intimate partner violence     Fear of current or ex partner: Not on file     Emotionally abused: Not on file     Physically abused: Not on file     Forced sexual activity: Not on file   Other Topics Concern    Not on file   Social History Narrative    Not on file     Family History   Problem Relation Age of Onset    Diabetes Mother     Hypertension Mother     Alcohol abuse Mother     Liver Disease Mother     Depression Sister     Obesity Sister     Cancer Maternal Grandmother         lung    Heart Disease Maternal Grandmother      Immunization History   Administered Date(s) Administered    Hepatitis B Vaccine 01/01/1999    Influenza Vaccine 01/09/2013, 10/01/2015, 10/01/2016, 10/01/2017    Influenza Vaccine (Quad) PF (>6 Mo Flulaval, Fluarix, and >3 Yrs Afluria, Fluzone 89465) 10/08/2018, 10/30/2019, 08/13/2020    Influenza Vaccine PF 10/07/2014    Influenza Vaccine Whole 01/01/2009    Zoster Recombinant 10/27/2020     No Known Allergies  Current Outpatient Medications on File Prior to Visit   Medication Sig Dispense Refill    DEXILANT 60 mg CpDB capsule (delayed release) TAKE 1 CAPSULE BY MOUTH DAILY 90 Cap 3    AIMOVIG AUTOINJECTOR 70 mg/mL injection 1 mL by SubCUTAneous route every thirty (30) days.  omeprazole (PRILOSEC OTC) 20 mg tablet Take 20 mg by mouth daily.  propranolol LA (INDERAL LA) 160 mg capsule TAKE 1 CAPSULE BY MOUTH DAILY 90 Cap 2    potassium chloride (K-DUR, KLOR-CON) 20 mEq tablet Take once daily as needed w furosemide 30 Tab 6    furosemide (LASIX) 20 mg tablet Take once daily as needed for edema 30 Tab 6    [facility] blue-sod phos-phsal-hyo (URIBEL) 118-10-40.8-36 mg cap capsule Take 1 Cap by mouth four (4) times daily.  (Patient taking differently: Take 1 Cap by mouth four (4) times daily as needed.) 120 Cap 3    mometasone (NASONEX) 50 mcg/actuation nasal spray 2 Sprays by Both Nostrils route daily.  progesterone (PROMETRIUM) 200 mg capsule 400 mg.  3    rizatriptan (MAXALT) 10 mg tablet TAKE 1 TABLET BY MOUTH 1 TIME AS NEEDED FOR MIGRAINE. MAY REPEAT IN 2 HOURS AS NEEDED 12 Tab 5    MULTIVITAMIN (ONE-A-DAY ESSENTIAL PO) Take  by mouth.  Cetirizine 10 mg cap Take 10 mg by mouth daily.  acetaminophen (TYLENOL) 325 mg tablet Take  by mouth every four (4) hours as needed for Pain.  ibuprofen (MOTRIN) 200 mg tablet Take 600 mg by mouth every eight (8) hours as needed.  estradiol (MINIVELLE) 0.0375 mg/24 hr 1 Patch by TransDERmal route two (2) times a week on Wednesday and Saturday.  melatonin 3 mg tablet Take 3 mg by mouth nightly.  thyroid, Pork, (ARMOUR THYROID) 60 mg tablet Take 60 mg by mouth daily.  docusate sodium (COLACE) 100 mg capsule Take 1,600 mg by mouth two (2) times a day. 6 in the am and 10 at night      CALCIUM PO Take 500 mg by mouth two (2) times a day.  Cholecalciferol, Vitamin D3, (VITAMIN D) 2,000 unit Cap Take 1,000 Units by mouth daily. No current facility-administered medications on file prior to visit.       REVIEW OF SYSTEMS: gyn Dr. Ronald Fernández 1/19, mammo 1/19, colo 9/16 Dr nAa Maria Pacheco  no vision change or eye pain  Oral  no mouth pain, tongue or tooth problems  Ears  no hearing loss, ear pain, fullness  Throat  no swallowing problems  Cardiac  no CP, PND, orthopnea, edema, palpitations or syncope  Chest  no breast masses  Resp  no wheezing, chronic coughing, dyspnea  Urinary  no dysuria, hematuria, flank pain, urgency, frequencybowel habits, bleeding, hemorrhoids  Derm  no nail abnormalities, rashes, lesions of note, hair loss  Constitutional  no wt loss, night sweats, unexplained fevers  Neuro  no focal weakness, numbness, paresthesias, incoordination, ataxia, involuntary movements    Visit Vitals  /84   Pulse 63   Temp 97 °F (36.1 °C) (Temporal)   Resp 14   Ht 5' 5.5\" (1.664 m)   Wt 180 lb (81.6 kg)   SpO2 100%   BMI 29.50 kg/m²     Affect is appropriate. Mood stable  No apparent distress  HEENT --Anicteric sclerae, PERRL, EOMI, conjunctiva and lids normal.   Sinuses were nontender. Oropharynx without erythema, normal tongue, oral mucosa and tonsils. No thyromegaly, JVD, or bruits. Lungs --Clear to auscultation, normal percussion. Heart --Regular rate and rhythm, no murmurs, rubs, gallops, or clicks. Chest wall --Nontender to palpation. PMI normal.  Abdomen -- Soft and nontender, no hepatosplenomegaly or masses. Extremities -- Without cyanosis, clubbing. 2+ pulses equally and bilaterally.  1+ nonpitting edema above ankles bilat    LABS:  From 2/12 showed   gluc 80,   cr 0.60,    alt 29,          chol 156, tg 80,   hdl 59, ldl-c 89,      vit d 47.0  From 3/13 showed   gluc 78,   cr 0.88,     alt 24,          chol 153, tg 64,   hdl 62, ldl-c 78,   wbc 5.6, hb 13.3, plt 334, tsh 1.30,               ua neg  From 4/13 showed                                 vit d 54.1, spep neg, pth 19  From 3/14 showed   gluc 87,   cr 0.80, gfr>60, alt 25,          chol 154, tg 51,   hdl 92, ldl-c 85,   wbc 6.1, hb 13.3, plt 331  From 3/15 showed   gluc 102, cr 0.60, gfr>60, alt 10,          chol 161, tg 73,   hdl 55, ldl-c 92,   wbc 6.8, hb 13.4, plt 296, tsh 0.88, vit d 69.5  From 6/16 showed   gluc 79,   cr 0.70, gfr>60, alt 12,          chol 196, tg 93,   hdl 62, ldl-c 116, wbc 5.5, hb 12.5, plt 336, tsh 0.41  From 3/18 showed   gluc 94,   cr 0.60, gfr>60, alt 16,          chol 171, tg 97,   hdl 60, ldl-c 91,   wbc 5.9, hb 13.2, plt 304,           ua neg  From 5/18 showed                      vit d 60.5, damaris neg, ra neg, ccp neg, b12 425, fol 14.4, esr 2, crp 0.2, syphilis neg  From 6/19 showed   gluc 99,   cr 0.80, gfr>60, alt 12,          chol 185, tg 92,   hdl 55, ldl-c 112, wbc 6.0, hb 13.2, plt 314, tsh 0.23, vit d 60.5, ua neg,   ft4 1.10  From 5/20 showed        alt 11, hba1c 5.5, chol 180, tg 115, hdl 56, ldl-c 101,             tsh 1.58,           ft4 0.90,   From 12/20 showed gluc 108, cr 0.70, gfr>60, alt 11,          chol 189, tg 122, hdl 60, ldl-c 105, wbc 6.6, hb 12.7, plt 348, tsh 1.65,        ua neg,  ft4 1.00    We reviewed the patient's labs from the last several visits to point out trends in the numbers        Patient Active Problem List   Diagnosis Code    Bulimia Dr. Elise Cleaves Dr. Manuel Amezquita G43.909    DJD knees Dr. Clotilde Dias M19.90    Osteopenia 2/13 FRAX 3.0 and 0.3  M85.80    GERD without esophagitis K21.9    Bilateral leg edema R60.0    Asymptomatic varicose veins I83.90    Thyroid nodule E04.1    Obesity (BMI 30-39. 9) E66.9     ASSESSMENT AND PLAN:  1. Psychiatric. She will try to establish with the next psychiatrist  2. Migraines. Continue current and f/u Dr. Adrienne Borden  3. Thyroid nodule. Observation   4. GERD and constipation. F/U Dr Clotilde Dias  5. Osteopenia. On ca/d, encouraged wt bearing exercises  6. Edema. Prn diuretic  7. Endocrine/gyn. F/U Dr. Warren Drew  8. Spine. Medrol dose pack; she requests carafate with it as steroid bothers her stomach      RTC 12/21    Above conditions discussed at length and patient vocalized understanding.   All questions answered to patient satisfaction

## 2020-12-15 ENCOUNTER — OFFICE VISIT (OUTPATIENT)
Dept: INTERNAL MEDICINE CLINIC | Age: 50
End: 2020-12-15
Payer: COMMERCIAL

## 2020-12-15 VITALS
WEIGHT: 180 LBS | TEMPERATURE: 97 F | BODY MASS INDEX: 28.93 KG/M2 | SYSTOLIC BLOOD PRESSURE: 136 MMHG | DIASTOLIC BLOOD PRESSURE: 84 MMHG | HEART RATE: 63 BPM | OXYGEN SATURATION: 100 % | HEIGHT: 66 IN | RESPIRATION RATE: 14 BRPM

## 2020-12-15 DIAGNOSIS — E04.1 THYROID NODULE: ICD-10-CM

## 2020-12-15 DIAGNOSIS — F50.2 BULIMIA: ICD-10-CM

## 2020-12-15 DIAGNOSIS — G43.009 MIGRAINE WITHOUT AURA AND WITHOUT STATUS MIGRAINOSUS, NOT INTRACTABLE: ICD-10-CM

## 2020-12-15 DIAGNOSIS — E66.9 OBESITY (BMI 30-39.9): ICD-10-CM

## 2020-12-15 DIAGNOSIS — Z00.00 PHYSICAL EXAM: Primary | ICD-10-CM

## 2020-12-15 DIAGNOSIS — K21.9 GERD WITHOUT ESOPHAGITIS: ICD-10-CM

## 2020-12-15 DIAGNOSIS — M54.9 MID BACK PAIN: ICD-10-CM

## 2020-12-15 PROCEDURE — 99396 PREV VISIT EST AGE 40-64: CPT | Performed by: INTERNAL MEDICINE

## 2020-12-16 ENCOUNTER — TELEPHONE (OUTPATIENT)
Dept: INTERNAL MEDICINE CLINIC | Age: 50
End: 2020-12-16

## 2020-12-16 RX ORDER — METHYLPREDNISOLONE 4 MG/1
TABLET ORAL
Qty: 1 DOSE PACK | Refills: 0 | Status: SHIPPED | OUTPATIENT
Start: 2020-12-16 | End: 2021-02-02 | Stop reason: ALTCHOICE

## 2020-12-16 RX ORDER — DULOXETIN HYDROCHLORIDE 30 MG/1
30 CAPSULE, DELAYED RELEASE ORAL 3 TIMES DAILY
Qty: 90 CAP | Refills: 11 | Status: SHIPPED | OUTPATIENT
Start: 2020-12-16 | End: 2021-06-02

## 2020-12-16 RX ORDER — SUCRALFATE 1 G/1
1 TABLET ORAL 4 TIMES DAILY
Qty: 28 TAB | Refills: 0 | Status: SHIPPED | OUTPATIENT
Start: 2020-12-16

## 2020-12-16 NOTE — TELEPHONE ENCOUNTER
Pt states she gave RD wrong dosage of Cymbalta she is taking---she actually takes 90 mg daily (2 tabs in morning, 1 in the evening 30 mg each tab). Also said her scripts were not sent to her pharmacy for Dexilant, Medrol pack and Carafate.   Any questions, call her at [phone]

## 2020-12-16 NOTE — TELEPHONE ENCOUNTER
Called pt to make her aware meds were sent to pharmacy    She stated she was just updating you on her cymbalta mg and how many she takes per day    She does not need you to precribe it, her psych provider prescribes this for her

## 2020-12-18 DIAGNOSIS — K21.9 GERD WITHOUT ESOPHAGITIS: ICD-10-CM

## 2020-12-18 NOTE — TELEPHONE ENCOUNTER
Last Visit: 12/15/20 with MD Venita Gonzalez  Next Appointment: 12/20/21 with MD Venita Gonzalez  Previous Refill Encounter(s): 12/16/19 #90 with 3 refills    Requested Prescriptions     Pending Prescriptions Disp Refills    dexlansoprazole (Dexilant) 60 mg CpDB capsule (delayed release) 90 Cap 3     Sig: Take 1 Cap by mouth daily.

## 2020-12-19 RX ORDER — DEXLANSOPRAZOLE 60 MG/1
1 CAPSULE, DELAYED RELEASE ORAL DAILY
Qty: 90 CAP | Refills: 3 | Status: SHIPPED | OUTPATIENT
Start: 2020-12-19 | End: 2020-12-20

## 2020-12-20 DIAGNOSIS — K21.9 GERD WITHOUT ESOPHAGITIS: ICD-10-CM

## 2020-12-20 RX ORDER — DEXLANSOPRAZOLE 60 MG/1
CAPSULE, DELAYED RELEASE ORAL
Qty: 90 CAP | Refills: 3 | Status: SHIPPED | OUTPATIENT
Start: 2020-12-20

## 2021-02-02 ENCOUNTER — OFFICE VISIT (OUTPATIENT)
Dept: INTERNAL MEDICINE CLINIC | Age: 51
End: 2021-02-02
Payer: COMMERCIAL

## 2021-02-02 VITALS
BODY MASS INDEX: 29.41 KG/M2 | RESPIRATION RATE: 14 BRPM | SYSTOLIC BLOOD PRESSURE: 127 MMHG | TEMPERATURE: 97 F | HEIGHT: 66 IN | WEIGHT: 183 LBS | HEART RATE: 77 BPM | OXYGEN SATURATION: 100 % | DIASTOLIC BLOOD PRESSURE: 83 MMHG

## 2021-02-02 DIAGNOSIS — I10 PRIMARY HYPERTENSION: Primary | ICD-10-CM

## 2021-02-02 PROCEDURE — 99213 OFFICE O/P EST LOW 20 MIN: CPT | Performed by: INTERNAL MEDICINE

## 2021-02-02 RX ORDER — DEXTROAMPHETAMINE SACCHARATE, AMPHETAMINE ASPARTATE, DEXTROAMPHETAMINE SULFATE AND AMPHETAMINE SULFATE 2.5; 2.5; 2.5; 2.5 MG/1; MG/1; MG/1; MG/1
TABLET ORAL
COMMUNITY
Start: 2021-01-05 | End: 2021-06-02

## 2021-02-02 RX ORDER — LOSARTAN POTASSIUM 25 MG/1
25 TABLET ORAL DAILY
Qty: 30 TAB | Refills: 5 | Status: SHIPPED | OUTPATIENT
Start: 2021-02-02 | End: 2021-06-02 | Stop reason: SDUPTHER

## 2021-02-02 RX ORDER — FLUTICASONE FUROATE 27.5 UG/1
1 SPRAY, METERED NASAL DAILY
COMMUNITY

## 2021-02-02 NOTE — PROGRESS NOTES
Jimenez Monaco presents today for   Chief Complaint   Patient presents with    Blood Pressure Check     pt stated she started back taking adderall and has made BP go up              Depression Screening:  3 most recent PHQ Screens 2/2/2021   PHQ Not Done -   Little interest or pleasure in doing things Not at all   Feeling down, depressed, irritable, or hopeless Not at all   Total Score PHQ 2 0       Learning Assessment:  Learning Assessment 3/17/2016   PRIMARY LEARNER Patient   HIGHEST LEVEL OF EDUCATION - PRIMARY LEARNER  -   BARRIERS PRIMARY LEARNER -   CO-LEARNER CAREGIVER -   PRIMARY LANGUAGE ENGLISH   LEARNER PREFERENCE PRIMARY OTHER (COMMENT)   ANSWERED BY patient   RELATIONSHIP SELF       Abuse Screening:  Abuse Screening Questionnaire 6/18/2019   Do you ever feel afraid of your partner? N   Are you in a relationship with someone who physically or mentally threatens you? N   Is it safe for you to go home? Y       Fall Risk  No flowsheet data found. Coordination of Care:  1. Have you been to the ER, urgent care clinic since your last visit? Hospitalized since your last visit? no    2. Have you seen or consulted any other health care providers outside of the [address] [place] [address] since your last visit? Include any pap smears or colon screening.  no

## 2021-02-02 NOTE — PROGRESS NOTES
44 y.o. WHITE OR  female who presents for evaluation. She is here asking about adding a blood pressure pill on. No prior diagnosis of hypertension but she has been on high-dose Inderal for years for migraine prophylaxis. She was started on Adderall by Dr. 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 The Vyvanse was discontinued because it was elevating the blood pressure. Unfortunately, the Adderall is as well. No chest pain, shortness of breath, PND, orthopnea, edema, palpitations, syncope, headaches, focal neurologic complaints. She does have family history of hypertension    Past Medical History:   Diagnosis Date    Alcohol abuse     Bulimia     X20 years Dr. Court Wu; Bhat-Bautista 2011(?); now seeing Dr Mayank Brewster Constipation     Degenerative disc disease, cervical 2009    C5-6 Dr Madalyn Rosa 2019    Dr Sophia Padron Gastritis 12/12    GERD (gastroesophageal reflux disease) 2009    Riley Hart, last EGD 12/12    H/O bone graft 05/22/2017    Dental bone graft for dental implant    Hydronephrosis of right kidney     Dr Carmelita Mcdowlel Hypertension     Hypothyroidism     Dr J Luis Paul Interstitial cystitis     possible per gyn    Lumbar degenerative disc disease 01/2014    L4-5 mild degen disc w mild central stenosis    Migraine     Dr. John Gomez, saw Dr. Jamee Sweeney also; Dr Blessing Vela the Dr Veronica Hagen for botox; now aimovig    Obesity (BMI 30-39.9) 3/23/2018    HERMES (obstructive sleep apnea) 2011?     on cpap although she's not using Dr. Sullivan Fell, knee     Dr. Hammer Adjutant Osteopenia 2/13 FRAX 3.0 and 0.3      DEXA t score 0.4 spine, -1.6 hip (2/13); -0.3 spine, -1.5 hip w FRAX 3.4/0.3 (4/15); w/u neg for secondary causes    PPD positive, treated     age 6    Thyroid nodule     Dr Jessica Dooley; left 5mm 2014; no change 2015, 2016, 8/17; 1/20 4mm [facility]    Venous insufficiency 07/2017     Current Outpatient Medications   Medication Sig    dextroamphetamine-amphetamine (ADDERALL) 10 mg tablet TAKE 1 TABLET BY MOUTH TWICE DAILY IN THE MORNING AND EARLY AFTERNOON    fluticasone (Flonase Sensimist) 27.5 mcg/actuation nasal spray 1 [place] by Both Nostrils route daily.  Dexilant 60 mg CpDB capsule (delayed release) TAKE 1 CAPSULE BY MOUTH DAILY    sucralfate (Carafate) 1 gram tablet Take 1 Tab by mouth four (4) times daily.  DULoxetine (CYMBALTA) 30 mg capsule Take 1 Cap by mouth three (3) times daily. (Patient taking differently: Take 90 mg by mouth. 60 in morning and 30 in the evening)    AIMOVIG AUTOINJECTOR 70 mg/mL injection 1 mL by SubCUTAneous route every thirty (30) days.  omeprazole (PRILOSEC OTC) 20 mg tablet Take 20 mg by mouth daily.  propranolol LA (INDERAL LA) 160 mg capsule TAKE 1 CAPSULE BY MOUTH DAILY    potassium chloride (K-DUR, KLOR-CON) 20 mEq tablet Take once daily as needed w furosemide    furosemide (LASIX) 20 mg tablet Take once daily as needed for edema    [facility] blue-sod phos-phsal-hyo (URIBEL) 118-10-40.8-36 mg cap capsule Take 1 Cap by mouth four (4) times daily. (Patient taking differently: Take 1 Cap by mouth four (4) times daily as needed.)    progesterone (PROMETRIUM) 200 mg capsule 400 mg.    rizatriptan (MAXALT) 10 mg tablet TAKE 1 TABLET BY MOUTH 1 TIME AS NEEDED FOR MIGRAINE. MAY REPEAT IN 2 HOURS AS NEEDED    MULTIVITAMIN (ONE-A-DAY ESSENTIAL PO) Take  by mouth.  Cetirizine 10 mg cap Take 10 mg by mouth daily.  acetaminophen (TYLENOL) 325 mg tablet Take  by mouth every four (4) hours as needed for Pain.  ibuprofen (MOTRIN) 200 mg tablet Take 600 mg by mouth every eight (8) hours as needed.  estradiol (MINIVELLE) 0.0375 mg/24 hr 1 Patch by TransDERmal route two (2) times a week on Wednesday and Saturday.  melatonin 3 mg tablet Take 3 mg by mouth nightly.  thyroid, Pork, (ARMOUR THYROID) 60 mg tablet Take 60 mg by mouth daily.     docusate sodium (COLACE) 100 mg capsule Take 1,600 mg by mouth two (2) times a day. 6 in the am and 10 at night    CALCIUM PO Take 500 mg by mouth two (2) times a day.  Cholecalciferol, Vitamin D3, (VITAMIN D) 2,000 unit Cap Take 1,000 Units by mouth daily. No current facility-administered medications for this visit. No Known Allergies    Visit Vitals  /83   Pulse 77   Temp 97 °F (36.1 °C) (Temporal)   Resp 14   Ht 5' 5.5\" (1.664 m)   Wt 183 lb (83 kg)   SpO2 100%   BMI 29.99 kg/m²   A&O x3  Affect is appropriate. Mood stable  No apparent distress  Anicteric, no JVD, adenopathy or thyromegaly. No carotid bruits or radiated murmur  Lungs clear to auscultation, no wheezes or rales  Heart showed regular rate and rhythm. No murmur, rubs, gallops  Abdomen soft nontender, no hepatosplenomegaly or masses. Extremities without edema. Pulses 1-2+ symmetrically    Assessment and plan:  1. Hypertension. Quick discussion on various options, settled on losartan. Side effects and benefits reviewed, she will come back in 3 months with blood work. Start 25 mg and call in readings we can make adjustments as needed        Above conditions discussed at length and patient vocalized understanding.   All questions answered to patient satisfaction

## 2021-05-09 NOTE — PROGRESS NOTES
44 y.o. WHITE female who presents for f/u    Last encounter February, she had asked to be placed on blood pressure pills as the stimulants were increasing her blood pressure. We added losartan, no side effects reported, she is getting diastolics still in the low 80 range. Otherwise doing well. Denies any cardiovascular complaints. Past Medical History:   Diagnosis Date    Alcohol abuse     Bulimia     X20 years Dr. Ann Oreilly; Meg 2011(?); now seeing Dr Jozef Haskins Constipation     Degenerative disc disease, cervical 2009    C5-6 Dr Alysa Campuzano 2019    Dr Arcelia Martinez Gastritis 12/12    GERD (gastroesophageal reflux disease) 2009    Marifer Chan, last EGD 12/12    H/O bone graft 05/22/2017    Dental bone graft for dental implant    Hydronephrosis of right kidney     Dr Goodwin Fat Hypertension     Hypothyroidism     Dr Paula Rouse Interstitial cystitis     possible per gyn    Lumbar degenerative disc disease 01/2014    L4-5 mild degen disc w mild central stenosis    Migraine     Dr. Bari Wright, saw Dr. Kavin Fountain also; Dr Larson Even the Dr Viviana Gonzales for botox; now [employer]    Obesity (BMI 30-39.9) 3/23/2018    HERMES (obstructive sleep apnea) 2011?     on cpap although she's not using Dr. Nik Gunn, knee     Dr. Timmons Sensing Osteopenia 2/13 FRAX 3.0 and 0.3      DEXA t score 0.4 spine, -1.6 hip (2/13); -0.3 spine, -1.5 hip w FRAX 3.4/0.3 (4/15); w/u neg for secondary causes    PPD positive, treated 1980    Thyroid nodule     Dr Eli Quintero; left 5mm 2014; no change 2015, 2016, 8/17; 1/20 4mm [facility]    Venous insufficiency 07/2017     Past Surgical History:   Procedure Laterality Date    HX ABDOMINOPLASTY  2004    and mastopexy, Dr. Marcelina Carrel  9961    silicone Dr David Munoz  8/12    Dr. Anastasia Boyd 9/16 negative    HX GI  12/12    EGD w gastritis, h pylori neg Dr. Randy Cain HX GYN  [id number]    C SECTION X 2    IMPLANT BREAST SILICONE/EQ      Saline ?  NEUROLOGICAL PROCEDURE UNLISTED      EMG negative Dr. Erin Montanez      MRI head negative    [place] RENAL SCOPE[place]OP[place]OT[place]      [place]     Social History     Socioeconomic History    Marital status:      Spouse name: Not on file    Number of children: 2    Years of education: Not on file    Highest education level: Not on file   Occupational History    Occupation: RN currently housewife     Employer: not employed   Social Needs    Financial resource strain: Not on file    Food insecurity     Worry: Not on file     Inability: Not on file   [employer] needs     Medical: Not on file     Non-medical: Not on file   Tobacco Use    Smoking status: Former Smoker     Quit date: 1987     Years since quittin.3    Smokeless tobacco: Never Used   Substance and Sexual Activity    Alcohol use:  Yes     Alcohol/week: 0.0 standard drinks     Comment: abuse-has not drank in four years    Drug use: No     Types: Marijuana     Comment: Former use a long time ago    Sexual activity: Not on file   Lifestyle    Physical activity     Days per week: Not on file     Minutes per session: Not on file    Stress: Not on file   Relationships    Social connections     Talks on phone: Not on file     Gets together: Not on file     Attends [employer] service: Not on file     Active member of club or organization: Not on file     Attends meetings of clubs or organizations: Not on file     Relationship status: Not on file    Intimate partner violence     Fear of current or ex partner: Not on file     Emotionally abused: Not on file     Physically abused: Not on file     Forced sexual activity: Not on file   Other Topics Concern    Not on file   Social History Narrative    Not on file     No Known Allergies     Current Outpatient Medications on File Prior to Visit   Medication Sig Dispense Refill    busPIRone (BUSPAR) 15 mg tablet TAKE 1 TABLET BY MOUTH TWICE DAILY      atomoxetine (Strattera) 40 mg capsule Take 40 mg by mouth daily.  fluticasone (Flonase Sensimist) 27.5 mcg/actuation nasal spray 1 [place] by Both Nostrils route daily.  losartan (COZAAR) 25 mg tablet Take 1 Tab by mouth daily. 30 Tab 5    Dexilant 60 mg CpDB capsule (delayed release) TAKE 1 CAPSULE BY MOUTH DAILY 90 Cap 3    sucralfate (Carafate) 1 gram tablet Take 1 Tab by mouth four (4) times daily. 28 Tab 0    DULoxetine (CYMBALTA) 30 mg capsule Take 1 Cap by mouth three (3) times daily. (Patient taking differently: Take 30 mg by mouth. 60 in morning and 60 in the evening) 90 Cap 11    AIMOVIG AUTOINJECTOR 70 mg/mL injection 1 mL by SubCUTAneous route every thirty (30) days.  omeprazole (PRILOSEC OTC) 20 mg tablet Take 20 mg by mouth daily.  propranolol LA (INDERAL LA) 160 mg capsule TAKE 1 CAPSULE BY MOUTH DAILY 90 Cap 2    potassium chloride (K-DUR, KLOR-CON) 20 mEq tablet Take once daily as needed w furosemide 30 Tab 6    furosemide (LASIX) 20 mg tablet Take once daily as needed for edema 30 Tab 6    mth-me blue-sod phos-phsal-hyo (URIBEL) 118-10-40.8-36 mg cap capsule Take 1 Cap by mouth four (4) times daily. (Patient taking differently: Take 1 Cap by mouth four (4) times daily as needed.) 120 Cap 3    progesterone (PROMETRIUM) 200 mg capsule 400 mg.  3    rizatriptan (MAXALT) 10 mg tablet TAKE 1 TABLET BY MOUTH 1 TIME AS NEEDED FOR MIGRAINE. MAY REPEAT IN 2 HOURS AS NEEDED 12 Tab 5    MULTIVITAMIN (ONE-A-DAY ESSENTIAL PO) Take  by mouth.  Cetirizine 10 mg cap Take 10 mg by mouth daily.  acetaminophen (TYLENOL) 325 mg tablet Take  by mouth every four (4) hours as needed for Pain.  ibuprofen (MOTRIN) 200 mg tablet Take 600 mg by mouth every eight (8) hours as needed.  estradiol (MINIVELLE) 0.0375 mg/24 hr 1 Patch by TransDERmal route two (2) times a week on Wednesday and Saturday.       melatonin 3 mg tablet Take 3 mg by mouth nightly.  thyroid, Pork, (ARMOUR THYROID) 60 mg tablet Take 60 mg by mouth daily.  docusate sodium (COLACE) 100 mg capsule Take 1,600 mg by mouth two (2) times a day. 6 in the am and 10 at night      CALCIUM PO Take 500 mg by mouth two (2) times a day.  Cholecalciferol, Vitamin D3, (VITAMIN D) 2,000 unit Cap Take 1,000 Units by mouth daily.  dextroamphetamine-amphetamine (ADDERALL) 10 mg tablet TAKE 1 TABLET BY MOUTH TWICE DAILY IN THE MORNING AND EARLY AFTERNOON       No current facility-administered medications on file prior to visit. REVIEW OF SYSTEMS: gyn Dr. Jerrilyn Hashimoto 1/19, mammo 1/19, colo 9/16 Dr Joselyn Avila  no vision change or eye pain  Oral  no mouth pain, tongue or tooth problems  Ears  no hearing loss, ear pain, fullness  Throat  no swallowing problems  Cardiac  no CP, PND, orthopnea, edema, palpitations or syncope  Chest  no breast masses  Resp  no wheezing, chronic coughing, dyspnea  Urinary  no dysuria, hematuria, flank pain, urgency, frequencybowel habits, bleeding, hemorrhoids    Visit Vitals  /70 (BP 1 Location: Left arm, BP Patient Position: Sitting)   Pulse 65   Temp 97.9 °F (36.6 °C) (Temporal)   Resp 16   Ht 5' 5.5\" (1.664 m)   Wt 181 lb (82.1 kg)   SpO2 98%   BMI 29.66 kg/m²   A&O x3  Affect is appropriate. Mood stable  No apparent distress  Anicteric, no JVD, adenopathy or thyromegaly. No carotid bruits or radiated murmur  Lungs clear to auscultation, no wheezes or rales  Heart showed regular rate and rhythm. No murmur, rubs, gallops  Abdomen soft nontender, no hepatosplenomegaly or masses. Extremities without edema.   Pulses 1-2+ symmetrically    LABS:  From 2/12 showed   gluc 80,   cr 0.60,    alt 29,          chol 156, tg 80,   hdl 59, ldl-c 89,      vit d 47.0  From 3/13 showed   gluc 78,   cr 0.88,     alt 24,          chol 153, tg 64,   hdl 62, ldl-c 78,   wbc 5.6, hb 13.3, plt 334, tsh 1.30, ua neg  From 4/13 showed                                 vit d 54.1, spep neg, pth 19  From 3/14 showed   gluc 87,   cr 0.80, gfr>60, alt 25,          chol 154, tg 51,   hdl 92, ldl-c 85,   wbc 6.1, hb 13.3, plt 331  From 3/15 showed   gluc 102, cr 0.60, gfr>60, alt 10,          chol 161, tg 73,   hdl 55, ldl-c 92,   wbc 6.8, hb 13.4, plt 296, tsh 0.88, vit d 69.5  From 6/16 showed   gluc 79,   cr 0.70, gfr>60, alt 12,          chol 196, tg 93,   hdl 62, ldl-c 116, wbc 5.5, hb 12.5, plt 336, tsh 0.41  From 3/18 showed   gluc 94,   cr 0.60, gfr>60, alt 16,          chol 171, tg 97,   hdl 60, ldl-c 91,   wbc 5.9, hb 13.2, plt 304,           ua neg  From 5/18 showed                      vit d 60.5, damaris neg, ra neg, ccp neg, b12 425, fol 14.4, esr 2, crp 0.2, syphilis neg  From 6/19 showed   gluc 99,   cr 0.80, gfr>60, alt 12,          chol 185, tg 92,   hdl 55, ldl-c 112, wbc 6.0, hb 13.2, plt 314, tsh 0.23, vit d 60.5, ua neg,   ft4 1.10  From 5/20 showed        alt 11, hba1c 5.5, chol 180, tg 115, hdl 56, ldl-c 101,             tsh 1.58,           ft4 0.90,   From 12/20 showed gluc 108, cr 0.70, gfr>60, alt 11,          chol 189, tg 122, hdl 60, ldl-c 105, wbc 6.6, hb 12.7, plt 348, tsh 1.65,        ua neg,  ft4 1.00    Results for orders placed or performed during the hospital encounter of 14/64/40   METABOLIC PANEL, BASIC   Result Value Ref Range    Sodium 141 136 - 145 mmol/L    Potassium 4.8 3.5 - 5.5 mmol/L    Chloride 107 100 - 111 mmol/L    CO2 29 21 - 32 mmol/L    Anion gap 5 3.0 - 18 mmol/L    Glucose 97 74 - 99 mg/dL    BUN 17 7.0 - 18 MG/DL    Creatinine 0.75 0.6 - 1.3 MG/DL    BUN/Creatinine ratio 23 (H) 12 - 20      GFR est AA >60 >60 ml/min/1.73m2    GFR est non-AA >60 >60 ml/min/1.73m2    Calcium 9.1 8.5 - 10.1 MG/DL     We reviewed the patient's labs from the last several visits to point out trends in the numbers        Patient Active Problem List   Diagnosis Code    Bulimia Dr. Tona White F50.2    Migraines Dr. Mavis Hardwick G43.909    DJD knees Dr. Solorzano M19.90    Osteopenia 2/13 FRAX 3.0 and 0.3  M85.80    GERD without esophagitis K21.9    Bilateral leg edema R60.0    Asymptomatic varicose veins I83.90    Thyroid nodule E04.1    Obesity (BMI 30-39. 9) E66.9    Primary hypertension I10     ASSESSMENT AND PLAN:  1. Psychiatric. F/u psych  2. Migraines. Continue current and f/u Dr. Claudeen Bergamo  3. Thyroid nodule. Observation   4. GERD and constipation. F/U Dr Solorzano  5. Osteopenia. On ca/d, encouraged wt bearing exercises  6. Edema. Prn diuretic  7. Endocrine/gyn. F/U Dr. Estrella Wheeler  8. HTN. Continue losartan and she will work on lifestyle changes      RTC 9/21    Above conditions discussed at length and patient vocalized understanding.   All questions answered to patient satisfaction

## 2021-05-10 ENCOUNTER — APPOINTMENT (OUTPATIENT)
Dept: INTERNAL MEDICINE CLINIC | Age: 51
End: 2021-05-10

## 2021-05-10 ENCOUNTER — HOSPITAL ENCOUNTER (OUTPATIENT)
Dept: LAB | Age: 51
Discharge: HOME | End: 2021-05-10
Payer: COMMERCIAL

## 2021-05-10 DIAGNOSIS — I10 PRIMARY HYPERTENSION: ICD-10-CM

## 2021-05-10 LAB
ANION GAP SERPL CALC-SCNC: 5 MMOL/L (ref 3–18)
BUN SERPL-MCNC: 17 MG/DL (ref 7–18)
BUN/CREAT SERPL: 23 (ref 12–20)
CALCIUM SERPL-MCNC: 9.1 MG/DL (ref 8.5–10.1)
CHLORIDE SERPL-SCNC: 107 MMOL/L (ref 100–111)
CO2 SERPL-SCNC: 29 MMOL/L (ref 21–32)
CREAT SERPL-MCNC: 0.75 MG/DL (ref 0.6–1.3)
GLUCOSE SERPL-MCNC: 97 MG/DL (ref 74–99)
POTASSIUM SERPL-SCNC: 4.8 MMOL/L (ref 3.5–5.5)
SODIUM SERPL-SCNC: 141 MMOL/L (ref 136–145)

## 2021-05-10 PROCEDURE — 80048 BASIC METABOLIC PNL TOTAL CA: CPT

## 2021-05-17 ENCOUNTER — OFFICE VISIT (OUTPATIENT)
Dept: INTERNAL MEDICINE CLINIC | Age: 51
End: 2021-05-17
Payer: COMMERCIAL

## 2021-05-17 VITALS
BODY MASS INDEX: 29.09 KG/M2 | HEART RATE: 65 BPM | DIASTOLIC BLOOD PRESSURE: 70 MMHG | WEIGHT: 181 LBS | HEIGHT: 66 IN | SYSTOLIC BLOOD PRESSURE: 118 MMHG | RESPIRATION RATE: 16 BRPM | OXYGEN SATURATION: 98 % | TEMPERATURE: 97.9 F

## 2021-05-17 DIAGNOSIS — E04.1 THYROID NODULE: ICD-10-CM

## 2021-05-17 DIAGNOSIS — I10 PRIMARY HYPERTENSION: ICD-10-CM

## 2021-05-17 DIAGNOSIS — R60.0 BILATERAL LEG EDEMA: Primary | ICD-10-CM

## 2021-05-17 DIAGNOSIS — G43.009 MIGRAINE WITHOUT AURA AND WITHOUT STATUS MIGRAINOSUS, NOT INTRACTABLE: ICD-10-CM

## 2021-05-17 PROCEDURE — 99214 OFFICE O/P EST MOD 30 MIN: CPT | Performed by: INTERNAL MEDICINE

## 2021-05-17 RX ORDER — ATOMOXETINE 40 MG/1
40 CAPSULE ORAL DAILY
COMMUNITY
End: 2021-06-02

## 2021-05-17 RX ORDER — BUSPIRONE HYDROCHLORIDE 15 MG/1
TABLET ORAL
COMMUNITY
Start: 2021-05-11 | End: 2021-12-02

## 2021-05-17 NOTE — PROGRESS NOTES
1. Have you been to the ER, urgent care clinic or hospitalized since your last visit? NO.     2. Have you seen or consulted any other health care providers outside of the [address] [place] [address] since your last visit (Include any pap smears or colon screening)?  NO 74

## 2021-05-26 ENCOUNTER — TELEPHONE (OUTPATIENT)
Dept: INTERNAL MEDICINE CLINIC | Age: 51
End: 2021-05-26

## 2021-05-26 NOTE — TELEPHONE ENCOUNTER
Please return call to pt for triage. She has been experiencing heart fluttering on and off the last several days. Stated she takes hormone replacement medications. She feels it could be medication related. Stated her  is a doctor and he notice some athymia when checking her pulse while she was having symptoms of fluttering. Stated it feels like \"skips a beat\"    She wanted an appt for this Friday 05/28/2021 and I scheduled her for 11AM. Please cancel or advise if not appropriate.

## 2021-05-28 ENCOUNTER — HOSPITAL ENCOUNTER (OUTPATIENT)
Dept: LAB | Age: 51
Discharge: HOME | End: 2021-05-28
Payer: COMMERCIAL

## 2021-05-28 ENCOUNTER — OFFICE VISIT (OUTPATIENT)
Dept: INTERNAL MEDICINE CLINIC | Age: 51
End: 2021-05-28
Payer: COMMERCIAL

## 2021-05-28 VITALS
BODY MASS INDEX: 29.09 KG/M2 | OXYGEN SATURATION: 96 % | HEART RATE: 56 BPM | SYSTOLIC BLOOD PRESSURE: 130 MMHG | DIASTOLIC BLOOD PRESSURE: 86 MMHG | WEIGHT: 181 LBS | HEIGHT: 66 IN | TEMPERATURE: 97.7 F | RESPIRATION RATE: 16 BRPM

## 2021-05-28 DIAGNOSIS — R00.2 PALPITATIONS: ICD-10-CM

## 2021-05-28 DIAGNOSIS — R94.31 ABNORMAL EKG: ICD-10-CM

## 2021-05-28 DIAGNOSIS — I49.8 PERIODIC HEART FLUTTER: Primary | ICD-10-CM

## 2021-05-28 DIAGNOSIS — I10 PRIMARY HYPERTENSION: ICD-10-CM

## 2021-05-28 PROBLEM — E03.9 ACQUIRED HYPOTHYROIDISM: Status: ACTIVE | Noted: 2021-05-28

## 2021-05-28 LAB
ANION GAP SERPL CALC-SCNC: 4 MMOL/L (ref 3–18)
BUN SERPL-MCNC: 13 MG/DL (ref 7–18)
BUN/CREAT SERPL: 16 (ref 12–20)
CALCIUM SERPL-MCNC: 9.9 MG/DL (ref 8.5–10.1)
CHLORIDE SERPL-SCNC: 106 MMOL/L (ref 100–111)
CO2 SERPL-SCNC: 30 MMOL/L (ref 21–32)
CREAT SERPL-MCNC: 0.79 MG/DL (ref 0.6–1.3)
ERYTHROCYTE [DISTWIDTH] IN BLOOD BY AUTOMATED COUNT: 13.5 % (ref 11.6–14.5)
GLUCOSE SERPL-MCNC: 94 MG/DL (ref 74–99)
HCT VFR BLD AUTO: 36.9 % (ref 35–45)
HGB BLD-MCNC: 11.8 G/DL (ref 12–16)
MAGNESIUM SERPL-MCNC: 1.8 MG/DL (ref 1.6–2.6)
MCH RBC QN AUTO: 29.9 PG (ref 24–34)
MCHC RBC AUTO-ENTMCNC: 32 G/DL (ref 31–37)
MCV RBC AUTO: 93.4 FL (ref 74–97)
PLATELET # BLD AUTO: 432 K/UL (ref 135–420)
PMV BLD AUTO: 9.3 FL (ref 9.2–11.8)
POTASSIUM SERPL-SCNC: 4.6 MMOL/L (ref 3.5–5.5)
RBC # BLD AUTO: 3.95 M/UL (ref 4.2–5.3)
SODIUM SERPL-SCNC: 140 MMOL/L (ref 136–145)
T4 FREE SERPL-MCNC: 0.8 NG/DL (ref 0.7–1.5)
TSH SERPL DL<=0.05 MIU/L-ACNC: 2.67 UIU/ML (ref 0.36–3.74)
WBC # BLD AUTO: 6.5 K/UL (ref 4.6–13.2)

## 2021-05-28 PROCEDURE — 85027 COMPLETE CBC AUTOMATED: CPT

## 2021-05-28 PROCEDURE — 83735 ASSAY OF MAGNESIUM: CPT

## 2021-05-28 PROCEDURE — 80048 BASIC METABOLIC PNL TOTAL CA: CPT

## 2021-05-28 PROCEDURE — 99214 OFFICE O/P EST MOD 30 MIN: CPT | Performed by: INTERNAL MEDICINE

## 2021-05-28 PROCEDURE — 84443 ASSAY THYROID STIM HORMONE: CPT

## 2021-05-28 PROCEDURE — 84439 ASSAY OF FREE THYROXINE: CPT

## 2021-05-28 PROCEDURE — 93000 ELECTROCARDIOGRAM COMPLETE: CPT | Performed by: INTERNAL MEDICINE

## 2021-05-28 RX ORDER — ASCORBIC ACID 250 MG
1600 TABLET ORAL DAILY
COMMUNITY

## 2021-05-28 RX ORDER — ZINC GLUCONATE 10 MG
120 LOZENGE ORAL
COMMUNITY
End: 2021-12-02

## 2021-05-28 RX ORDER — CIDER VINEGAR 300 MG
1600 TABLET ORAL
COMMUNITY
End: 2021-12-02

## 2021-05-28 NOTE — PROGRESS NOTES
Chief Complaint   Patient presents with    Irregular Heart Beat     every once in a great while- had a couple days of a fews days having flutters    Medication Evaluation    Hypertension     states BP has been an issue     1. Have you been to the ER, urgent care clinic since your last visit? Hospitalized since your last visit? No    2. Have you seen or consulted any other health care providers outside of the [address] [place] [address] since your last visit? Include any pap smears or colon screening.  No

## 2021-05-28 NOTE — PROGRESS NOTES
44 y.o. WHITE female who presents for evaluation. The last several months, she has been having intermittent palpitations that last a few minutes at most usually several weeks apart. This week, she had an episode that lasted about an hour and then another episode 3 or so days later. Her , who is a surgeon, felt her pulse and felt that it was more of an early beat. No chest pain but it does \"take her breath away\" when it happens. She tries to be active, no set exercise. No pnd, orthopnea, edema, syncope, presyncope. She has not been having any exertional symptoms. She is trying to decide whether to come off the thyroid replacement, currently under management by Dr. Heri Deras and she has an appointment in late June. Her Cymbalta was recently increased from 90 to 120 mg. She remains on ADD meds. No other stimulants that she consumes. She does admit to a lot of dyspepsia and reflux, takes Dexilant under the management of GI. Lastly, she is on beta-blocker for migraine prophylaxis; they had a hard time getting it refilled so she was on an older prescription at a lower dose and she is not sure if that had anything to do with her symptoms above    Past Medical History:   Diagnosis Date    Alcohol abuse     Bulimia     X20 years Dr. Derrick Caraballo;   Meg 2011(?); now seeing Dr Maximilian Lee Constipation     Degenerative disc disease, cervical 2009    C5-6 Dr Peace Bell 2019    Dr Lin Estimable Gastritis 12/12    GERD (gastroesophageal reflux disease) 2009    Kota Freeman, last EGD 12/12    H/O bone graft 05/22/2017    Dental bone graft for dental implant    Hydronephrosis of right kidney     Dr Florian Bonilla Hypertension     Hypothyroidism     Dr Obed Schulz Interstitial cystitis     possible per gyn    Lumbar degenerative disc disease 01/2014    L4-5 mild degen disc w mild central stenosis    Migraine     Dr. Bayron Rojas, saw Dr. Ewelina Sharma also; Dr Yadi Sosa the Dr Rosario Mckeon for botox; now [facility]  Obesity (BMI 30-39.9) 3/23/2018    HERMES (obstructive sleep apnea) ? on cpap although she's not using Dr. Tiffanie Abbasi, knee     Dr. Florencia Arreaga Osteopenia  FRAX 3.0 and 0.3      DEXA t score 0.4 spine, -1.6 hip (); -0.3 spine, -1.5 hip w FRAX 3.4/0.3 (4/15); w/u neg for secondary causes    PPD positive, treated 1980    Thyroid nodule     Dr Luis Peterson; left 5mm ; no change , , ;  4mm [facility]    Venous insufficiency 2017     Past Surgical History:   Procedure Laterality Date    HX ABDOMINOPLASTY      and mastopexy, Dr. Bijan Leon  [address]    silicone Dr Darek Wu      Dr. Mayra Schultz Dr Oral Loss  negative    HX GI      EGD w gastritis, h pylori neg Dr. Duenas Glass X 2    IMPLANT BREAST [address]      Saline ?  NEUROLOGICAL PROCEDURE UNLISTED      EMG negative Dr. Bakari Terrazas      MRI head negative    [place] RE[place]OPE[place]      [place]     Social History     Socioeconomic History    Marital status:      Spouse name: Not on file    Number of children: 2    Years of education: Not on file    Highest education level: Not on file   Occupational History    Occupation: RN currently housewife     Employer: not employed   Tobacco Use    Smoking status: Former Smoker     Quit date: 1987     Years since quittin.4    Smokeless tobacco: Never Used   Substance and Sexual Activity    Alcohol use:  Yes     Alcohol/week: 0.0 standard drinks     Comment: abuse-has not drank in four years    Drug use: No     Types: Marijuana     Comment: Former use a long time ago    Sexual activity: Not on file   Other Topics Concern    Not on file   Social History Narrative    Not on file     Social Determinants of Health     Financial Resource Strain:     Difficulty of Paying Living Expenses:    Food Insecurity:     Worried About Running Out of Food in the Last Year:     [address] in the Last Year:    Transportation Needs:     Lack of Transportation (Medical):  Lack of Transportation (Non-Medical):    Physical Activity:     Days of Exercise per Week:     Minutes of Exercise per Session:    Stress:     Feeling of Stress :    Social Connections:     Frequency of Communication with Friends and Family:     Frequency of Social Gatherings with Friends and Family:     Attends [employer] Services:     Active Member of Clubs or Organizations:     Attends Club or Organization Meetings:     Marital Status:    Intimate Partner Violence:     Fear of Current or Ex-Partner:     Emotionally Abused:     Physically Abused:     Sexually Abused:      Current Outpatient Medications   Medication Sig    ascorbic acid, vitamin C, (Vitamin C) 250 mg tablet Take 1,600 mg by mouth daily.  Apple Cider Vinegar 300 mg tab Take 1,600 mg by mouth.  COLLAGEN by Does Not Apply route.  magnesium 250 mg tab Take 120 mg by mouth.  busPIRone (BUSPAR) 15 mg tablet TAKE 1 TABLET BY MOUTH TWICE DAILY    fluticasone (Flonase Sensimist) 27.5 mcg/actuation nasal spray 1 [place] by Both Nostrils route daily.  losartan (COZAAR) 25 mg tablet Take 1 Tab by mouth daily.  Dexilant 60 mg CpDB capsule (delayed release) TAKE 1 CAPSULE BY MOUTH DAILY    sucralfate (Carafate) 1 gram tablet Take 1 Tab by mouth four (4) times daily.  AIMOVIG AUTOINJECTOR 70 mg/mL injection 1 mL by SubCUTAneous route every thirty (30) days.  omeprazole (PRILOSEC OTC) 20 mg tablet Take 20 mg by mouth daily.     propranolol LA (INDERAL LA) 160 mg capsule TAKE 1 CAPSULE BY MOUTH DAILY    potassium chloride (K-DUR, KLOR-CON) 20 mEq tablet Take once daily as needed w furosemide    furosemide (LASIX) 20 mg tablet Take once daily as needed for edema    progesterone (PROMETRIUM) 200 mg capsule 400 mg.    rizatriptan (MAXALT) 10 mg tablet TAKE 1 TABLET BY MOUTH 1 TIME AS NEEDED FOR MIGRAINE. MAY REPEAT IN 2 HOURS AS NEEDED    Cetirizine 10 mg cap Take 10 mg by mouth daily.  acetaminophen (TYLENOL) 325 mg tablet Take  by mouth every four (4) hours as needed for Pain.  ibuprofen (MOTRIN) 200 mg tablet Take 600 mg by mouth every eight (8) hours as needed.  estradiol (MINIVELLE) 0.0375 mg/24 hr 1 Patch by TransDERmal route two (2) times a week on Wednesday and Saturday.  melatonin 3 mg tablet Take 5 mg by mouth nightly.  thyroid, Pork, (ARMOUR THYROID) 60 mg tablet Take 60 mg by mouth daily.  docusate sodium (COLACE) 100 mg capsule Take 1,600 mg by mouth two (2) times a day. 6 in the am and 10 at night    CALCIUM PO Take 500 mg by mouth two (2) times a day.  Cholecalciferol, Vitamin D3, (VITAMIN D) 2,000 unit Cap Take 1,000 Units by mouth daily.  atomoxetine (Strattera) 40 mg capsule Take 40 mg by mouth daily. (Patient not taking: Reported on 5/28/2021)    dextroamphetamine-amphetamine (ADDERALL) 10 mg tablet TAKE 1 TABLET BY MOUTH TWICE DAILY IN THE MORNING AND EARLY AFTERNOON (Patient not taking: Reported on 5/28/2021)    DULoxetine (CYMBALTA) 30 mg capsule Take 1 Cap by mouth three (3) times daily. (Patient not taking: Reported on 5/28/2021)    [facility]-me blue-sod phos-phsal-hyo (URIBEL) 118-10-40.8-36 mg cap capsule Take 1 Cap by mouth four (4) times daily. (Patient not taking: Reported on 5/28/2021)    MULTIVITAMIN (ONE-A-DAY ESSENTIAL PO) Take  by mouth. (Patient not taking: Reported on 5/28/2021)     No current facility-administered medications for this visit. No Known Allergies    Visit Vitals  /86 (BP 1 Location: Left arm, BP Patient Position: Sitting, BP Cuff Size: Adult)   Pulse (!) 56   Temp 97.7 °F (36.5 °C) (Oral)   Resp 16   Ht 5' 5.5\" (1.664 m)   Wt 181 lb (82.1 kg)   SpO2 96%   BMI 29.66 kg/m²   A&O x3  Affect is appropriate.   Mood stable  No apparent distress  Anicteric, no JVD, adenopathy or thyromegaly. No carotid bruits or radiated murmur  Lungs clear to auscultation, no wheezes or rales  Heart showed regular rate and rhythm. No murmur, rubs, gallops  Abdomen soft nontender, no hepatosplenomegaly or masses. Extremities with 1+ edema. Pulses 1-2+ symmetrically    EKG today as read by me showed sinus bradycardia rate of 55, inferior Q waves with associated T wave inversion, borderline R wave progression. No old EKG for comparison    Assessment and plan:  1. Palpitations, sounds like PACs or PVCs on description. With abnormal EKG as above, I have elected to send to cardiology. Check labs, defer on echo, monitoring, etc. to cardiology. 2.  Abnormal EKG. Cardiology consult  3. Hypothyroidism. Check TSH and free T4, she will talk to Dr. Demarco Coombs  4. Psychiatry. She is now managed by the PA at [facility] psychiatry. 5.  Migraines. Continue current          Above conditions discussed at length and patient vocalized understanding.   All questions answered to patient satisfaction

## 2021-06-02 ENCOUNTER — OFFICE VISIT (OUTPATIENT)
Dept: CARDIOLOGY CLINIC | Age: 51
End: 2021-06-02
Payer: COMMERCIAL

## 2021-06-02 VITALS
OXYGEN SATURATION: 100 % | WEIGHT: 185.6 LBS | DIASTOLIC BLOOD PRESSURE: 82 MMHG | TEMPERATURE: 98.1 F | SYSTOLIC BLOOD PRESSURE: 145 MMHG | HEIGHT: 66 IN | BODY MASS INDEX: 29.83 KG/M2 | HEART RATE: 59 BPM

## 2021-06-02 DIAGNOSIS — E03.9 ACQUIRED HYPOTHYROIDISM: ICD-10-CM

## 2021-06-02 DIAGNOSIS — R00.2 PALPITATION: Primary | ICD-10-CM

## 2021-06-02 DIAGNOSIS — G43.909 MIGRAINE WITHOUT STATUS MIGRAINOSUS, NOT INTRACTABLE, UNSPECIFIED MIGRAINE TYPE: ICD-10-CM

## 2021-06-02 DIAGNOSIS — R94.31 ABNORMAL EKG: ICD-10-CM

## 2021-06-02 DIAGNOSIS — I10 ESSENTIAL HYPERTENSION: ICD-10-CM

## 2021-06-02 DIAGNOSIS — I10 PRIMARY HYPERTENSION: ICD-10-CM

## 2021-06-02 DIAGNOSIS — Z82.49 FAMILY HISTORY OF CORONARY ARTERY DISEASE: ICD-10-CM

## 2021-06-02 PROCEDURE — 99204 OFFICE O/P NEW MOD 45 MIN: CPT | Performed by: INTERNAL MEDICINE

## 2021-06-02 PROCEDURE — 93000 ELECTROCARDIOGRAM COMPLETE: CPT | Performed by: INTERNAL MEDICINE

## 2021-06-02 RX ORDER — LOSARTAN POTASSIUM 50 MG/1
50 TABLET ORAL DAILY
Qty: 90 TABLET | Refills: 3 | Status: SHIPPED | OUTPATIENT
Start: 2021-06-02 | End: 2022-08-01

## 2021-06-02 NOTE — PROGRESS NOTES
HISTORY OF PRESENT ILLNESS  Zak Bran is a 44 y.o. female. 6/2/2021  Patient seen today for new patient evaluation. She is referred here for evaluation of palpitation and abnormal EKG. patient had episode of skipped beat after starting new medication. Particularly for thyroid. She was planning episode every week. Since then she has stopped her thyroid medication and symptoms have resolved. Abnormal EKG was reported in PCP office. She denies any chest pain or shortness of breath. She has edema related to venous reflux. She has history of hypertension      Review of Systems   Constitutional: Negative for chills and fever. HENT: Negative for nosebleeds. Eyes: Negative for blurred vision and double vision. Respiratory: Negative for cough, hemoptysis, sputum production, shortness of breath and wheezing. Cardiovascular: Positive for palpitations and leg swelling. Negative for chest pain, orthopnea, claudication and PND. Gastrointestinal: Negative for abdominal pain, heartburn, nausea and vomiting. Musculoskeletal: Negative for myalgias. Skin: Negative for rash. Neurological: Negative for dizziness, weakness and headaches. Endo/Heme/Allergies: Does not bruise/bleed easily. Family History   Problem Relation Age of Onset    Diabetes Mother     Hypertension Mother     Alcohol abuse Mother     Liver Disease Mother     Depression Sister     Obesity Sister     Cancer Maternal Grandmother         lung    Heart Disease Maternal Grandmother        Past Medical History:   Diagnosis Date    Alcohol abuse     Bulimia     X20 years Dr. Inez Anderson;   Meg 2011(?); now seeing Dr Mireya Miguel Constipation     Degenerative disc disease, cervical 2009    C5-6 Dr Isela Sexton 2019    Dr Molly Suero Gastritis 12/12    GERD (gastroesophageal reflux disease) 2009    Tao Michelle, last EGD 12/12    H/O bone graft 05/22/2017    Dental bone graft for dental implant    Hydronephrosis of right kidney     Dr Camila Fernandez Hypertension     Hypothyroidism     Dr Maury Miller Interstitial cystitis     possible per gyn    Lumbar degenerative disc disease 2014    L4-5 mild degen disc w mild central stenosis    Migraine     Dr. Susan Uribe, saw Dr. Laura Everett also; Dr Vannessa Louis the Dr Marlin Wilkerson for botox; now aimovig    Obesity (BMI 30-39.9) 3/23/2018    HERMES (obstructive sleep apnea) ? on cpap although she's not using Dr. Higinio Maldonado, knee     Dr. Asif Blanco Osteopenia  FRAX 3.0 and 0.3      DEXA t score 0.4 spine, -1.6 hip (); -0.3 spine, -1.5 hip w FRAX 3.4/0.3 (4/15); w/u neg for secondary causes    PPD positive, treated     Thyroid nodule     Dr Ben Mejia; left 5mm ; no change , , ;  4mm [facility]    Venous insufficiency 2017       Past Surgical History:   Procedure Laterality Date    HX ABDOMINOPLASTY      and mastopexy, Dr. Truong Schwartz  [address]    [address] Dr Yobani Cherry      Dr. Jaimie Reeves  negative    HX GI      EGD w gastritis, h pylori neg Dr. Sruthi Flannery X 2    IMPLANT BREAST  [facility]      Saline ?  NEUROLOGICAL PROCEDURE UNLISTED      EMG negative Dr. Kaya Baumann      MRI head negative    [place] RENAL SCOPE,ENDOPYELOTOMY      In [place] right hydronephrosis       Social History     Tobacco Use    Smoking status: Former Smoker     Quit date: 1987     Years since quittin.4    Smokeless tobacco: Never Used   Substance Use Topics    Alcohol use: Not Currently     Comment: abuse-has not drank in four years       No Known Allergies    Prior to Admission medications    Medication Sig Start Date End Date Taking? Authorizing Provider   losartan (COZAAR) 50 mg tablet Take 1 Tablet by mouth daily.  21  Yes Adrian Hill MD   ascorbic acid, vitamin C, (Vitamin C) 250 mg tablet Take 1,600 mg by mouth daily. Yes Provider, Historical   Apple Cider Vinegar 300 mg tab Take 1,600 mg by mouth. Yes Provider, Historical   COLLAGEN by Does Not Apply route. Yes Provider, Historical   magnesium 250 mg tab Take 120 mg by mouth. Yes Provider, Historical   busPIRone (BUSPAR) 15 mg tablet TAKE 1 TABLET BY MOUTH TWICE DAILY 5/11/21  Yes Provider, Historical   fluticasone (Flonase Sensimist) 27.5 mcg/actuation nasal spray 1 [place] by Both Nostrils route daily. Yes Provider, Historical   Dexilant 60 mg CpDB capsule (delayed release) TAKE 1 CAPSULE BY MOUTH DAILY 12/20/20  Yes Kiah Gannon MD   sucralfate (Carafate) 1 gram tablet Take 1 Tab by mouth four (4) times daily. 12/16/20  Yes Kiah Gannon MD   AIMOVIG AUTOINJECTOR 70 mg/mL injection 1 mL by SubCUTAneous route every thirty (30) days. 11/6/19  Yes Provider, Historical   omeprazole (PRILOSEC OTC) 20 mg tablet Take 20 mg by mouth daily. Yes Provider, Historical   propranolol LA (INDERAL LA) 160 mg capsule TAKE 1 CAPSULE BY MOUTH DAILY 10/29/19  Yes Lidya Ware MD   potassium chloride (K-DUR, KLOR-CON) 20 mEq tablet Take once daily as needed w furosemide 6/18/19  Yes Kiah Gannon MD   furosemide (LASIX) 20 mg tablet Take once daily as needed for edema 6/18/19  Yes Kiah Gannon MD   progesterone (PROMETRIUM) 200 mg capsule 400 mg. 1/12/19  Yes Provider, Historical   rizatriptan (MAXALT) 10 mg tablet TAKE 1 TABLET BY MOUTH 1 TIME AS NEEDED FOR MIGRAINE. MAY REPEAT IN 2 HOURS AS NEEDED 6/1/18  Yes Lidya Ware MD   Cetirizine 10 mg cap Take 10 mg by mouth daily. Yes Provider, Historical   acetaminophen (TYLENOL) 325 mg tablet Take  by mouth every four (4) hours as needed for Pain. Yes Provider, Historical   ibuprofen (MOTRIN) 200 mg tablet Take 600 mg by mouth every eight (8) hours as needed.    Yes Provider, Historical   estradiol (MINIVELLE) 0.0375 mg/24 hr 1 Patch by TransDERmal route two (2) times a week on Wednesday and Saturday. Yes Provider, Historical   melatonin 3 mg tablet Take 5 mg by mouth nightly. Yes Provider, Historical   docusate sodium (COLACE) 100 mg capsule Take 1,600 mg by mouth two (2) times a day. 6 in the am and 10 at night   Yes Provider, Historical   CALCIUM PO Take 500 mg by mouth two (2) times a day. Yes Provider, Historical   Cholecalciferol, Vitamin D3, (VITAMIN D) 2,000 unit Cap Take 1,000 Units by mouth daily. Yes Provider, Historical         Visit Vitals  BP (!) 145/82   Pulse (!) 59   Temp 98.1 °F (36.7 °C) (Temporal)   Ht 5' 5.5\" (1.664 m)   Wt 84.2 kg (185 lb 9.6 oz)   SpO2 100%   BMI 30.42 kg/m²          Physical Exam  Constitutional:       Appearance: She is well-developed. HENT:      Head: Normocephalic and atraumatic. Eyes:      Conjunctiva/sclera: Conjunctivae normal.   Neck:      Thyroid: No thyromegaly. Vascular: No JVD. Trachea: No tracheal deviation. Cardiovascular:      Rate and Rhythm: Normal rate and regular rhythm. Chest Wall: PMI is not displaced. Pulses: No decreased pulses. Heart sounds: No murmur heard. No gallop. No S3 sounds. Pulmonary:      Effort: No respiratory distress. Breath sounds: No wheezing or rales. Chest:      Chest wall: No tenderness. Abdominal:      Palpations: Abdomen is soft. Tenderness: There is no abdominal tenderness. Musculoskeletal:      Cervical back: Neck supple. Skin:     General: Skin is warm. Neurological:      Mental Status: She is alert and oriented to person, place, and time. Ms. Shayan Colon has a reminder for a \"due or due soon\" health maintenance. I have asked that she contact her primary care provider for follow-up on this health maintenance. No flowsheet data found. I have personally reviewed patient's records available from hospital and other providers and incorporated findings in patient care.   Notes, lab, EKG, CT scan  I have personally reviewed patients ekg done at other facility. Sinus bradycardialimb lead reversal6/2021    Assessment         ICD-10-CM ICD-9-CM    1. Palpitation  R00.2 785.1     Premature beat reported on exam.  Possible PAC or PVC. Symptoms are improved after stopping thyroid medication   2. Abnormal EKG  R94.31 794.31 AMB POC EKG ROUTINE W/ 12 LEADS, INTER & REP      CT HEART W/O CONT WITH CALCIUM    Initial EKG appears to have limb lead reversal.  Has sinus bradycardia. EKG today shows sinus bradycardia otherwise within normal limit   3. Essential hypertension  I10 401.9 CT HEART W/O CONT WITH CALCIUM    Mildly elevated. Currently on low-dose of losartan which I have increased to 50 mg a day   4. Acquired hypothyroidism  E03.9 244.9     Currently on treatment. TSH is normal on recent lab continue treatment   5. Migraine without status migrainosus, not intractable, unspecified migraine type  G43.909 346.90     Currently on propranolol. Sinus bradycardia related to that   6. Family history of coronary artery disease  Z82.49 V17.3 CT HEART W/O CONT WITH CALCIUM    Early CAD in multiple family member. Will check coronary CTA   7. Primary hypertension  I10 401.9 losartan (COZAAR) 50 mg tablet   6/2021  Seen for palpitation likely related to thyroid medication. No recurrence of symptoms after stopping the medicine. Currently on beta-blocker for migraine. Sinus bradycardia on EKG otherwise within normal limits today. Premature atherosclerosis in family will get coronary CTA and decide on treatment with statin or aspirin.   Continue monitoring clinically    Medications Discontinued During This Encounter   Medication Reason    atomoxetine (Strattera) 40 mg capsule Not A Current Medication    dextroamphetamine-amphetamine (ADDERALL) 10 mg tablet Not A Current Medication    DULoxetine (CYMBALTA) 30 mg capsule Not A Current Medication    [facility] blue-sod phos-phsal-hyo (URIBEL) 118-10-40.8-36 mg cap capsule Not A Current Medication  MULTIVITAMIN (ONE-A-DAY ESSENTIAL PO) Not A Current Medication    thyroid, Pork, (ARMOUR THYROID) 60 mg tablet Not A Current Medication    losartan (COZAAR) 25 mg tablet REORDER       Orders Placed This Encounter    CT HEART W/O CONT WITH CALCIUM     Standing Status:   Future     Standing Expiration Date:   7/2/2022    AMB POC EKG ROUTINE W/ 12 LEADS, INTER & REP     Order Specific Question:   Reason for Exam:     Answer:   Abnormal EKG    losartan (COZAAR) 50 mg tablet     Sig: Take 1 Tablet by mouth daily. Dispense:  90 Tablet     Refill:  3       Follow-up and Dispositions    · Return in about 3 months (around 9/2/2021).

## 2021-06-08 ENCOUNTER — TELEPHONE (OUTPATIENT)
Dept: INTERNAL MEDICINE CLINIC | Age: 51
End: 2021-06-08

## 2021-06-08 DIAGNOSIS — D64.9 ANEMIA, UNSPECIFIED TYPE: Primary | ICD-10-CM

## 2021-06-08 NOTE — TELEPHONE ENCOUNTER
Patient notified about lab result, per Dr. Luisa Urrutia. Lab appt made for 3 month follow up. Please order expected labs to be rechecked.

## 2021-06-08 NOTE — TELEPHONE ENCOUNTER
pls call      Results for orders placed or performed during the hospital encounter of 05/28/21   CBC W/O DIFF   Result Value Ref Range    WBC 6.5 4.6 - 13.2 K/uL    RBC 3.95 (L) 4.20 - 5.30 M/uL    HGB 11.8 (L) 12.0 - 16.0 g/dL    HCT 36.9 35.0 - 45.0 %    MCV 93.4 74.0 - 97.0 FL    MCH 29.9 24.0 - 34.0 PG    MCHC 32.0 31.0 - 37.0 g/dL    RDW 13.5 11.6 - 14.5 %    PLATELET 612 (H) 782 - 420 K/uL    MPV 9.3 9.2 - 23.6 FL   METABOLIC PANEL, BASIC   Result Value Ref Range    Sodium 140 136 - 145 mmol/L    Potassium 4.6 3.5 - 5.5 mmol/L    Chloride 106 100 - 111 mmol/L    CO2 30 21 - 32 mmol/L    Anion gap 4 3.0 - 18 mmol/L    Glucose 94 74 - 99 mg/dL    BUN 13 7.0 - 18 MG/DL    Creatinine 0.79 0.6 - 1.3 MG/DL    BUN/Creatinine ratio 16 12 - 20      GFR est AA >60 >60 ml/min/1.73m2    GFR est non-AA >60 >60 ml/min/1.73m2    Calcium 9.9 8.5 - 10.1 MG/DL   MAGNESIUM   Result Value Ref Range    Magnesium 1.8 1.6 - 2.6 mg/dL   TSH 3RD GENERATION   Result Value Ref Range    TSH 2.67 0.36 - 3.74 uIU/mL   T4, FREE   Result Value Ref Range    T4, Free 0.8 0.7 - 1.5 NG/DL     Cbc ok outside borderline anemia - would recheck in a few months  cmp ok  Thyroid ok

## 2021-06-25 ENCOUNTER — APPOINTMENT (OUTPATIENT)
Dept: CT IMAGING | Age: 51
End: 2021-06-25
Attending: INTERNAL MEDICINE

## 2021-09-13 ENCOUNTER — TELEPHONE (OUTPATIENT)
Dept: INTERNAL MEDICINE CLINIC | Age: 51
End: 2021-09-13

## 2021-09-13 DIAGNOSIS — R30.0 DYSURIA: Primary | ICD-10-CM

## 2021-09-13 NOTE — TELEPHONE ENCOUNTER
Azo test positive    Pt is having burning, smell and pain in kidney    Thinks she has a uti. No open appt. Can you place orders so she can come in and do a urine or do you want to offer her an appointment time?

## 2021-09-14 ENCOUNTER — APPOINTMENT (OUTPATIENT)
Dept: INTERNAL MEDICINE CLINIC | Age: 51
End: 2021-09-14

## 2021-09-17 RX ORDER — CEPHALEXIN 250 MG/1
CAPSULE ORAL
Qty: 30 CAPSULE | Refills: 0 | Status: SHIPPED | OUTPATIENT
Start: 2021-09-17 | End: 2021-12-02

## 2021-09-17 NOTE — TELEPHONE ENCOUNTER
Let her know I sent new orders that are in the system and she can do a urinalysis and culture at [facility] or through any [address] [place] [address] system within the next few days. I sent Keflex to her pharmacy, she can start it and leave specimens if she is not better in a day or 2, or she can wait to do the urine and urine culture, wants those have been given, she can start the Keflex.

## 2021-09-17 NOTE — TELEPHONE ENCOUNTER
Lab could not locate a specimen for patient, patient called to inform that we needed a new specimen, she stated she could not come in today, but could go to [facility] lab tomorrow. Patient stated that her symptoms were improving.

## 2021-09-20 ENCOUNTER — HOSPITAL ENCOUNTER (OUTPATIENT)
Dept: LAB | Age: 51
Discharge: HOME | End: 2021-09-20
Payer: COMMERCIAL

## 2021-09-20 ENCOUNTER — APPOINTMENT (OUTPATIENT)
Dept: INTERNAL MEDICINE CLINIC | Age: 51
End: 2021-09-20

## 2021-09-20 ENCOUNTER — TELEPHONE (OUTPATIENT)
Dept: INTERNAL MEDICINE CLINIC | Age: 51
End: 2021-09-20

## 2021-09-20 DIAGNOSIS — D64.9 ANEMIA, UNSPECIFIED TYPE: ICD-10-CM

## 2021-09-20 LAB
BASOPHILS # BLD: 0.1 K/UL (ref 0–0.1)
BASOPHILS NFR BLD: 1 % (ref 0–2)
DIFFERENTIAL METHOD BLD: ABNORMAL
EOSINOPHIL # BLD: 0.1 K/UL (ref 0–0.4)
EOSINOPHIL NFR BLD: 2 % (ref 0–5)
ERYTHROCYTE [DISTWIDTH] IN BLOOD BY AUTOMATED COUNT: 15.5 % (ref 11.6–14.5)
HCT VFR BLD AUTO: 33.3 % (ref 35–45)
HGB BLD-MCNC: 10.6 G/DL (ref 12–16)
LYMPHOCYTES # BLD: 1.9 K/UL (ref 0.9–3.6)
LYMPHOCYTES NFR BLD: 40 % (ref 21–52)
MCH RBC QN AUTO: 28.3 PG (ref 24–34)
MCHC RBC AUTO-ENTMCNC: 31.8 G/DL (ref 31–37)
MCV RBC AUTO: 89 FL (ref 78–100)
MONOCYTES # BLD: 0.5 K/UL (ref 0.05–1.2)
MONOCYTES NFR BLD: 10 % (ref 3–10)
NEUTS SEG # BLD: 2.3 K/UL (ref 1.8–8)
NEUTS SEG NFR BLD: 47 % (ref 40–73)
PLATELET # BLD AUTO: 390 K/UL (ref 135–420)
PMV BLD AUTO: 10.2 FL (ref 9.2–11.8)
RBC # BLD AUTO: 3.74 M/UL (ref 4.2–5.3)
WBC # BLD AUTO: 4.9 K/UL (ref 4.6–13.2)

## 2021-09-20 PROCEDURE — 85025 COMPLETE CBC W/AUTO DIFF WBC: CPT

## 2021-09-20 PROCEDURE — 36415 COLL VENOUS BLD VENIPUNCTURE: CPT

## 2021-09-20 NOTE — TELEPHONE ENCOUNTER
[facility] on [phone] to inform that her appointment for labs today was rescheduled from 9-7-21 for an anemia recheck (CBC was sent).

## 2021-09-20 NOTE — TELEPHONE ENCOUNTER
Pt here for labs. After seeing the lab she said she is confused as to why she was here for labs today. Please call her and advise.

## 2021-09-22 ENCOUNTER — HOSPITAL ENCOUNTER (OUTPATIENT)
Dept: CT IMAGING | Age: 51
Discharge: HOME | End: 2021-09-22
Attending: INTERNAL MEDICINE
Payer: COMMERCIAL

## 2021-09-22 DIAGNOSIS — R94.31 ABNORMAL EKG: ICD-10-CM

## 2021-09-22 DIAGNOSIS — Z82.49 FAMILY HISTORY OF CORONARY ARTERY DISEASE: ICD-10-CM

## 2021-09-22 DIAGNOSIS — I10 ESSENTIAL HYPERTENSION: ICD-10-CM

## 2021-09-22 PROCEDURE — 75571 CT HRT W/O DYE W/CA TEST: CPT

## 2021-09-30 ENCOUNTER — TELEPHONE (OUTPATIENT)
Dept: OTHER | Age: 51
End: 2021-09-30

## 2021-09-30 NOTE — TELEPHONE ENCOUNTER
Patient identity verified and matched to demographic data. Patient notified of Coronary Calcium Score of 0. Patient verbalized understanding of results, patient education, and follow up care.
Alert and oriented to person, place and time

## 2021-10-08 ENCOUNTER — TELEPHONE (OUTPATIENT)
Dept: INTERNAL MEDICINE CLINIC | Age: 51
End: 2021-10-08

## 2021-10-08 DIAGNOSIS — D64.9 ANEMIA, UNSPECIFIED TYPE: Primary | ICD-10-CM

## 2021-10-08 LAB — SARS-COV-2, NAA: NOT DETECTED

## 2021-10-08 NOTE — TELEPHONE ENCOUNTER
pls call    Results for orders placed or performed during the hospital encounter of 09/20/21   CBC WITH AUTOMATED DIFF   Result Value Ref Range    WBC 4.9 4.6 - 13.2 K/uL    RBC 3.74 (L) 4.20 - 5.30 M/uL    HGB 10.6 (L) 12.0 - 16.0 g/dL    HCT 33.3 (L) 35.0 - 45.0 %    MCV 89.0 78.0 - 100.0 FL    MCH 28.3 24.0 - 34.0 PG    MCHC 31.8 31.0 - 37.0 g/dL    RDW 15.5 (H) 11.6 - 14.5 %    PLATELET 114 168 - 891 K/uL    MPV 10.2 9.2 - 11.8 FL    NEUTROPHILS 47 40 - 73 %    LYMPHOCYTES 40 21 - 52 %    MONOCYTES 10 3 - 10 %    EOSINOPHILS 2 0 - 5 %    BASOPHILS 1 0 - 2 %    ABS. NEUTROPHILS 2.3 1.8 - 8.0 K/UL    ABS. LYMPHOCYTES 1.9 0.9 - 3.6 K/UL    ABS. MONOCYTES 0.5 0.05 - 1.2 K/UL    ABS. EOSINOPHILS 0.1 0.0 - 0.4 K/UL    ABS.  BASOPHILS 0.1 0.0 - 0.1 K/UL    DF AUTOMATED       Persistent anemia  Check follow up labs pls

## 2021-10-08 NOTE — TELEPHONE ENCOUNTER
LVM for the patient to return my call regarding their results. Patient needs to schedule lab visit for additional labs.

## 2021-10-14 NOTE — TELEPHONE ENCOUNTER
Patient returned call, given information, verbalized understanding made lab appointment for Monday. She stated that perhaps her anemia could have been caused by giving extra blood to the [facility].

## 2021-10-15 ENCOUNTER — TELEPHONE (OUTPATIENT)
Dept: INTERNAL MEDICINE CLINIC | Age: 51
End: 2021-10-15

## 2021-10-15 DIAGNOSIS — J40 BRONCHITIS: Primary | ICD-10-CM

## 2021-10-15 RX ORDER — CODEINE PHOSPHATE AND GUAIFENESIN 10; 100 MG/5ML; MG/5ML
5 SOLUTION ORAL
Qty: 105 ML | Refills: 0 | Status: SHIPPED | OUTPATIENT
Start: 2021-10-15 | End: 2021-10-22

## 2021-10-15 RX ORDER — AZITHROMYCIN 250 MG/1
TABLET, FILM COATED ORAL
Qty: 6 TABLET | Refills: 0 | Status: SHIPPED | OUTPATIENT
Start: 2021-10-15 | End: 2021-10-20

## 2021-10-15 NOTE — TELEPHONE ENCOUNTER
Patient stated that [facility] on 10/6/21, her COVID test came back negative, however she still has a cough and is unable to sleep, they prescribed tessalon pearls, but they were ineffective, she stated she had some codeine cough syrup from something else that she took and that helped her cough enough that she could sleep.   Patient advised that there were likely no openings and to [facility] if she needed to be seen again, she thinks it may be bronchitis

## 2021-10-18 ENCOUNTER — HOSPITAL ENCOUNTER (OUTPATIENT)
Dept: LAB | Age: 51
Discharge: HOME | End: 2021-10-18
Payer: COMMERCIAL

## 2021-10-18 ENCOUNTER — APPOINTMENT (OUTPATIENT)
Dept: INTERNAL MEDICINE CLINIC | Age: 51
End: 2021-10-18

## 2021-10-18 DIAGNOSIS — D64.9 ANEMIA, UNSPECIFIED TYPE: ICD-10-CM

## 2021-10-18 LAB
FERRITIN SERPL-MCNC: 4 NG/ML (ref 8–388)
FOLATE SERPL-MCNC: 9.5 NG/ML (ref 3.1–17.5)
IRON SATN MFR SERPL: 5 % (ref 20–50)
IRON SERPL-MCNC: 24 UG/DL (ref 50–175)
TIBC SERPL-MCNC: 442 UG/DL (ref 250–450)
VIT B12 SERPL-MCNC: 305 PG/ML (ref 211–911)

## 2021-10-18 PROCEDURE — 83540 ASSAY OF IRON: CPT

## 2021-10-18 PROCEDURE — 82607 VITAMIN B-12: CPT

## 2021-10-18 PROCEDURE — 82728 ASSAY OF FERRITIN: CPT

## 2021-10-18 PROCEDURE — 84155 ASSAY OF PROTEIN SERUM: CPT

## 2021-10-18 PROCEDURE — 36415 COLL VENOUS BLD VENIPUNCTURE: CPT

## 2021-10-20 LAB
ALBUMIN SERPL ELPH-MCNC: 3.8 G/DL (ref 2.9–4.4)
ALBUMIN/GLOB SERPL: 1.4 {RATIO} (ref 0.7–1.7)
ALPHA1 GLOB SERPL ELPH-MCNC: 0.2 G/DL (ref 0–0.4)
ALPHA2 GLOB SERPL ELPH-MCNC: 0.7 G/DL (ref 0.4–1)
B-GLOBULIN SERPL ELPH-MCNC: 1 G/DL (ref 0.7–1.3)
GAMMA GLOB SERPL ELPH-MCNC: 1 G/DL (ref 0.4–1.8)
GLOBULIN SER CALC-MCNC: 2.8 G/DL (ref 2.2–3.9)
M PROTEIN SERPL ELPH-MCNC: NORMAL G/DL
PROT SERPL-MCNC: 6.6 G/DL (ref 6–8.5)

## 2021-10-22 ENCOUNTER — TELEPHONE (OUTPATIENT)
Dept: INTERNAL MEDICINE CLINIC | Age: 51
End: 2021-10-22

## 2021-10-22 NOTE — TELEPHONE ENCOUNTER
Patient reached, given information, she stated she doesn't have any bleeding of any kind that she is aware of, she does have an appointment with Dr. Hayder Quiroz on the 16th of November for an endoscopy. She wanted to know if that was ok to wait that long to be seen or if she needs to do anything else.

## 2021-10-22 NOTE — TELEPHONE ENCOUNTER
pls call      Results for orders placed or performed during the hospital encounter of 10/18/21   IRON PROFILE   Result Value Ref Range    Iron 24 (L) 50 - 175 ug/dL    TIBC 442 250 - 450 ug/dL    Iron % saturation 5 (L) 20 - 50 %   VITAMIN B12 & FOLATE   Result Value Ref Range    Vitamin B12 305 211 - 911 pg/mL    Folate 9.5 3.10 - 17.50 ng/mL   FERRITIN   Result Value Ref Range    Ferritin 4 (L) 8 - 388 NG/ML   PROTEIN ELECTROPHORESIS   Result Value Ref Range    Protein, total 6.6 6.0 - 8.5 g/dL    Albumin 3.8 2.9 - 4.4 g/dL    Alpha-1-globulin 0.2 0.0 - 0.4 g/dL    ALPHA-2 GLOBULIN 0.7 0.4 - 1.0 g/dL    Beta globulin 1.0 0.7 - 1.3 g/dL    Gamma globulin 1.0 0.4 - 1.8 g/dL    M-Eduardo Not Observed Not Observed g/dL    Globulin, total 2.8 2.2 - 3.9 g/dL    A/G ratio 1.4 0.7 - 1.7       Any gu/gyn bleeding? If no, is she seeing GI?   May need scope

## 2021-11-15 ENCOUNTER — HOSPITAL ENCOUNTER (OUTPATIENT)
Dept: LAB | Age: 51
Discharge: HOME | End: 2021-11-15
Payer: COMMERCIAL

## 2021-11-15 ENCOUNTER — APPOINTMENT (OUTPATIENT)
Dept: INTERNAL MEDICINE CLINIC | Age: 51
End: 2021-11-15

## 2021-11-15 DIAGNOSIS — E04.1 THYROID NODULE: ICD-10-CM

## 2021-11-15 DIAGNOSIS — I10 PRIMARY HYPERTENSION: ICD-10-CM

## 2021-11-15 LAB
ALBUMIN SERPL-MCNC: 3.5 G/DL (ref 3.4–5)
ALBUMIN/GLOB SERPL: 1.2 {RATIO} (ref 0.8–1.7)
ALP SERPL-CCNC: 46 U/L (ref 45–117)
ALT SERPL-CCNC: 15 U/L (ref 13–56)
ANION GAP SERPL CALC-SCNC: 4 MMOL/L (ref 3–18)
AST SERPL-CCNC: 10 U/L (ref 10–38)
BILIRUB SERPL-MCNC: 0.3 MG/DL (ref 0.2–1)
BUN SERPL-MCNC: 11 MG/DL (ref 7–18)
BUN/CREAT SERPL: 14 (ref 12–20)
CALCIUM SERPL-MCNC: 8.6 MG/DL (ref 8.5–10.1)
CHLORIDE SERPL-SCNC: 109 MMOL/L (ref 100–111)
CHOLEST SERPL-MCNC: 182 MG/DL
CO2 SERPL-SCNC: 28 MMOL/L (ref 21–32)
CREAT SERPL-MCNC: 0.78 MG/DL (ref 0.6–1.3)
ERYTHROCYTE [DISTWIDTH] IN BLOOD BY AUTOMATED COUNT: 18 % (ref 11.6–14.5)
GLOBULIN SER CALC-MCNC: 3 G/DL (ref 2–4)
GLUCOSE SERPL-MCNC: 88 MG/DL (ref 74–99)
HCT VFR BLD AUTO: 34.7 % (ref 35–45)
HDLC SERPL-MCNC: 70 MG/DL (ref 40–60)
HDLC SERPL: 2.6 {RATIO} (ref 0–5)
HGB BLD-MCNC: 10.6 G/DL (ref 12–16)
LDLC SERPL CALC-MCNC: 96 MG/DL (ref 0–100)
LIPID PROFILE,FLP: ABNORMAL
MCH RBC QN AUTO: 27.7 PG (ref 24–34)
MCHC RBC AUTO-ENTMCNC: 30.5 G/DL (ref 31–37)
MCV RBC AUTO: 90.6 FL (ref 78–100)
NRBC # BLD: 0 K/UL (ref 0–0.01)
NRBC BLD-RTO: 0 PER 100 WBC
PLATELET # BLD AUTO: 381 K/UL (ref 135–420)
PMV BLD AUTO: 9.4 FL (ref 9.2–11.8)
POTASSIUM SERPL-SCNC: 4.8 MMOL/L (ref 3.5–5.5)
PROT SERPL-MCNC: 6.5 G/DL (ref 6.4–8.2)
RBC # BLD AUTO: 3.83 M/UL (ref 4.2–5.3)
SODIUM SERPL-SCNC: 141 MMOL/L (ref 136–145)
T4 FREE SERPL-MCNC: 0.8 NG/DL (ref 0.7–1.5)
TRIGL SERPL-MCNC: 80 MG/DL (ref ?–150)
TSH SERPL DL<=0.05 MIU/L-ACNC: 0.89 UIU/ML (ref 0.36–3.74)
VLDLC SERPL CALC-MCNC: 16 MG/DL
WBC # BLD AUTO: 5.3 K/UL (ref 4.6–13.2)

## 2021-11-15 PROCEDURE — 80061 LIPID PANEL: CPT

## 2021-11-15 PROCEDURE — 85027 COMPLETE CBC AUTOMATED: CPT

## 2021-11-15 PROCEDURE — 84443 ASSAY THYROID STIM HORMONE: CPT

## 2021-11-15 PROCEDURE — 84439 ASSAY OF FREE THYROXINE: CPT

## 2021-11-15 PROCEDURE — 80053 COMPREHEN METABOLIC PANEL: CPT

## 2021-11-22 ENCOUNTER — TELEPHONE (OUTPATIENT)
Dept: INTERNAL MEDICINE CLINIC | Age: 51
End: 2021-11-22

## 2021-11-22 DIAGNOSIS — R05.3 CHRONIC COUGH: ICD-10-CM

## 2021-11-22 DIAGNOSIS — R05.3 CHRONIC COUGH: Primary | ICD-10-CM

## 2021-11-22 RX ORDER — ALBUTEROL SULFATE 90 UG/1
2 AEROSOL, METERED RESPIRATORY (INHALATION)
Qty: 1 EACH | Refills: 1 | Status: SHIPPED | OUTPATIENT
Start: 2021-11-22 | End: 2021-11-22

## 2021-11-22 RX ORDER — ALBUTEROL SULFATE 90 UG/1
AEROSOL, METERED RESPIRATORY (INHALATION)
Qty: 54 G | Refills: 3 | Status: SHIPPED | OUTPATIENT
Start: 2021-11-22

## 2021-11-22 RX ORDER — PREDNISONE 20 MG/1
20 TABLET ORAL DAILY
Qty: 5 TABLET | Refills: 0 | Status: SHIPPED | OUTPATIENT
Start: 2021-11-22 | End: 2021-12-02

## 2021-11-22 NOTE — TELEPHONE ENCOUNTER
Doxy very similar to zpak  Would normally try augmentin or ceftin next   Great that cxr neg  Yes, would try the alb and pred

## 2021-11-22 NOTE — TELEPHONE ENCOUNTER
Patient reached, she stated she just returned from [facility] (she wanted to make sure it wasn't COVID) rapid test negative,     they prescribed her doxycycline- she wanted to make sure she could still take the prescriptions sent by RD (albuterol and prednisone)     also stated she had a chest xray done there and it did not look bad    stated her sputum is yellow and she has not had a fever

## 2021-11-22 NOTE — TELEPHONE ENCOUNTER
appt for 11/22 was canceled because pt was late for her appt and  pt has a cough w/ congestion . she said Dr Alejandra Dorsey had prescribed her a z-pac in october , but she still is having congestion and cough she is asking if something can be called in for her,  Patient said she was tested for covid in oct when she started to have symptoms test came up negative .  Pt does have a cpe sched for 12/20

## 2021-11-22 NOTE — TELEPHONE ENCOUNTER
zpak given over a month ago  sched cxr  We can call in pred and alb inhaler  Any discolored sputum, fever?

## 2021-12-02 ENCOUNTER — TELEPHONE (OUTPATIENT)
Dept: INTERNAL MEDICINE CLINIC | Age: 51
End: 2021-12-02

## 2021-12-02 ENCOUNTER — HOSPITAL ENCOUNTER (EMERGENCY)
Age: 51
Discharge: HOME | End: 2021-12-02
Attending: EMERGENCY MEDICINE
Payer: COMMERCIAL

## 2021-12-02 VITALS
HEIGHT: 65 IN | HEART RATE: 67 BPM | TEMPERATURE: 99.8 F | SYSTOLIC BLOOD PRESSURE: 115 MMHG | BODY MASS INDEX: 29.99 KG/M2 | RESPIRATION RATE: 18 BRPM | OXYGEN SATURATION: 100 % | DIASTOLIC BLOOD PRESSURE: 98 MMHG | WEIGHT: 180 LBS

## 2021-12-02 DIAGNOSIS — U07.1 2019 NOVEL CORONAVIRUS DISEASE (COVID-19): Primary | ICD-10-CM

## 2021-12-02 PROCEDURE — 99283 EMERGENCY DEPT VISIT LOW MDM: CPT

## 2021-12-02 PROCEDURE — M0243 CASIRIVI AND IMDEVI INFUSION: HCPCS

## 2021-12-02 PROCEDURE — 74011000636 HC RX REV CODE- 636: Performed by: EMERGENCY MEDICINE

## 2021-12-02 PROCEDURE — 74011000258 HC RX REV CODE- 258: Performed by: EMERGENCY MEDICINE

## 2021-12-02 RX ORDER — DOXYCYCLINE 100 MG/1
100 CAPSULE ORAL 2 TIMES DAILY
COMMUNITY
End: 2022-05-07 | Stop reason: ALTCHOICE

## 2021-12-02 RX ADMIN — CASIRIVIMAB AND IMDEVIMAB 1200 MG: 600; 600 INJECTION, SOLUTION, CONCENTRATE INTRAVENOUS at 15:30

## 2021-12-02 NOTE — ED PROVIDER NOTES
EMERGENCY DEPARTMENT HISTORY AND PHYSICAL EXAM    1:48 PM patient seen at this time in triage room      Date: 12/2/2021  Patient Name: Marvin Krishna    History of Presenting Illness     Chief Complaint   Patient presents with    IV Med    Positive For Covid-19         History Provided By: patient    Additional History (Context): Marvin Krishna is a 46 y.o. female presents with a significant for arthritis high blood pressure overweight. She has had 2 days of fevers and chills and some body aches, positive for coronavirus. Referred here by her primary doctor for antibody therapy. Rates her symptoms as mild to moderate at this time. Nontoxic appearance. Gisselle Money PCP: Oswaldo Bowen MD    Chief Complaint:   Duration:    Timing:    Location:   Quality:   Severity:   Modifying Factors:   Associated Symptoms:       Current Facility-Administered Medications   Medication Dose Route Frequency Provider Last Rate Last Admin    casirivimab-imdevimab (REGEN-COV) 1,200 mg in 0.9% sodium chloride (MBP/ADV) 110 mL MBP  1,200 mg IntraVENous ONCE Khari Day MD         Current Outpatient Medications   Medication Sig Dispense Refill    doxycycline (MONODOX) 100 mg capsule Take 100 mg by mouth two (2) times a day.  albuterol (PROVENTIL HFA, VENTOLIN HFA, PROAIR HFA) 90 mcg/actuation inhaler INHALE 2 PUFFS BY MOUTH EVERY 6 HOURS AS NEEDED FOR WHEEZING 54 g 3    losartan (COZAAR) 50 mg tablet Take 1 Tablet by mouth daily. (Patient taking differently: Take 25 mg by mouth daily.) 90 Tablet 3    ascorbic acid, vitamin C, (Vitamin C) 250 mg tablet Take 1,600 mg by mouth daily.  COLLAGEN by Does Not Apply route.  fluticasone (Flonase Sensimist) 27.5 mcg/actuation nasal spray 1 [place] by Both Nostrils route daily.  Dexilant 60 mg CpDB capsule (delayed release) TAKE 1 CAPSULE BY MOUTH DAILY 90 Cap 3    sucralfate (Carafate) 1 gram tablet Take 1 Tab by mouth four (4) times daily.  28 Tab 0    AIMOVIG AUTOINJECTOR 70 mg/mL injection 1 mL by SubCUTAneous route every thirty (30) days.  omeprazole (PRILOSEC OTC) 20 mg tablet Take 20 mg by mouth daily.  propranolol LA (INDERAL LA) 160 mg capsule TAKE 1 CAPSULE BY MOUTH DAILY 90 Cap 2    potassium chloride (K-DUR, KLOR-CON) 20 mEq tablet Take once daily as needed w furosemide 30 Tab 6    furosemide (LASIX) 20 mg tablet Take once daily as needed for edema 30 Tab 6    progesterone (PROMETRIUM) 200 mg capsule 400 mg.  3    rizatriptan (MAXALT) 10 mg tablet TAKE 1 TABLET BY MOUTH 1 TIME AS NEEDED FOR MIGRAINE. MAY REPEAT IN 2 HOURS AS NEEDED 12 Tab 5    Cetirizine 10 mg cap Take 10 mg by mouth daily.  acetaminophen (TYLENOL) 325 mg tablet Take  by mouth every four (4) hours as needed for Pain.  ibuprofen (MOTRIN) 200 mg tablet Take 600 mg by mouth every eight (8) hours as needed.  estradiol (MINIVELLE) 0.0375 mg/24 hr 1 Patch by TransDERmal route two (2) times a week on Wednesday and Saturday.  melatonin 3 mg tablet Take 5 mg by mouth nightly.  docusate sodium (COLACE) 100 mg capsule Take 1,600 mg by mouth two (2) times a day. 6 in the am and 10 at night      CALCIUM PO Take 500 mg by mouth two (2) times a day.  Cholecalciferol, Vitamin D3, (VITAMIN D) 2,000 unit Cap Take 1,000 Units by mouth daily. Past History     Past Medical History:  Past Medical History:   Diagnosis Date    Alcohol abuse     Bulimia     [facility] Stephanie Barnes[facility] [place]); now seeing Dr Ana Fong Constipation     Degenerative disc disease, cervical 2009    C5-6 Dr Link Asa 2019    Dr Itz Vargas Gastritis 12/12    GERD (gastroesophageal reflux disease) 2009    Maria Fernanda Austin, last EGD 12/12    H/O bone graft 05/22/2017    Dental bone graft for dental implant    Hydronephrosis of right kidney     Dr Solitario Rapp Hypertension     Hypothyroidism     Dr Leilani Gaytan Interstitial cystitis     possible per gyn    Lumbar degenerative disc disease 2014    L4-5 mild degen disc w mild central stenosis    Migraine     Dr. Mary Bullock, saw Dr. Jorge Chan also; Dr Thompson Castro the Dr Laure Zee for botox; now aimovig    Obesity (BMI 30-39.9) 3/23/2018    HERMES (obstructive sleep apnea) ? on cpap although she's not using Dr. Amanad Van, knee     Dr. Ba Marin Osteopenia  FRAX 3.0 and 0.3      DEXA t score 0.4 spine, -1.6 hip (); -0.3 spine, -1.5 hip w FRAX 3.4/0.3 (4/15); w/u neg for secondary causes    PPD positive, treated     Thyroid nodule     Dr Stan Ni; left 5mm ; no change , , ;  4mm [facility]    Venous insufficiency 2017       Past Surgical History:  Past Surgical History:   Procedure Laterality Date    HX ABDOMINOPLASTY      and mastopexy, Dr. Moreno [place]  [address]    silicone Dr Luis Carrillo      Dr. Meri Jackson  negative    HX GI      EGD w gastritis, h pylori neg Dr. Olvin Reaot X 2    IMPLANT BREAST  [facility]      Sal[place] ?     NEUROLOGICAL PROCEDURE UNLISTED      EMG negative Dr. Rosy Montero      MRI head negative    NE RENAL SCOPE,ENDOPYELOTOMY      In [place] right hydronephrosis       Family History:  Family History   Problem Relation Age of Onset    Diabetes Mother     Hypertension Mother     Alcohol abuse Mother     Liver Disease Mother     Depression Sister     Obesity Sister     Cancer Maternal Grandmother         lung    Heart Disease Maternal Grandmother        Social History:  Social History     Tobacco Use    Smoking status: Former Smoker     Quit date: 1987     Years since quittin.9    Smokeless tobacco: Never Used   Substance Use Topics    Alcohol use: Not Currently     Comment: abuse-has not drank in four years    Drug use: No     Types: Marijuana     Comment: Former use a long time ago Allergies:  No Known Allergies      Review of Systems     Review of Systems   Constitutional: Positive for fatigue and fever. Negative for diaphoresis. HENT: Negative for congestion and sore throat. Eyes: Negative for pain and itching. Respiratory: Negative for cough and shortness of breath. Cardiovascular: Negative for chest pain and palpitations. Gastrointestinal: Negative for abdominal pain and diarrhea. Endocrine: Negative for polydipsia and polyuria. Genitourinary: Negative for dysuria and hematuria. Musculoskeletal: Positive for myalgias. Negative for arthralgias. Skin: Negative for rash and wound. Neurological: Negative for seizures and syncope. Hematological: Does not bruise/bleed easily. Psychiatric/Behavioral: Negative for agitation and hallucinations. Physical Exam       Patient Vitals for the past 12 hrs:   Temp Pulse Resp BP SpO2   12/02/21 1347 99.8 °F (37.7 °C) 67 18 118/78 100 %       IPVITALS  Patient Vitals for the past 24 hrs:   BP Temp Pulse Resp SpO2 Height Weight   12/02/21 1347 118/78 99.8 °F (37.7 °C) 67 18 100 % 5' 5\" (1.651 m) 81.6 kg (180 lb)       Physical Exam  Vitals and nursing note reviewed. Constitutional:       General: She is not in acute distress. Appearance: She is well-developed. She is not ill-appearing. HENT:      Head: Normocephalic and atraumatic. Eyes:      General: No scleral icterus. Conjunctiva/sclera: Conjunctivae normal.   Neck:      Vascular: No JVD. Cardiovascular:      Rate and Rhythm: Normal rate and regular rhythm. Heart sounds: Normal heart sounds. Comments: 4 intact extremity pulses  Pulmonary:      Effort: Pulmonary effort is normal.      Breath sounds: Normal breath sounds. Abdominal:      Palpations: Abdomen is soft. There is no mass. Tenderness: There is no abdominal tenderness. Musculoskeletal:         General: Normal range of motion.       Cervical back: Normal range of motion and neck supple. Lymphadenopathy:      Cervical: No cervical adenopathy. Skin:     General: Skin is warm and dry. Neurological:      Mental Status: She is alert. Diagnostic Study Results   Labs -  No results found for this or any previous visit (from the past 12 hour(s)). Radiologic Studies -   No orders to display     No results found. Medications ordered:   Medications   casirivimab-imdevimab (REGEN-COV) 1,200 mg in 0.9% sodium chloride (MBP/ADV) 110 mL MBP (has no administration in time range)         Medical Decision Making   Initial Medical Decision Making and DDx:  Informed discussion she is already taken the recommendation of her primary care doctor Dr. Won Gamble, research shows benefit of decreased hospitalization, no demonstrated benefit as of yet for prevention of death. Risk of allergic reaction sometimes severe or fatal, increased risk due to this being antibody as opposed to other chemical. She wishes to proceed with the infusion. We will provide this and she will be discharged    ED Course: Progress Notes, Reevaluation, and Consults:     3:16 PM she is awaiting her Regeneron infusion discussed with her return to the emergency department for any new or alarming symptoms or allergic symptoms. Anticipate uneventful discharge. Dr Davon Rios aware incase any complication    I am the first provider for this patient. I reviewed the vital signs, available nursing notes, past medical history, past surgical history, family history and social history. Patient Vitals for the past 12 hrs:   Temp Pulse Resp BP SpO2   12/02/21 1347 99.8 °F (37.7 °C) 67 18 118/78 100 %       Vital Signs-Reviewed the patient's vital signs. Pulse Oximetry Analysis, Cardiac Monitor, 12 lead ekg:      Interpreted by the EP.       Records Reviewed: Nursing notes reviewed (Time of Review: 1:48 PM)    Procedures:   Critical Care Time:   Aspirin: (was aspirin given for stroke?)    Diagnosis     Clinical Impression: 1. 2019 novel coronavirus disease (COVID-19)        Disposition:       Follow-up Information     Follow up With Specialties Details Why Contact Info    Oswaldo Bowen MD Internal Medicine In 1 week  [facility]  [phone]             Patient's Medications   Start Taking    No medications on file   Continue Taking    ACETAMINOPHEN (TYLENOL) 325 MG TABLET    Take  by mouth every four (4) hours as needed for Pain. AIMOVIG AUTOINJECTOR 70 MG/ML INJECTION    1 mL by SubCUTAneous route every thirty (30) days. ALBUTEROL (PROVENTIL HFA, VENTOLIN HFA, PROAIR HFA) 90 MCG/ACTUATION INHALER    INHALE 2 PUFFS BY MOUTH EVERY 6 HOURS AS NEEDED FOR WHEEZING    ASCORBIC ACID, VITAMIN C, (VITAMIN C) 250 MG TABLET    Take 1,600 mg by mouth daily. CALCIUM PO    Take 500 mg by mouth two (2) times a day. CETIRIZINE 10 MG CAP    Take 10 mg by mouth daily. CHOLECALCIFEROL, VITAMIN D3, (VITAMIN D) 2,000 UNIT CAP    Take 1,000 Units by mouth daily. COLLAGEN    by Does Not Apply route. DEXILANT 60 MG CPDB CAPSULE (DELAYED RELEASE)    TAKE 1 CAPSULE BY MOUTH DAILY    DOCUSATE SODIUM (COLACE) 100 MG CAPSULE    Take 1,600 mg by mouth two (2) times a day. 6 in the am and 10 at night    DOXYCYCLINE (MONODOX) 100 MG CAPSULE    Take 100 mg by mouth two (2) times a day. ESTRADIOL (MINIVELLE) 0.0375 MG/24 HR    1 Patch by TransDERmal route two (2) times a week on Wednesday and Saturday. FLUTICASONE (FLONASE SENSIMIST) 27.5 MCG/ACTUATION NASAL SPRAY    1 [place] by Both Nostrils route daily. FUROSEMIDE (LASIX) 20 MG TABLET    Take once daily as needed for edema    IBUPROFEN (MOTRIN) 200 MG TABLET    Take 600 mg by mouth every eight (8) hours as needed. LOSARTAN (COZAAR) 50 MG TABLET    Take 1 Tablet by mouth daily. MELATONIN 3 MG TABLET    Take 5 mg by mouth nightly. OMEPRAZOLE (PRILOSEC OTC) 20 MG TABLET    Take 20 mg by mouth daily.     POTASSIUM CHLORIDE (K-DUR, KLOR-CON) 20 MEQ TABLET    Take once daily as needed w furosemide    PROGESTERONE (PROMETRIUM) 200 MG CAPSULE    400 mg. PROPRANOLOL LA (INDERAL LA) 160 MG CAPSULE    TAKE 1 CAPSULE BY MOUTH DAILY    RIZATRIPTAN (MAXALT) 10 MG TABLET    TAKE 1 TABLET BY MOUTH 1 TIME AS NEEDED FOR MIGRAINE. MAY REPEAT IN 2 HOURS AS NEEDED    SUCRALFATE (CARAFATE) 1 GRAM TABLET    Take 1 Tab by mouth four (4) times daily. These Medications have changed    No medications on file   Stop Taking    APPLE CIDER VINEGAR 300 MG TAB    Take 1,600 mg by mouth. BUSPIRONE (BUSPAR) 15 MG TABLET    TAKE 1 TABLET BY MOUTH TWICE DAILY    CEPHALEXIN (KEFLEX) 250 MG CAPSULE    1 p.o. 3 times daily x5 days    MAGNESIUM 250 MG TAB    Take 120 mg by mouth. PREDNISONE (DELTASONE) 20 MG TABLET    Take 20 mg by mouth daily.      _______________________________    Notes:    Jakob Red MD using Dragon dictation      _______________________________

## 2021-12-02 NOTE — TELEPHONE ENCOUNTER
Patient reached and informed of the following message per Dr. Brnia Jauregui: They will be able to give her regeneron infusion but they recommend coming early afternoon as ER full right now     pls call pt and tell her to go around 1pm and tell the front ER staff that the back staff is expecting her for the infusion tx    -  Pt verbalized understanding.

## 2021-12-02 NOTE — TELEPHONE ENCOUNTER
I had spoken with Dr Rosalee Birmingham ([facility]) last night when they found out she was covid positive - vaccinated, exposed on Thanksgiving day to neighbor, sx 1 day f, aches, no sob    Spoke with attending at [facility] at 945am today and told her situation    They will be able to give her regeneron infusion but they recommend coming early afternoon as ER full right now    pls call pt and tell her to go around 1pm and tell the front ER staff that the back staff is expecting her for the infusion tx

## 2021-12-02 NOTE — ED TRIAGE NOTES
Patient states being positive for Covid 19. She was advised by Dr. Jose A Cuenca to report to ER for IV Michelle. States symptoms since Monday. She states positive home test for covid. Patient states being fully vaccinated with Pfizer covid vaccine.

## 2021-12-03 ENCOUNTER — PATIENT OUTREACH (OUTPATIENT)
Dept: CASE MANAGEMENT | Age: 51
End: 2021-12-03

## 2021-12-03 NOTE — PROGRESS NOTES
Patient contacted regarding COVID-19 diagnosis. Discussed COVID-19 related testing which was available at this time. Test results were positive. Patient informed of results, if available? yes. Care Transition Nurse contacted the patient by telephone to perform post discharge assessment. Call within 2 business days of discharge: Yes Verified name and  with patient as identifiers. Provided introduction to self, and explanation of the CTN/ACM role, and reason for call due to risk factors for infection and/or exposure to COVID-19. Symptoms reviewed with patient who verbalized the following symptoms: fever, pain or aching joints and chills or shaking      Due to no new or worsening symptoms encounter was not routed to provider for escalation. Discussed follow-up appointments. If no appointment was previously scheduled, appointment scheduling offered:  yes. [facility] follow up appointment(s):   Future Appointments   Date Time Provider Dexter Knicaid   2021 11:00 AM Dave Mart MD [facility]     [facility] follow up appointment(s): n/a      Interventions to address risk factors: Scheduled appointment with Hemant     Advance Care Planning:   Does patient have an Advance Directive: decision makers updated. Educated patient about risk for severe COVID-19 due to risk factors according to CDC guidelines. CTN reviewed discharge instructions, medical action plan and red flag symptoms with the patient who verbalized understanding. Discussed COVID vaccination status: yes. Patient stated that she is fully vaccinated. Education provided on COVID-19 vaccination as appropriate. Discussed exposure protocols and quarantine with CDC Guidelines. Patient was given an opportunity to verbalize any questions and concerns and agrees to contact CTN or health care provider for questions related to their healthcare.     Reviewed and educated patient on any new and changed medications related to discharge diagnosis Was patient discharged with a pulse oximeter? no Discussed and confirmed pulse oximeter discharge instructions and when to notify provider or seek emergency care. CTN provided contact information. Plan for follow-up call in 5-7 days based on severity of symptoms and risk factors. Patient stated that she is feeling better today. She had no side effects from infusion. Encouraged patient to stay quarantined and to make sure that she drinks plenty of fluids. Also encouraged her to wear a mask in common areas of the home. Also encouraged patient to wipe things down in the home such as door knobs, faucets and toilet handles to keep germs down. Will follow.

## 2021-12-10 ENCOUNTER — PATIENT OUTREACH (OUTPATIENT)
Dept: CASE MANAGEMENT | Age: 51
End: 2021-12-10

## 2021-12-10 NOTE — PROGRESS NOTES
12/10/2021  12:41 PM    CTN reviewed patient chart and then attempted to call her to see how she was feeling today. Patient was unavailable at the time of the call. CTN left a voice mail message for patient- introducing myself, explaining the purpose of the call and giving contact information. Requested that patient return my call at her earliest convenience. Will follow.

## 2021-12-17 ENCOUNTER — PATIENT OUTREACH (OUTPATIENT)
Dept: CASE MANAGEMENT | Age: 51
End: 2021-12-17

## 2021-12-17 NOTE — PROGRESS NOTES
Patient resolved from [address] Transitions episode on 12/17/2021. Discussed COVID-19 related testing which was available at this time. Test results were negative. Patient informed of results, if available? yes     Patient/family has been provided the following resources and education related to COVID-19:                         Signs, symptoms and red flags related to COVID-19            [facility] exposure and quarantine guidelines            [employer] exposure contact - [phone]            Contact for their local Department of Health                 Patient currently reports that the following symptoms have improved:  no new symptoms and no worsening symptoms. No further outreach scheduled with this CTN/ACM/LPN/HC/ MA. Episode of Care resolved. Patient has this CTN/ACM/LPN/HC/MA contact information if future needs arise.

## 2022-01-12 ENCOUNTER — TELEPHONE (OUTPATIENT)
Dept: INTERNAL MEDICINE CLINIC | Age: 52
End: 2022-01-12

## 2022-01-12 DIAGNOSIS — I10 PRIMARY HYPERTENSION: ICD-10-CM

## 2022-01-12 DIAGNOSIS — E03.9 ACQUIRED HYPOTHYROIDISM: ICD-10-CM

## 2022-01-12 DIAGNOSIS — D64.9 ANEMIA, UNSPECIFIED TYPE: Primary | ICD-10-CM

## 2022-01-12 NOTE — TELEPHONE ENCOUNTER
----- Message from Jayashree Moreno sent at 1/12/2022 11:51 AM EST -----  Subject: Referral Request    QUESTIONS   Reason for referral request? Physical Bloodwork on 1/26 at 11:40, please   order and call the pt to schedule the labs   Has the physician seen you for this condition before? No   Preferred Specialist (if applicable)? Catherine Sanchez  Do you already have an appointment scheduled? Yes  Select Scheduled Date? 2022-01-26  Select Scheduled Physician? Derrek Mathew   Additional Information for Provider? PT WANTS TO SCHEDULE THE LABS PLEASE   CALL TO SCHEDULE   ---------------------------------------------------------------------------  --------------  CALL BACK INFO  What is the best way for the office to contact you? OK to leave message on   voicemail  Preferred Call Back Phone Number?  [phone]

## 2022-01-12 NOTE — TELEPHONE ENCOUNTER
Pt made physical appt for 01/26/22 and lab appt for 01/21/22    No labs seen in [facility].  Please order labs if appropriate or please advise  Thank you

## 2022-01-21 ENCOUNTER — HOSPITAL ENCOUNTER (OUTPATIENT)
Dept: LAB | Age: 52
Discharge: HOME | End: 2022-01-21
Payer: COMMERCIAL

## 2022-01-21 ENCOUNTER — APPOINTMENT (OUTPATIENT)
Dept: INTERNAL MEDICINE CLINIC | Age: 52
End: 2022-01-21

## 2022-01-21 DIAGNOSIS — I10 PRIMARY HYPERTENSION: ICD-10-CM

## 2022-01-21 DIAGNOSIS — E03.9 ACQUIRED HYPOTHYROIDISM: ICD-10-CM

## 2022-01-21 DIAGNOSIS — D64.9 ANEMIA, UNSPECIFIED TYPE: ICD-10-CM

## 2022-01-21 LAB
ALBUMIN SERPL-MCNC: 3.9 G/DL (ref 3.4–5)
ALBUMIN/GLOB SERPL: 1.3 {RATIO} (ref 0.8–1.7)
ALP SERPL-CCNC: 60 U/L (ref 45–117)
ALT SERPL-CCNC: 18 U/L (ref 13–56)
ANION GAP SERPL CALC-SCNC: 2 MMOL/L (ref 3–18)
AST SERPL-CCNC: 12 U/L (ref 10–38)
BILIRUB SERPL-MCNC: 0.4 MG/DL (ref 0.2–1)
BUN SERPL-MCNC: 15 MG/DL (ref 7–18)
BUN/CREAT SERPL: 17 (ref 12–20)
CALCIUM SERPL-MCNC: 9.2 MG/DL (ref 8.5–10.1)
CHLORIDE SERPL-SCNC: 105 MMOL/L (ref 100–111)
CHOLEST SERPL-MCNC: 199 MG/DL
CO2 SERPL-SCNC: 29 MMOL/L (ref 21–32)
CREAT SERPL-MCNC: 0.89 MG/DL (ref 0.6–1.3)
ERYTHROCYTE [DISTWIDTH] IN BLOOD BY AUTOMATED COUNT: 16.8 % (ref 11.6–14.5)
FERRITIN SERPL-MCNC: 6 NG/ML (ref 8–388)
GLOBULIN SER CALC-MCNC: 2.9 G/DL (ref 2–4)
GLUCOSE SERPL-MCNC: 102 MG/DL (ref 74–99)
HCT VFR BLD AUTO: 36.1 % (ref 35–45)
HDLC SERPL-MCNC: 77 MG/DL (ref 40–60)
HDLC SERPL: 2.6 {RATIO} (ref 0–5)
HGB BLD-MCNC: 11.3 G/DL (ref 12–16)
IRON SATN MFR SERPL: 8 % (ref 20–50)
IRON SERPL-MCNC: 34 UG/DL (ref 50–175)
LDLC SERPL CALC-MCNC: 109 MG/DL (ref 0–100)
LIPID PROFILE,FLP: ABNORMAL
MCH RBC QN AUTO: 28.5 PG (ref 24–34)
MCHC RBC AUTO-ENTMCNC: 31.3 G/DL (ref 31–37)
MCV RBC AUTO: 90.9 FL (ref 78–100)
NRBC # BLD: 0 K/UL (ref 0–0.01)
NRBC BLD-RTO: 0 PER 100 WBC
PLATELET # BLD AUTO: 466 K/UL (ref 135–420)
PMV BLD AUTO: 9.3 FL (ref 9.2–11.8)
POTASSIUM SERPL-SCNC: 4.8 MMOL/L (ref 3.5–5.5)
PROT SERPL-MCNC: 6.8 G/DL (ref 6.4–8.2)
RBC # BLD AUTO: 3.97 M/UL (ref 4.2–5.3)
SODIUM SERPL-SCNC: 136 MMOL/L (ref 136–145)
T4 FREE SERPL-MCNC: 0.9 NG/DL (ref 0.7–1.5)
TIBC SERPL-MCNC: 421 UG/DL (ref 250–450)
TRIGL SERPL-MCNC: 65 MG/DL (ref ?–150)
TSH SERPL DL<=0.05 MIU/L-ACNC: 0.82 UIU/ML (ref 0.36–3.74)
VLDLC SERPL CALC-MCNC: 13 MG/DL
WBC # BLD AUTO: 5.4 K/UL (ref 4.6–13.2)

## 2022-01-21 PROCEDURE — 80061 LIPID PANEL: CPT

## 2022-01-21 PROCEDURE — 82728 ASSAY OF FERRITIN: CPT

## 2022-01-21 PROCEDURE — 80053 COMPREHEN METABOLIC PANEL: CPT

## 2022-01-21 PROCEDURE — 84439 ASSAY OF FREE THYROXINE: CPT

## 2022-01-21 PROCEDURE — 85027 COMPLETE CBC AUTOMATED: CPT

## 2022-01-21 PROCEDURE — 36415 COLL VENOUS BLD VENIPUNCTURE: CPT

## 2022-01-21 PROCEDURE — 83540 ASSAY OF IRON: CPT

## 2022-01-21 NOTE — PROGRESS NOTES
46 y.o. WHITE/NON- female who presents for f/u    She came down with Covid in December and did have successful treatment with Regeneron antibody infusion. Denies any chest pain, shortness of breath, PND, orthopnea, palpitations or syncope. Not exercising much    She was diagnosed with iron deficiency anemia and fall, ended up getting EGD by Dr. Tran Rodriguez which did show gastritis. She has been unable to take iron pills due to severe constipation so she is just eating a lot of meat. In the interim she has gained a lot of weight and feels \"disgusted\". She would be interested in iron infusion if that is an option. Has colonoscopy scheduled for sometime in May    Her psychiatrist at [facility] left, the PA who replaced her also left, she is trying to get in with Dr. Angela Hernández but the first appointment will be late March. We discussed getting an ultrasound of thyroid which has been stable over years, she agreed to get it done next year and wants to continue following. Past Medical History:   Diagnosis Date    Alcohol abuse     Anemia, iron deficiency 08/2021    saw Dr Ricki Lala years Dr. Priyank Allison;   Meg 2011(?); now seeing Dr Simon November    Constipation     COVID-19 virus infection 12/2021    regeneron infusion    Degenerative disc disease, cervical 2009    C5-6 Dr Ofelia Eduardo 2019    Dr Gunn Zev Gastritis 12/2012    Dr Tran Rodriguez 10/21    GERD (gastroesophageal reflux disease) 2009    Darshan Vaughan, last EGD 12/12    H/O bone graft 05/22/2017    Dental bone graft for dental implant    Hydronephrosis of right kidney     Dr Ghassan Marsh Hypertension     Hypothyroidism     Dr Amy Jaramillo Interstitial cystitis     possible per gyn    Lumbar degenerative disc disease 01/2014    L4-5 mild degen disc w mild central stenosis    Migraine     Dr. Amilcar Hall, saw Dr. Archana Lawson also; Dr Shira King the Dr Nielson Lunch for botox; now aimovig    Obesity (BMI 30-39.9) 3/23/2018    HERMES (obstructive sleep apnea) ?     on cpap although she's not using Dr. Aliza Llanes, knee     Dr. Blount Nail Osteopenia  FRAX 3.0 and 0.3      DEXA t score 0.4 spine, -1.6 hip (); -0.3 spine, -1.5 hip w FRAX 3.4/0.3 (4/15); w/u neg for secondary causes    PPD positive, treated 1980    Thyroid nodule     Dr Norma Munguia; left 5mm ; no change , , ;  4mm [facility]    Venous insufficiency 2017     Past Surgical History:   Procedure Laterality Date    HX ABDOMINOPLASTY      and mastopexy, Dr. Santiago Goodpasture  [id number]    silicone Dr Krishan Schaffer      Dr. Risa Grajeda  [id number]    HX COLONOSCOPY      Dr Corona  negative    HX GI      EGD w gastritis, h pylori neg Dr. Vianca Reddy      EMG negative Dr. Vianca Reddy      MRI head negative    [place] RENAL SCOPE,ENDOPYELOTOMY      In [place] right hydroneph[place]     Social History     Socioeconomic History    Marital status:      Spouse name: Not on file    Number of children: 2    Years of education: Not on file    Highest education level: Not on file   Occupational History    Occupation: RN currently housewife     Employer: not employed   Tobacco Use    Smoking status: Former Smoker     Quit date: 1987     Years since quittin.0    Smokeless tobacco: Never Used   Substance and Sexual Activity    Alcohol use: Not Currently     Comment: abuse-has not drank in four years    Drug use: No     Types: Marijuana     Comment: Former use a long time ago   Ana Paula Corea Sexual activity: Not on file   Other Topics Concern    Not on file   Social History Narrative    Not on file     Social Determinants of Health     Financial Resource Strain:     Difficulty of Paying Living Expenses: Not on file   Food Insecurity:     Worried About [address] in the Last Year: Not on file    [address] in the Last Year: Not on file   Transportation Needs:     Lack of Transportation (Medical): Not on file    Lack of Transportation (Non-Medical): Not on file   Physical Activity:     Days of Exercise per Week: Not on file    Minutes of Exercise per Session: Not on file   Stress:     Feeling of Stress : Not on file   Social Connections:     Frequency of Communication with Friends and Family: Not on file    Frequency of Social Gatherings with Friends and Family: Not on file    Attends Congregation Services: Not on file    Active Member of [address] [place] [address] [employer] or Organizations: Not on file    Attends Club or Organization Meetings: Not on file    Marital Status: Not on file   Intimate Partner Violence:     Fear of Current or Ex-Partner: Not on file    Emotionally Abused: Not on file    Physically Abused: Not on file    Sexually Abused: Not on file   Housing Stability:     Unable to Pay for Housing in the Last Year: Not on file    Number of [place] in the Last Year: Not on file    Unstable Housing in the Last Year: Not on file     Allergies   Allergen Reactions    Tetracyclines Nausea and Vomiting        Current Outpatient Medications on File Prior to Visit   Medication Sig Dispense Refill    levocetirizine (XYZAL) 5 mg tablet Take  by mouth.  topiramate (TOPAMAX) 200 mg tablet Take 200 mg by mouth two (2) times a day.  SUMAtriptan (IMITREX) 100 mg tablet TAKE 1 TABLET BY MOUTH AT ONSET OF MIGRAINE      DULoxetine (CYMBALTA) 30 mg capsule TAKE 2 CAPSULES BY MOUTH EVERY MORNING AND 1 CAPSULE EVERY EVENING      benzonatate (TESSALON) 100 mg capsule TAKE ONE CAPSULE BY MOUTH THREE TIMES DAILY AS NEEDED FOR COUGH      doxycycline (MONODOX) 100 mg capsule Take 100 mg by mouth two (2) times a day.  albuterol (PROVENTIL HFA, VENTOLIN HFA, PROAIR HFA) 90 mcg/actuation inhaler INHALE 2 PUFFS BY MOUTH EVERY 6 HOURS AS NEEDED FOR WHEEZING 54 g 3    losartan (COZAAR) 50 mg tablet Take 1 Tablet by mouth daily.  (Patient taking differently: Take 25 mg by mouth daily.) 90 Tablet 3    ascorbic acid, vitamin C, (Vitamin C) 250 mg tablet Take 1,600 mg by mouth daily.  COLLAGEN by Does Not Apply route.  Dexilant 60 mg CpDB capsule (delayed release) TAKE 1 CAPSULE BY MOUTH DAILY 90 Cap 3    sucralfate (Carafate) 1 gram tablet Take 1 Tab by mouth four (4) times daily. 28 Tab 0    AIMOVIG AUTOINJECTOR 70 mg/mL injection 1 mL by SubCUTAneous route every thirty (30) days.  omeprazole (PRILOSEC OTC) 20 mg tablet Take 20 mg by mouth daily.  potassium chloride (K-DUR, KLOR-CON) 20 mEq tablet Take once daily as needed w furosemide 30 Tab 6    furosemide (LASIX) 20 mg tablet Take once daily as needed for edema 30 Tab 6    progesterone (PROMETRIUM) 200 mg capsule 400 mg.  3    rizatriptan (MAXALT) 10 mg tablet TAKE 1 TABLET BY MOUTH 1 TIME AS NEEDED FOR MIGRAINE. MAY REPEAT IN 2 HOURS AS NEEDED 12 Tab 5    acetaminophen (TYLENOL) 325 mg tablet Take  by mouth every four (4) hours as needed for Pain.  ibuprofen (MOTRIN) 200 mg tablet Take 600 mg by mouth every eight (8) hours as needed.  estradiol (MINIVELLE) 0.0375 mg/24 hr 1 Patch by TransDERmal route two (2) times a week on Wednesday and Saturday.  melatonin 3 mg tablet Take 5 mg by mouth nightly.  docusate sodium (COLACE) 100 mg capsule Take 1,600 mg by mouth two (2) times a day. 6 in the am and 10 at night      CALCIUM PO Take 500 mg by mouth two (2) times a day.  Cholecalciferol, Vitamin D3, (VITAMIN D) 2,000 unit Cap Take 1,000 Units by mouth daily.  [place] Thyroid 60 mg tablet TAKE 1 TABLET BY MOUTH EVERY MORNING WITH 15 MG      Fried[place] Thyroid 15 mg tablet TAKE 1 TABLET BY MOUTH EVERY MORNING WITH 60 MG      estradiol-norethindrone (COMBIPATCH) 0.05-0.14 mg/24 hr estradiol 0.05 mg-norethindrone 0.14 mg/24 hr semiwkly transderm patch   Apply 1 patch twice a week by transdermal route.       prednisoLONE acetate (PRED FORTE) 1 % ophthalmic suspension INSTILL 1 DROP IN THE LEFT EYE FOUR TIMES DAILY AFTER CATARACT SURGERY AS DIRECTED. SHAKE WELL      ciprofloxacin HCl (CILOXAN) 0.3 % ophthalmic solution INSTILL 1 DROP IN THE LEFT EYE FOUR TIMES DAILY. START 3 DAYS BEFORE CATARACT SURGERY      fluticasone (Flonase Sensimist) 27.5 mcg/actuation nasal spray 1 [place] by Both Nostrils route daily.  propranolol LA (INDERAL LA) 160 mg capsule TAKE 1 CAPSULE BY MOUTH DAILY 90 Cap 2    Cetirizine 10 mg cap Take 10 mg by mouth daily. (Patient not taking: Reported on 1/26/2022)       No current facility-administered medications on file prior to visit. REVIEW OF SYSTEMS: gyn Dr. Kaycee Rousseau 2021, mammo 9/21, colo 9/16 Dr Jagjit Dewitt  Ophtho - no vision change or eye pain  Oral - no mouth pain, tongue or tooth problems  Ears - no hearing loss, ear pain, fullness  Throat - no swallowing problems  Cardiac - no CP, PND, orthopnea, edema, palpitations or syncope  Chest - no breast masses  Resp - no wheezing, chronic coughing, dyspnea  Urinary - no dysuria, hematuria, flank pain, urgency, frequencybowel habits, bleeding, hemorrhoids    Visit Vitals  /72   Pulse 61   Temp 97 °F (36.1 °C) (Temporal)   Resp 16   Ht 5' 5\" (1.651 m)   Wt 196 lb (88.9 kg)   SpO2 100%   BMI 32.62 kg/m²   A&O x3  Affect is appropriate. Mood stable  No apparent distress  Anicteric, no JVD, adenopathy or thyromegaly. No carotid bruits or radiated murmur  Lungs clear to auscultation, no wheezes or rales  Heart showed regular rate and rhythm. No murmur, rubs, gallops  Abdomen soft nontender, no hepatosplenomegaly or masses. Extremities without edema.   Pulses 1-2+ symmetrically    LABS:  From 2/12 showed   gluc 80,   cr 0.60,    alt 29,          chol 156, tg 80,   hdl 59, ldl-c 89,      vit d 47.0  From 3/13 showed   gluc 78,   cr 0.88,     alt 24,          chol 153, tg 64,   hdl 62, ldl-c 78,   wbc 5.6, hb 13.3, plt 334, tsh 1.30,               ua neg  From 4/13 showed vit d 54.1, spep neg, pth 19  From 3/14 showed   gluc 87,   cr 0.80, gfr>60, alt 25,          chol 154, tg 51,   hdl 92, ldl-c 85,   wbc 6.1, hb 13.3, plt 331  From 3/15 showed   gluc 102, cr 0.60, gfr>60, alt 10,          chol 161, tg 73,   hdl 55, ldl-c 92,   wbc 6.8, hb 13.4, plt 296, tsh 0.88, vit d 69.5  From 6/16 showed   gluc 79,   cr 0.70, gfr>60, alt 12,          chol 196, tg 93,   hdl 62, ldl-c 116, wbc 5.5, hb 12.5, plt 336, tsh 0.41  From 3/18 showed   gluc 94,   cr 0.60, gfr>60, alt 16,          chol 171, tg 97,   hdl 60, ldl-c 91,   wbc 5.9, hb 13.2, plt 304,           ua neg  From 5/18 showed                      vit d 60.5, damaris neg, ra neg, ccp neg, b12 425, fol 14.4, esr 2, crp 0.2, syphilis neg  From 6/19 showed   gluc 99,   cr 0.80, gfr>60, alt 12,          chol 185, tg 92,   hdl 55, ldl-c 112, wbc 6.0, hb 13.2, plt 314, tsh 0.23, vit d 60.5, ua neg,   ft4 1.10  From 5/20 showed        alt 11, hba1c 5.5, chol 180, tg 115, hdl 56, ldl-c 101,             tsh 1.58,          ft4 0.90,   From 12/20 showed gluc 108, cr 0.70, gfr>60, alt 11,          chol 189, tg 122, hdl 60, ldl-c 105, wbc 6.6, hb 12.7, plt 348, tsh 1.65,        ua neg,  ft4 1.00  From 5/21 showed   gluc 97,   cr 0.75, gfr>60  From 9/21 showed               wbc 4.9, hb 10.6, plt 390  From 10/21 showed                       fe 24, %sat 5, ferritin 4, b12 305, fol 9.5, spep neg    Results for orders placed or performed during the hospital encounter of 01/21/22   IRON PROFILE   Result Value Ref Range    Iron 34 (L) 50 - 175 ug/dL    TIBC 421 250 - 450 ug/dL    Iron % saturation 8 (L) 20 - 50 %   FERRITIN   Result Value Ref Range    Ferritin 6 (L) 8 - 388 NG/ML   CBC W/O DIFF   Result Value Ref Range    WBC 5.4 4.6 - 13.2 K/uL    RBC 3.97 (L) 4.20 - 5.30 M/uL    HGB 11.3 (L) 12.0 - 16.0 g/dL    HCT 36.1 35.0 - 45.0 %    MCV 90.9 78.0 - 100.0 FL    MCH 28.5 24.0 - 34.0 PG    MCHC 31.3 31.0 - 37.0 g/dL    RDW 16. 8 (H) 11.6 - 14.5 %    PLATELET 178 (H) 554 - 420 K/uL    MPV 9.3 9.2 - 11.8 FL    NRBC 0.0 0  WBC    ABSOLUTE NRBC 0.00 0.00 - 0.01 K/uL   TSH AND FREE T4   Result Value Ref Range    TSH 0.82 0.36 - 3.74 uIU/mL    T4, Free 0.9 0.7 - 1.5 NG/DL   METABOLIC PANEL, COMPREHENSIVE   Result Value Ref Range    Sodium 136 136 - 145 mmol/L    Potassium 4.8 3.5 - 5.5 mmol/L    Chloride 105 100 - 111 mmol/L    CO2 29 21 - 32 mmol/L    Anion gap 2 (L) 3.0 - 18 mmol/L    Glucose 102 (H) 74 - 99 mg/dL    BUN 15 7.0 - 18 MG/DL    Creatinine 0.89 0.6 - 1.3 MG/DL    BUN/Creatinine ratio 17 12 - 20      GFR est AA >60 >60 ml/min/1.73m2    GFR est non-AA >60 >60 ml/min/1.73m2    Calcium 9.2 8.5 - 10.1 MG/DL    Bilirubin, total 0.4 0.2 - 1.0 MG/DL    ALT (SGPT) 18 13 - 56 U/L    AST (SGOT) 12 10 - 38 U/L    Alk. phosphatase 60 45 - 117 U/L    Protein, total 6.8 6.4 - 8.2 g/dL    Albumin 3.9 3.4 - 5.0 g/dL    Globulin 2.9 2.0 - 4.0 g/dL    A-G Ratio 1.3 0.8 - 1.7     LIPID PANEL   Result Value Ref Range    LIPID PROFILE          Cholesterol, total 199 <200 MG/DL    Triglyceride 65 <150 MG/DL    HDL Cholesterol 77 (H) 40 - 60 MG/DL    LDL, calculated 109 (H) 0 - 100 MG/DL    VLDL, calculated 13 MG/DL    CHOL/HDL Ratio 2.6 0 - 5.0       We reviewed the patient's labs from the last several visits to point out trends in the numbers        Patient Active Problem List   Diagnosis Code    Bulimia Dr. Brittney Friedman G43.909    DJD knees Dr. Nicanor Chaudhry M19.90    Osteopenia 2/13 FRAX 3.0 and 0.3  M85.80    GERD without esophagitis K21.9    Bilateral leg edema R60.0    Asymptomatic varicose veins I83.90    Thyroid nodule E04.1    Obesity (BMI 30-39. 9) E66.9    Primary hypertension I10    Acquired hypothyroidism E03.9     ASSESSMENT AND PLAN:  1. Psychiatric. Awaiting evaluation by Dr Leilani Simmons  2. Migraines. Continue current and f/u Dr. Shon delarosa  3. Thyroid nodule. US 2023  4.   GERD and constipation, recent gastritis. F/U Dr Tammi Loza, ppi  5. Osteopenia. On ca/d, encouraged wt bearing exercises  6. Edema. Prn diuretic  7. Endocrine/gyn. F/U Dr. Lm Kelley  8. HTN. Controlled on current  9. Iron def anemia. Will refer to Dr Cami Joyce      RTC 7/22    Above conditions discussed at length and patient vocalized understanding.   All questions answered to patient satisfaction

## 2022-01-26 ENCOUNTER — OFFICE VISIT (OUTPATIENT)
Dept: INTERNAL MEDICINE CLINIC | Age: 52
End: 2022-01-26
Payer: COMMERCIAL

## 2022-01-26 VITALS
HEART RATE: 61 BPM | TEMPERATURE: 97 F | BODY MASS INDEX: 32.65 KG/M2 | HEIGHT: 65 IN | SYSTOLIC BLOOD PRESSURE: 138 MMHG | DIASTOLIC BLOOD PRESSURE: 72 MMHG | OXYGEN SATURATION: 100 % | WEIGHT: 196 LBS | RESPIRATION RATE: 16 BRPM

## 2022-01-26 DIAGNOSIS — K21.9 GERD WITHOUT ESOPHAGITIS: ICD-10-CM

## 2022-01-26 DIAGNOSIS — I10 PRIMARY HYPERTENSION: Primary | ICD-10-CM

## 2022-01-26 DIAGNOSIS — E66.9 OBESITY (BMI 30-39.9): ICD-10-CM

## 2022-01-26 DIAGNOSIS — K29.00 ACUTE GASTRITIS, PRESENCE OF BLEEDING UNSPECIFIED, UNSPECIFIED GASTRITIS TYPE: ICD-10-CM

## 2022-01-26 DIAGNOSIS — D50.9 IRON DEFICIENCY ANEMIA, UNSPECIFIED IRON DEFICIENCY ANEMIA TYPE: ICD-10-CM

## 2022-01-26 DIAGNOSIS — E03.9 ACQUIRED HYPOTHYROIDISM: ICD-10-CM

## 2022-01-26 DIAGNOSIS — F50.2 BULIMIA: ICD-10-CM

## 2022-01-26 PROCEDURE — 99214 OFFICE O/P EST MOD 30 MIN: CPT | Performed by: INTERNAL MEDICINE

## 2022-01-26 RX ORDER — LEVOCETIRIZINE DIHYDROCHLORIDE 5 MG/1
TABLET, FILM COATED ORAL
COMMUNITY
End: 2022-08-02 | Stop reason: ALTCHOICE

## 2022-01-26 RX ORDER — THYROID, PORCINE 15 MG/1
TABLET ORAL
COMMUNITY
Start: 2021-12-23

## 2022-01-26 RX ORDER — SULFAMETHOXAZOLE AND TRIMETHOPRIM 800; 160 MG/1; MG/1
TABLET ORAL
COMMUNITY
Start: 2021-12-23

## 2022-01-26 NOTE — PROGRESS NOTES
Henrik Shirley presents with   Chief Complaint   Patient presents with    Follow-up     6 month    Hypertension    Thyroid Problem    Labs     1-21-22          1. \"Have you been to the ER, urgent care clinic since your last visit? Hospitalized since your last visit? \" YES, 12-2-21 COVID antibodies at [facility]    2. \"Have you seen or consulted any other health care providers outside of the [address] [place] [address] since your last visit? \" NO    3. For patients aged 39-70: Has the patient had a colonoscopy? Yes - no Care Gap present     If the patient is female:    4. For patients aged 41-77: Has the patient had a mammogram within the past 2 years? Yes - no Care Gap present    5. For patients aged 21-65: Has the patient had a pap smear?  No

## 2022-03-18 PROBLEM — E03.9 ACQUIRED HYPOTHYROIDISM: Status: ACTIVE | Noted: 2021-05-28

## 2022-03-18 PROBLEM — I10 PRIMARY HYPERTENSION: Status: ACTIVE | Noted: 2021-02-02

## 2022-03-19 PROBLEM — E04.1 THYROID NODULE: Status: ACTIVE | Noted: 2017-07-20

## 2022-03-19 PROBLEM — E66.9 OBESITY (BMI 30-39.9): Status: ACTIVE | Noted: 2018-03-23

## 2022-05-06 ENCOUNTER — OFFICE VISIT (OUTPATIENT)
Dept: INTERNAL MEDICINE CLINIC | Age: 52
End: 2022-05-06
Payer: COMMERCIAL

## 2022-05-06 VITALS
TEMPERATURE: 97.2 F | BODY MASS INDEX: 33.82 KG/M2 | OXYGEN SATURATION: 100 % | HEART RATE: 69 BPM | RESPIRATION RATE: 18 BRPM | SYSTOLIC BLOOD PRESSURE: 124 MMHG | WEIGHT: 203 LBS | DIASTOLIC BLOOD PRESSURE: 76 MMHG | HEIGHT: 65 IN

## 2022-05-06 DIAGNOSIS — M79.604 PAIN OF RIGHT LOWER EXTREMITY DUE TO INJURY: ICD-10-CM

## 2022-05-06 DIAGNOSIS — N39.0 ACUTE UTI: Primary | ICD-10-CM

## 2022-05-06 LAB
BILIRUB UR QL STRIP: NEGATIVE
GLUCOSE UR-MCNC: NEGATIVE MG/DL
KETONES P FAST UR STRIP-MCNC: NEGATIVE MG/DL
PH UR STRIP: 6.5 [PH] (ref 4.6–8)
PROT UR QL STRIP: NEGATIVE
SP GR UR STRIP: 1 (ref 1–1.03)
UA UROBILINOGEN AMB POC: NORMAL (ref 0.2–1)
URINALYSIS CLARITY POC: CLEAR
URINALYSIS COLOR POC: YELLOW
URINE BLOOD POC: NEGATIVE
URINE LEUKOCYTES POC: NORMAL
URINE NITRITES POC: NEGATIVE

## 2022-05-06 PROCEDURE — 81003 URINALYSIS AUTO W/O SCOPE: CPT | Performed by: INTERNAL MEDICINE

## 2022-05-06 PROCEDURE — 99213 OFFICE O/P EST LOW 20 MIN: CPT | Performed by: INTERNAL MEDICINE

## 2022-05-06 RX ORDER — CIPROFLOXACIN 500 MG/1
500 TABLET ORAL 2 TIMES DAILY
Qty: 10 TABLET | Refills: 0 | Status: SHIPPED | OUTPATIENT
Start: 2022-05-06 | End: 2022-05-11

## 2022-05-06 NOTE — PROGRESS NOTES
Jacqueline Vaughan presents today for   Chief Complaint   Patient presents with    Urinary Pain       Is someone accompanying this pt? no    Is the patient using any DME equipment during OV? no    Depression Screening:  3 most recent PHQ Screens 5/6/2022   PHQ Not Done -   Little interest or pleasure in doing things Not at all   Feeling down, depressed, irritable, or hopeless Not at all   Total Score PHQ 2 0       Learning Assessment:  Learning Assessment 3/17/2016   PRIMARY LEARNER Patient   HIGHEST LEVEL OF EDUCATION - PRIMARY LEARNER  -   BARRIERS PRIMARY LEARNER -   CO-LEARNER CAREGIVER -   PRIMARY LANGUAGE ENGLISH   LEARNER PREFERENCE PRIMARY OTHER (COMMENT)   ANSWERED BY patient   RELATIONSHIP SELF       Health Maintenance reviewed and discussed and ordered per Provider. Health Maintenance Due   Topic Date Due    Hepatitis C Screening  Never done    DTaP/Tdap/Td series (1 - Tdap) Never done    Cervical cancer screen  03/17/2017    COVID-19 Vaccine (3 - Booster for Pfizer series) 09/27/2021   . 1. \"Have you been to the ER, urgent care clinic since your last visit? Hospitalized since your last visit? \" No    2. \"Have you seen or consulted any other health care providers outside of the [address] [place] [address] since your last visit? \" No     3. For patients aged 39-70: Has the patient had a colonoscopy / FIT/ Cologuard? Yes - no Care Gap present      If the patient is female:    4. For patients aged 41-77: Has the patient had a mammogram within the past 2 years? Yes - no Care Gap present      5. For patients aged 21-65: Has the patient had a pap smear?  No

## 2022-05-07 NOTE — PROGRESS NOTES
REYNALDO Salas is here for a few days of dysuria, urgency, frequency, and malodorous urine. She does have a history of UTIs. She reports she is often treated with Cipro with good results, but does not recall if she has been treated with the 250 mg or the 500 mg tablets. She admits that her , a physician, often prescribes her antibiotics. She also reports a recent right lower extremity injury while leaning into her truck; she heard a \"snap\" and has pain in the right anterior tibial area. 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 She is requesting to see an orthopedist.        Past Medical History:   Diagnosis Date    Alcohol abuse     Anemia, iron deficiency 08/2021    saw Dr Salvador Gustafson years Dr. Kourtney Rodriguez; Meg 2011(?); now seeing Dr Nely Schilling    Constipation     COVID-19 virus infection 12/2021    regeneron infusion    Degenerative disc disease, cervical 2009    C5-6 Dr Francis Montoya 2019    Dr Logan Esposito Gastritis 12/2012    Dr Paola Carbone 10/21    GERD (gastroesophageal reflux disease) 2009    Chika Gore, last EGD 12/12    H/O bone graft 05/22/2017    Dental bone graft for dental implant    Hydronephrosis of right kidney     Dr Mont Frankel Hypertension     Hypothyroidism     Dr Harrington Batter Interstitial cystitis     possible per gyn    Lumbar degenerative disc disease 01/2014    L4-5 mild degen disc w mild central stenosis    Migraine     Dr. Tim Cerda, saw Dr. Andree Prasad also; Dr Samm Valdovinos the Dr Malik Everett for botox; now aimovig    Obesity (BMI 30-39.9) 3/23/2018    HEMRES (obstructive sleep apnea) 2011?     on cpap although she's not using Dr. Jac Arteaga, knee     Dr. Alessia Carmona Osteopenia 2/13 FRAX 3.0 and 0.3      DEXA t score 0.4 spine, -1.6 hip (2/13); -0.3 spine, -1.5 hip w FRAX 3.4/0.3 (4/15); w/u neg for secondary causes    PPD positive, treated 1980    Thyroid nodule Dr Elyse Blevins; left 5mm 2014; no change 2015, 2016, 8/17; 1/20 4mm [facility]    Venous insufficiency 07/2017        Past Surgical History:   Procedure Laterality Date    HX ABDOMINOPLASTY  2004    and mastopexy, Dr. Montse Vasques  9516    silicone Dr Eder Pat  8/12    Dr. Dwayne rAriola  [id number]    HX COLONOSCOPY      Dr Fiona Harris 9/16 negative    HX GI  12/12    EGD w gastritis, h pylori neg Dr. Jeff Siegel      EMG negative Dr. Jeff Siegel  4/16    MRI head negative    [place] RENAL SCOPE,ENDOPYELOTOMY  [place]    In [place] hydroneph[place]        Current Outpatient Medications   Medication Sig Dispense Refill    ciprofloxacin HCl (CIPRO) 500 mg tablet Take 1 Tablet by mouth two (2) times a day for 5 days. 10 Tablet 0    levocetirizine (XYZAL) 5 mg tablet Take  by mouth.  [place] Thyroid 60 mg tablet TAKE 1 TABLET BY MOUTH EVERY MORNING WITH 15 MG      [place] Thyroid 15 mg tablet TAKE 1 TABLET BY MOUTH EVERY MORNING WITH 60 MG      estradiol-norethindrone (COMBIPATCH) 0.05-0.14 mg/24 hr estradiol 0.05 mg-norethindrone 0.14 mg/24 hr semiwkly transderm patch   Apply 1 patch twice a week by transdermal route.  DULoxetine (CYMBALTA) 30 mg capsule TAKE 2 CAPSULES BY MOUTH EVERY MORNING AND 1 CAPSULE EVERY EVENING      benzonatate (TESSALON) 100 mg capsule TAKE ONE CAPSULE BY MOUTH THREE TIMES DAILY AS NEEDED FOR COUGH      albuterol (PROVENTIL HFA, VENTOLIN HFA, PROAIR HFA) 90 mcg/actuation inhaler INHALE 2 PUFFS BY MOUTH EVERY 6 HOURS AS NEEDED FOR WHEEZING 54 g 3    losartan (COZAAR) 50 mg tablet Take 1 Tablet by mouth daily. (Patient taking differently: Take 25 mg by mouth daily.) 90 Tablet 3    ascorbic acid, vitamin C, (Vitamin C) 250 mg tablet Take 1,600 mg by mouth daily.  COLLAGEN by Does Not Apply route.       fluticasone (Flonase Sensimist) 27.5 mcg/actuation nasal spray 1 [place] by Both Nostrils route daily.  Dexilant 60 mg CpDB capsule (delayed release) TAKE 1 CAPSULE BY MOUTH DAILY 90 Cap 3    AIMOVIG AUTOINJECTOR 70 mg/mL injection 1 mL by SubCUTAneous route every thirty (30) days.  omeprazole (PRILOSEC OTC) 20 mg tablet Take 20 mg by mouth daily.  furosemide (LASIX) 20 mg tablet Take once daily as needed for edema 30 Tab 6    acetaminophen (TYLENOL) 325 mg tablet Take  by mouth every four (4) hours as needed for Pain.  ibuprofen (MOTRIN) 200 mg tablet Take 600 mg by mouth every eight (8) hours as needed.  estradiol (MINIVELLE) 0.0375 mg/24 hr 1 Patch by TransDERmal route two (2) times a week on Wednesday and Saturday.  melatonin 3 mg tablet Take 5 mg by mouth nightly.  docusate sodium (COLACE) 100 mg capsule Take 1,600 mg by mouth two (2) times a day. 6 in the am and 10 at night      CALCIUM PO Take 500 mg by mouth two (2) times a day.  topiramate (TOPAMAX) 200 mg tablet Take 200 mg by mouth two (2) times a day.  SUMAtriptan (IMITREX) 100 mg tablet TAKE 1 TABLET BY MOUTH AT ONSET OF MIGRAINE      prednisoLONE acetate (PRED FORTE) 1 % ophthalmic suspension INSTILL 1 DROP IN THE LEFT EYE FOUR TIMES DAILY AFTER CATARACT SURGERY AS DIRECTED. SHAKE WELL      ciprofloxacin HCl (CILOXAN) 0.3 % ophthalmic solution INSTILL 1 DROP IN THE LEFT EYE FOUR TIMES DAILY. START 3 DAYS BEFORE CATARACT SURGERY (Patient not taking: Reported on 5/6/2022)      doxycycline (MONODOX) 100 mg capsule Take 100 mg by mouth two (2) times a day. (Patient not taking: Reported on 5/6/2022)      sucralfate (Carafate) 1 gram tablet Take 1 Tab by mouth four (4) times daily.  28 Tab 0    propranolol LA (INDERAL LA) 160 mg capsule TAKE 1 CAPSULE BY MOUTH DAILY 90 Cap 2    potassium chloride (K-DUR, KLOR-CON) 20 mEq tablet Take once daily as needed w furosemide 30 Tab 6    progesterone (PROMETRIUM) 200 mg capsule 400 mg.  3    rizatriptan (MAXALT) 10 mg tablet TAKE 1 TABLET BY MOUTH 1 TIME AS NEEDED FOR MIGRAINE. MAY REPEAT IN 2 HOURS AS NEEDED 12 Tab 5    Cetirizine 10 mg cap Take 10 mg by mouth daily. (Patient not taking: Reported on 2022)      Cholecalciferol, Vitamin D3, (VITAMIN D) 2,000 unit Cap Take 1,000 Units by mouth daily. Allergies   Allergen Reactions    Tetracyclines Nausea and Vomiting        Social History     Socioeconomic History    Marital status:      Spouse name: Not on file    Number of children: 2    Years of education: Not on file    Highest education level: Not on file   Occupational History    Occupation: RN currently housewife     Employer: not employed   Tobacco Use    Smoking status: Former Smoker     Quit date: 1987     Years since quittin.3    Smokeless tobacco: Never Used   Substance and Sexual Activity    Alcohol use: Not Currently     Comment: abuse-has not drank in four years    Drug use: No     Types: Marijuana     Comment: Former use a long time ago   Tim Safe Sexual activity: Not on file   Other Topics Concern    Not on file   Social History Narrative    Not on file     Social Determinants of Health     Financial Resource Strain:     Difficulty of Paying Living Expenses: Not on file   Food Insecurity:     Worried About [address] in the Last Year: Not on file    [address] in the Last Year: Not on file   Transportation Needs:     Lack of Transportation (Medical): Not on file    Lack of Transportation (Non-Medical):  Not on file   Physical Activity:     Days of Exercise per Week: Not on file    Minutes of Exercise per Session: Not on file   Stress:     Feeling of Stress : Not on file   Social Connections:     Frequency of Communication with Friends and Family: Not on file    Frequency of Social Gatherings with Friends and Family: Not on file    Attends [employer] Services: Not on file    Active Member of Clubs or Organizations: Not on file    Attends Club or Organization Meetings: Not on file    Marital Status: Not on file   Intimate Partner Violence:     Fear of Current or Ex-Partner: Not on file    Emotionally Abused: Not on file    Physically Abused: Not on file    Sexually Abused: Not on file   Housing Stability:     Unable to Pay for Housing in the Last Year: Not on file    Number of [place] in the Last Year: Not on file    Unstable Housing in the Last Year: Not on file        Family History   Problem Relation Age of Onset    Diabetes Mother     Hypertension Mother     Alcohol abuse Mother     Liver Disease Mother     Depression Sister     Obesity Sister     Cancer Maternal Grandmother         lung    Heart Disease Maternal Grandmother            Visit Vitals  /76   Pulse 69   Temp 97.2 °F (36.2 °C) (Temporal)   Resp 18   Ht 5' 5\" (1.651 m)   Wt 203 lb (92.1 kg)   SpO2 100%   BMI 33.78 kg/m²        Review of Systems   Constitutional: Negative for chills and fever. Genitourinary: Negative for flank pain. No hesitancy, no gross hematuria. Physical Exam  Constitutional:       Appearance: She is not toxic-appearing. Cardiovascular:      Rate and Rhythm: Normal rate and regular rhythm. Pulmonary:      Effort: Pulmonary effort is normal.      Breath sounds: Normal breath sounds. Abdominal:      General: Abdomen is flat. Palpations: Abdomen is soft. Tenderness: There is no abdominal tenderness. There is no right CVA tenderness or left CVA tenderness. Musculoskeletal:      Cervical back: Neck supple. Comments: Right lower extremity in knee sleeve. Skin:     General: Skin is warm and dry. Neurological:      Mental Status: She is alert and oriented to person, place, and time. Psychiatric:         Mood and Affect: Mood normal.        Urine dip positive LE          Diagnoses and all orders for this visit:    1. Acute UTI  Comments:  Send culture.   Empiric Cipro, prescribed 500 mg twice daily for 5 days, if jittery or other adverse effects, decrease to 250 mg twice daily  Orders:  -     ciprofloxacin HCl (CIPRO) 500 mg tablet; Take 1 Tablet by mouth two (2) times a day for 5 days.  -     AMB POC URINALYSIS DIP STICK AUTO W/O MICRO  -     CULTURE, URINE; Future    2. Pain of right lower extremity due to injury  Comments:  Given referral to orthopedics.   Orders:  -     Sharon Atkinson MD

## 2022-05-09 ENCOUNTER — HOSPITAL ENCOUNTER (OUTPATIENT)
Dept: LAB | Age: 52
Discharge: HOME | End: 2022-05-09
Payer: COMMERCIAL

## 2022-05-09 DIAGNOSIS — N39.0 ACUTE UTI: ICD-10-CM

## 2022-05-09 PROCEDURE — 87186 SC STD MICRODIL/AGAR DIL: CPT

## 2022-05-09 PROCEDURE — 87086 URINE CULTURE/COLONY COUNT: CPT

## 2022-05-09 PROCEDURE — 87077 CULTURE AEROBIC IDENTIFY: CPT

## 2022-05-12 LAB
BACTERIA SPEC CULT: ABNORMAL
CC UR VC: ABNORMAL
SERVICE CMNT-IMP: ABNORMAL

## 2022-06-17 ENCOUNTER — TELEPHONE (OUTPATIENT)
Dept: INTERNAL MEDICINE CLINIC | Age: 52
End: 2022-06-17

## 2022-06-17 DIAGNOSIS — U07.1 COVID-19 VIRUS INFECTION: Primary | ICD-10-CM

## 2022-07-26 ENCOUNTER — HOSPITAL ENCOUNTER (OUTPATIENT)
Dept: LAB | Age: 52
Discharge: HOME | End: 2022-07-26
Payer: COMMERCIAL

## 2022-07-26 ENCOUNTER — LAB ONLY (OUTPATIENT)
Dept: INTERNAL MEDICINE CLINIC | Age: 52
End: 2022-07-26

## 2022-07-26 DIAGNOSIS — I10 PRIMARY HYPERTENSION: ICD-10-CM

## 2022-07-26 DIAGNOSIS — Z00.00 ENCOUNTER FOR ANNUAL PHYSICAL EXAM: Primary | ICD-10-CM

## 2022-07-26 DIAGNOSIS — Z00.00 ENCOUNTER FOR ANNUAL PHYSICAL EXAM: ICD-10-CM

## 2022-07-26 LAB
ALBUMIN SERPL-MCNC: 4 G/DL (ref 3.4–5)
ALBUMIN/GLOB SERPL: 1.3 {RATIO} (ref 0.8–1.7)
ALP SERPL-CCNC: 54 U/L (ref 45–117)
ALT SERPL-CCNC: 27 U/L (ref 13–56)
ANION GAP SERPL CALC-SCNC: 7 MMOL/L (ref 3–18)
AST SERPL-CCNC: 13 U/L (ref 10–38)
BILIRUB SERPL-MCNC: 0.3 MG/DL (ref 0.2–1)
BUN SERPL-MCNC: 25 MG/DL (ref 7–18)
BUN/CREAT SERPL: 27 (ref 12–20)
CALCIUM SERPL-MCNC: 9.3 MG/DL (ref 8.5–10.1)
CHLORIDE SERPL-SCNC: 107 MMOL/L (ref 100–111)
CHOLEST SERPL-MCNC: 218 MG/DL
CO2 SERPL-SCNC: 27 MMOL/L (ref 21–32)
CREAT SERPL-MCNC: 0.91 MG/DL (ref 0.6–1.3)
ERYTHROCYTE [DISTWIDTH] IN BLOOD BY AUTOMATED COUNT: 13.6 % (ref 11.6–14.5)
GLOBULIN SER CALC-MCNC: 3 G/DL (ref 2–4)
GLUCOSE SERPL-MCNC: 97 MG/DL (ref 74–99)
HCT VFR BLD AUTO: 40.6 % (ref 35–45)
HDLC SERPL-MCNC: 64 MG/DL (ref 40–60)
HDLC SERPL: 3.4 {RATIO} (ref 0–5)
HGB BLD-MCNC: 13.1 G/DL (ref 12–16)
LDLC SERPL CALC-MCNC: 122.6 MG/DL (ref 0–100)
LIPID PROFILE,FLP: ABNORMAL
MCH RBC QN AUTO: 32.4 PG (ref 24–34)
MCHC RBC AUTO-ENTMCNC: 32.3 G/DL (ref 31–37)
MCV RBC AUTO: 100.5 FL (ref 78–100)
NRBC # BLD: 0 K/UL (ref 0–0.01)
NRBC BLD-RTO: 0 PER 100 WBC
PLATELET # BLD AUTO: 335 K/UL (ref 135–420)
PMV BLD AUTO: 9.8 FL (ref 9.2–11.8)
POTASSIUM SERPL-SCNC: 4.7 MMOL/L (ref 3.5–5.5)
PROT SERPL-MCNC: 7 G/DL (ref 6.4–8.2)
RBC # BLD AUTO: 4.04 M/UL (ref 4.2–5.3)
SODIUM SERPL-SCNC: 141 MMOL/L (ref 136–145)
TRIGL SERPL-MCNC: 157 MG/DL (ref ?–150)
VLDLC SERPL CALC-MCNC: 31.4 MG/DL
WBC # BLD AUTO: 7.4 K/UL (ref 4.6–13.2)

## 2022-07-26 PROCEDURE — 80061 LIPID PANEL: CPT

## 2022-07-26 PROCEDURE — 85027 COMPLETE CBC AUTOMATED: CPT

## 2022-07-26 PROCEDURE — 36415 COLL VENOUS BLD VENIPUNCTURE: CPT

## 2022-07-26 PROCEDURE — 80053 COMPREHEN METABOLIC PANEL: CPT

## 2022-07-26 NOTE — PROGRESS NOTES
46 y.o. WHITE/NON- female who presents for f/u    Denies any chest pain, shortness of breath, PND, orthopnea, palpitations or syncope. 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 She is asking for a replacement to help her with curbing the appetite and get the weight down    She has rash under the breasts and requesting an antifungal    Past Medical History:   Diagnosis Date    Alcohol abuse     Anemia, iron deficiency 08/2021    saw Dr Jose Galindo years Dr. Boyre Done; Bhat-Bautista 2011(?); then llumb    Constipation     COVID-19 virus infection 12/2021    regeneron infusion    Degenerative disc disease, cervical 2009    C5-6 Dr Tory Pacheco    Fibromyalgia 2019    Dr Maddy Owen    Gastritis 12/2012    Dr Velasquez Iha 10/21    GERD (gastroesophageal reflux disease) 2009    Chioma Ledezma, last EGD 12/12    H/O bone graft 05/22/2017    Dental bone graft for dental implant    Hydronephrosis of right kidney     Dr Ferdinand Jimenez    Hypertension     Hypothyroidism     Dr Jean-Claude Min     Interstitial cystitis     possible per gyn    Lumbar degenerative disc disease 01/2014    L4-5 mild degen disc w mild central stenosis    Migraine     Dr. Troy Pacheco, saw Dr. Eddie Horton also; Dr Madera Endow the Dr Abel Jaimes for botox; now aimovig    Obesity (BMI 30-39.9) 03/23/2018    HERMES (obstructive sleep apnea) 2011?     on cpap although she's not using Dr. Janie Koroma, knee     Dr. Zoe Felton    Osteopenia 2/13 FRAX 3.0 and 0.3      DEXA t score 0.4 spine, -1.6 hip (2/13); -0.3 spine, -1.5 hip w FRAX 3.4/0.3 (4/15); w/u neg for secondary causes    PPD positive, treated 1980    Thyroid nodule     Dr Genet Blackburn; left 5mm 2014; no change 2015, 2016, 8/17; 1/20 4mm [facility]    Venous insufficiency 07/2017     Past Surgical History:   Procedure Laterality Date    HX ABDOMINOPLASTY      and mastopexy, Dr. Kevin Horton  3924    silicone Dr Nilesh Rothmank      Dr. Donna Guerrero  [id number]    HX COLONOSCOPY      Dr Dexter Ayala  negative    HX GI      EGD w gastritis, h pylori neg Dr. Naveed Estrada      EMG negative Dr. Naveed Estrada      MRI head negative    [place] RENAL SCOPE[place]ENDOP[place]ELOTOM[place]      In [place] right hydroneph[place]     Social History     Socioeconomic History    Marital status:      Spouse name: Not on file    Number of children: 2    Years of education: Not on file    Highest education level: Not on file   Occupational History    Occupation: RN currently housewife     Employer: not employed   Tobacco Use    Smoking status: Former     Types: Cigarettes     Quit date: 1987     Years since quittin.6    Smokeless tobacco: Never   Substance and Sexual Activity    Alcohol use: Not Currently     Comment: abuse-has not drank in four years    Drug use: No     Types: Marijuana     Comment: Former use a long time ago    Sexual activity: Not on file   Other Topics Concern    Not on file   Social History Narrative    Not on file     Social Determinants of Health     Financial Resource Strain: Not on file   Food Insecurity: Not on file   Transportation Needs: Not on file   Physical Activity: Not on file   Stress: Not on file   Social Connections: Not on file   Intimate Partner Violence: Not on file   Housing Stability: Not on file     Allergies   Allergen Reactions    Tetracyclines Nausea and Vomiting        Current Outpatient Medications on File Prior to Visit   Medication Sig Dispense Refill    [place] Thyroid 60 mg tablet TAKE 1 TABLET BY MOUTH EVERY MORNING WITH 15 MG      [place] Thyroid 15 mg tablet TAKE 1 TABLET BY MOUTH EVERY MORNING WITH 60 MG      prednisoLONE acetate (PRED FORTE) 1 % ophthalmic suspension INSTILL 1 DROP IN THE LEFT EYE FOUR TIMES DAILY AFTER CATARACT SURGERY AS DIRECTED. SHAKE WELL      DULoxetine 60 mg CDRS TAKE 2 CAPSULES BY MOUTH EVERY MORNING AND 1 CAPSULE EVERY EVENING      albuterol (PROVENTIL HFA, VENTOLIN HFA, PROAIR HFA) 90 mcg/actuation inhaler INHALE 2 PUFFS BY MOUTH EVERY 6 HOURS AS NEEDED FOR WHEEZING 54 g 3    ascorbic acid, vitamin C, (VITAMIN C) 250 mg tablet Take 1,600 mg by mouth daily. COLLAGEN by Does Not Apply route. fluticasone (Flonase Sensimist) 27.5 mcg/actuation nasal spray 1 [place] by Both Nostrils route daily. Dexilant 60 mg CpDB capsule (delayed release) TAKE 1 CAPSULE BY MOUTH DAILY 90 Cap 3    AIMOVIG AUTOINJECTOR 70 mg/mL injection 1 mL by SubCUTAneous route every thirty (30) days. omeprazole (PRILOSEC OTC) 20 mg tablet Take 20 mg by mouth daily. potassium chloride (K-DUR, KLOR-CON) 20 mEq tablet Take once daily as needed w furosemide 30 Tab 6    furosemide (LASIX) 20 mg tablet Take once daily as needed for edema 30 Tab 6    progesterone (PROMETRIUM) 200 mg capsule 400 mg.  3    acetaminophen (TYLENOL) 325 mg tablet Take  by mouth every four (4) hours as needed for Pain. ibuprofen (MOTRIN) 200 mg tablet Take 600 mg by mouth every eight (8) hours as needed. estradioL (VIVELLE) 0.0375 mg/24 hr 1 Patch by TransDERmal route two (2) times a week on Wednesday and Saturday. melatonin 3 mg tablet Take 5 mg by mouth nightly. docusate sodium (COLACE) 100 mg capsule Take 1,600 mg by mouth two (2) times a day. 6 in the am and 10 at night      cholecalciferol (VITAMIN D3) (2,000 UNITS /50 MCG) cap capsule Take 1,000 Units by mouth daily. nadoloL (CORGARD) 40 mg tablet Take 40 mg by mouth nightly. Trulance 3 mg tab TAKE 1 TABLET BY MOUTH DAILY WITH OR WITHOUT FOOD FOR CONSTIPATION      [DISCONTINUED] nirmatrelvir-ritonavir (PAXLOVID) 150 mg x 2- 100 mg tablet Take 3 Tablets by mouth every twelve (12) hours.  (Patient not taking: Reported on 8/1/2022) 1 Box 0    [DISCONTINUED] levocetirizine (XYZAL) 5 mg tablet Take  by mouth. (Patient not taking: Reported on 8/1/2022)      [DISCONTINUED] estradiol-norethindrone (COMBIPATCH) 0.05-0.14 mg/24 hr estradiol 0.05 mg-norethindrone 0.14 mg/24 hr semiwkly transderm patch   Apply 1 patch twice a week by transdermal route. (Patient not taking: Reported on 8/1/2022)      topiramate (TOPAMAX) 200 mg tablet Take 200 mg by mouth two (2) times a day. SUMAtriptan (IMITREX) 100 mg tablet TAKE 1 TABLET BY MOUTH AT ONSET OF MIGRAINE      [DISCONTINUED] benzonatate (TESSALON) 100 mg capsule TAKE ONE CAPSULE BY MOUTH THREE TIMES DAILY AS NEEDED FOR COUGH (Patient not taking: Reported on 8/1/2022)      sucralfate (Carafate) 1 gram tablet Take 1 Tab by mouth four (4) times daily. 28 Tab 0    [DISCONTINUED] propranolol LA (INDERAL LA) 160 mg capsule TAKE 1 CAPSULE BY MOUTH DAILY (Patient not taking: Reported on 8/1/2022) 90 Cap 2    rizatriptan (MAXALT) 10 mg tablet TAKE 1 TABLET BY MOUTH 1 TIME AS NEEDED FOR MIGRAINE. MAY REPEAT IN 2 HOURS AS NEEDED 12 Tab 5    [DISCONTINUED] CALCIUM PO Take 500 mg by mouth two (2) times a day. (Patient not taking: Reported on 8/1/2022)       No current facility-administered medications on file prior to visit. REVIEW OF SYSTEMS: gyn Dr. Darien Benjamin 2021, mammo 9/21, colo 9/16 Dr Lm Franco  Ophtho - no vision change or eye pain  Oral - no mouth pain, tongue or tooth problems  Ears - no hearing loss, ear pain, fullness  Throat - no swallowing problems  Cardiac - no CP, PND, orthopnea, edema, palpitations or syncope  Chest - no breast masses  Resp - no wheezing, chronic coughing, dyspnea  Urinary - no dysuria, hematuria, flank pain, urgency, frequencybowel habits, bleeding, hemorrhoids    Visit Vitals  /79   Pulse 66   Temp 97 °F (36.1 °C) (Temporal)   Resp 16   Ht 5' 5\" (1.651 m)   Wt 206 lb (93.4 kg)   SpO2 98%   BMI 34.28 kg/m²     A&O x3  Affect is appropriate.   Mood stable  No apparent distress  Anicteric, no JVD, adenopathy or thyromegaly. No carotid bruits or radiated murmur  Lungs clear to auscultation, no wheezes or rales  Heart showed regular rate and rhythm. No murmur, rubs, gallops  Abdomen soft nontender, no hepatosplenomegaly or masses. Extremities without edema.   Pulses 1-2+ symmetrically    LABS:  From 2/12 showed   gluc 80,   cr 0.60,    alt 29,          chol 156, tg 80,   hdl 59, ldl-c 89,       vit d 47.0  From 3/13 showed   gluc 78,   cr 0.88,     alt 24,          chol 153, tg 64,   hdl 62, ldl-c 78,   wbc 5.6, hb 13.3, plt 334, tsh 1.30,        ua neg  From 4/13 showed                                  vit d 54.1,                       spep neg, pth 19  From 3/14 showed   gluc 87,   cr 0.80, gfr>60, alt 25,          chol 154, tg 51,   hdl 92, ldl-c 85,   wbc 6.1, hb 13.3, plt 331  From 3/15 showed   gluc 102, cr 0.60, gfr>60, alt 10,          chol 161, tg 73,   hdl 55, ldl-c 92,   wbc 6.8, hb 13.4, plt 296, tsh 0.88, vit d 69.5  From 6/16 showed   gluc 79,   cr 0.70, gfr>60, alt 12,          chol 196, tg 93,   hdl 62, ldl-c 116, wbc 5.5, hb 12.5, plt 336, tsh 0.41  From 3/18 showed   gluc 94,   cr 0.60, gfr>60, alt 16,          chol 171, tg 97,   hdl 60, ldl-c 91,   wbc 5.9, hb 13.2, plt 304,            ua neg  From 5/18 showed                       vit d 60.5, damaris neg, ra neg, ccp neg, b12 425, fol 14.4, esr 2, crp 0.2, syphilis neg  From 6/19 showed   gluc 99,   cr 0.80, gfr>60, alt 12,          chol 185, tg 92,   hdl 55, ldl-c 112, wbc 6.0, hb 13.2, plt 314, tsh 0.23, vit d 60.5, ua neg,   ft4 1.10  From 5/20 showed        alt 11, hba1c 5.5, chol 180, tg 115, hdl 56, ldl-c 101,              tsh 1.58,            ft4 0.90,   From 12/20 showed gluc 108, cr 0.70, gfr>60, alt 11,          chol 189, tg 122, hdl 60, ldl-c 105, wbc 6.6, hb 12.7, plt 348, tsh 1.65,        ua neg,   ft4 1.00  From 5/21 showed   gluc 97,   cr 0.75, gfr>60  From 9/21 showed wbc 4.9, hb 10.6, plt 390  From 10/21 showed                                 fe 24, %sat 5, ferritin 4, b12 305, fol 9.5, spep neg  From 1/22 showed   gluc 102, cr 0.89, gfr>60, alt 18,          chol 199, tg 65,   hdl 77, ldl-c 109, wbc 5.4, hb 11.3, plt 466,              fe 34, %sat 8  From 7/22 showed   gluc 97,   cr 0.91, gfr>60, alt 27,          chol 218, tg 157, hdl 64, ldl-c 123, wbc 7.4, hb 13.1, plt 335    We reviewed the patient's labs from the last several visits to point out trends in the numbers        Patient Active Problem List   Diagnosis Code    Bulimia Dr. Luis Angry Dr. Kiran Marroquin G43.909    DJD knees Dr. Mikayla Be M19.90    Osteopenia 2/13 FRAX 3.0 and 0.3  M85.80    GERD without esophagitis K21.9    Bilateral leg edema R60.0    Asymptomatic varicose veins I83.90    Thyroid nodule E04.1    Obesity (BMI 30-39. 9) E66.9    Primary hypertension I10    Acquired hypothyroidism E03.9     ASSESSMENT AND PLAN:  1. Psychiatric. Follow up Dr Jose E Fisher  2. Migraines. Continue current and f/u Dr. Chato [place] group  3. Thyroid nodule. US 2023  4. GERD and constipation, recent gastritis. F/U Dr Izzy Maynard, ppi  5. Osteopenia. On ca/d, encouraged wt bearing exercises  6. Edema. Prn diuretic  7. Endocrine/gyn. F/U Dr. Violet Adams  8. HTN. Controlled on current  9. Iron def anemia. Follow up heme and gi  10. Obesity. IFG. Long discussion about med options. Trial of ozempic although I warned her that it may not be covered by insurance, sfx reviewed  11. Tinea. Econazole       RTC 11/22    Above conditions discussed at length and patient vocalized understanding. All questions answered to patient satisfaction        ICD-10-CM ICD-9-CM    1.  IFG (impaired fasting glucose)  R73.01 790.21 semaglutide (OZEMPIC) 0.25 mg or 0.5 mg/dose (2 mg/1.5 ml) subq pen      METABOLIC PANEL, BASIC      HEMOGLOBIN A1C WITH EAG      DISCONTINUED: semaglutide (OZEMPIC) 0.25 mg or 0.5 mg/dose (2 mg/1.5 ml) subq pen      2. Tinea cruris  B35.6 110.3 econazole nitrate (SPECTAZOLE) 1 % topical cream      DISCONTINUED: econazole nitrate (SPECTAZOLE) 1 % topical cream      3. Primary hypertension  I10 401.9       4. Acquired hypothyroidism  E03.9 244.9       5. Thyroid nodule  E04.1 241.0       6. Obesity (BMI 30-39. 9)  E66.9 278.00 semaglutide (OZEMPIC) 0.25 mg or 0.5 mg/dose (2 mg/1.5 ml) subq pen      DISCONTINUED: semaglutide (OZEMPIC) 0.25 mg or 0.5 mg/dose (2 mg/1.5 ml) subq pen      7.  Bulimia Dr. Jorge Jordan  F50.2 307.51

## 2022-08-01 ENCOUNTER — OFFICE VISIT (OUTPATIENT)
Dept: INTERNAL MEDICINE CLINIC | Age: 52
End: 2022-08-01
Payer: COMMERCIAL

## 2022-08-01 VITALS
HEIGHT: 65 IN | WEIGHT: 206 LBS | DIASTOLIC BLOOD PRESSURE: 79 MMHG | HEART RATE: 66 BPM | SYSTOLIC BLOOD PRESSURE: 138 MMHG | BODY MASS INDEX: 34.32 KG/M2 | TEMPERATURE: 97 F | OXYGEN SATURATION: 98 % | RESPIRATION RATE: 16 BRPM

## 2022-08-01 DIAGNOSIS — R73.01 IFG (IMPAIRED FASTING GLUCOSE): Primary | ICD-10-CM

## 2022-08-01 DIAGNOSIS — E04.1 THYROID NODULE: ICD-10-CM

## 2022-08-01 DIAGNOSIS — B35.6 TINEA CRURIS: ICD-10-CM

## 2022-08-01 DIAGNOSIS — F50.2 BULIMIA: ICD-10-CM

## 2022-08-01 DIAGNOSIS — E03.9 ACQUIRED HYPOTHYROIDISM: ICD-10-CM

## 2022-08-01 DIAGNOSIS — I10 PRIMARY HYPERTENSION: ICD-10-CM

## 2022-08-01 DIAGNOSIS — E66.9 OBESITY (BMI 30-39.9): ICD-10-CM

## 2022-08-01 PROCEDURE — 99214 OFFICE O/P EST MOD 30 MIN: CPT | Performed by: INTERNAL MEDICINE

## 2022-08-01 RX ORDER — PLECANATIDE 3 MG/1
TABLET ORAL
COMMUNITY
Start: 2022-05-07

## 2022-08-01 RX ORDER — NADOLOL 40 MG/1
40 TABLET ORAL
COMMUNITY
Start: 2022-06-07

## 2022-08-02 RX ORDER — ECONAZOLE NITRATE 10 MG/G
CREAM TOPICAL 2 TIMES DAILY
Qty: 15 G | Refills: 0 | Status: SHIPPED | OUTPATIENT
Start: 2022-08-02 | End: 2022-08-02 | Stop reason: SDUPTHER

## 2022-08-02 RX ORDER — ECONAZOLE NITRATE 10 MG/G
CREAM TOPICAL 2 TIMES DAILY
Qty: 15 G | Refills: 0 | Status: SHIPPED | OUTPATIENT
Start: 2022-08-02

## 2022-08-10 LAB — HBA1C MFR BLD HPLC: 5.5 %

## 2022-09-02 DIAGNOSIS — M54.41 ACUTE BILATERAL LOW BACK PAIN WITH RIGHT-SIDED SCIATICA: Primary | ICD-10-CM

## 2022-09-02 RX ORDER — METHYLPREDNISOLONE 4 MG/1
4 TABLET ORAL
Qty: 1 DOSE PACK | Refills: 0 | Status: SHIPPED | OUTPATIENT
Start: 2022-09-02

## 2022-09-02 NOTE — TELEPHONE ENCOUNTER
Patient calling for lower back pain constant level 6, states steroid tapers have helped in the past, requesting medrol dosepak (pended)  Patient also states that she has had more urinary leakage than before, but does not believe its any kind of infection, patient advised to go to urgent care or ER for eval, however, did not indicate that she was planning on going.

## 2022-09-27 ENCOUNTER — OFFICE VISIT (OUTPATIENT)
Dept: ORTHOPEDIC SURGERY | Age: 52
End: 2022-09-27
Payer: COMMERCIAL

## 2022-09-27 VITALS
TEMPERATURE: 97.1 F | OXYGEN SATURATION: 98 % | BODY MASS INDEX: 34.79 KG/M2 | WEIGHT: 208.8 LBS | HEIGHT: 65 IN | HEART RATE: 58 BPM

## 2022-09-27 DIAGNOSIS — M54.50 CHRONIC BILATERAL LOW BACK PAIN WITHOUT SCIATICA: Primary | ICD-10-CM

## 2022-09-27 DIAGNOSIS — G89.29 CHRONIC BILATERAL LOW BACK PAIN WITHOUT SCIATICA: Primary | ICD-10-CM

## 2022-09-27 DIAGNOSIS — M54.2 NECK PAIN: ICD-10-CM

## 2022-09-27 PROBLEM — E11.9 TYPE 2 DIABETES MELLITUS (HCC): Status: ACTIVE | Noted: 2022-09-27

## 2022-09-27 PROCEDURE — 72100 X-RAY EXAM L-S SPINE 2/3 VWS: CPT | Performed by: NURSE PRACTITIONER

## 2022-09-27 PROCEDURE — 99213 OFFICE O/P EST LOW 20 MIN: CPT | Performed by: NURSE PRACTITIONER

## 2022-09-27 NOTE — PROGRESS NOTES
Chief complaint   Chief Complaint   Patient presents with    Back Pain       History of Present Illness:  Sujatha Russell is a  46 y.o.  female who comes in today after last being seen by Dr. Ruma Whitaker in 2019. That time she was being seen for her neck but has had some chronic back pain in the past with radicular pain. She states she still has some neck pain and has decreased range of motion. Dry needling has really helped her neck in the past.  Of most concern today is her low back pain. She does not get any radicular pain. She states is been ongoing for about a month and a half and she saw her PCP who gave her a Medrol Dosepak which did help somewhat. She really does not like to take NSAIDs due to a history of migraines and she gets reflex headaches when she takes NSAIDs. 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 She denies fever bowel bladder dysfunction. Physical Exam: The patient is a 47-year-old female well-developed well-nourished who is alert and oriented with a normal mood and affect. She has a full weightbearing nonantalgic gait. She has normal tandem gait. She has 5 out of 5 strength bilateral upper and lower extremities. Negative straight leg raise. Negative Ilan's. Only very mild pain with hyperextension lumbar spine. Assessment and Plan: This is a patient who has neck pain and back pain without radicular pain. I did get a lumbar AP and lateral x-ray. I will put her in physical therapy for both her neck and her back with dry needling and other modalities. We will see her back in 2 months. Sooner if needed. XRAY: 09/27/22   body part: Lumbar  side (rt/lt): bilateral  number of views taken:2  Findings: no acute finding    The x-ray will be officially read by Dr. Sung nAgel and scanned into the chart. Medications:  Current Outpatient Medications   Medication Sig Dispense Refill    econazole nitrate (SPECTAZOLE) 1 % topical cream Apply  to affected area two (2) times a day.  15 g 0    semaglutide (OZEMPIC) 0.25 mg or 0.5 mg/dose (2 mg/1.5 ml) subq pen 0.25 mg by SubCUTAneous route every seven (7) days. 1 Box 0    nadoloL (CORGARD) 40 mg tablet Take 40 mg by mouth nightly. Trulance 3 mg tab TAKE 1 TABLET BY MOUTH DAILY WITH OR WITHOUT FOOD FOR CONSTIPATION      Beulah Thyroid 60 mg tablet TAKE 1 TABLET BY MOUTH EVERY MORNING WITH 15 MG      Beulah Thyroid 15 mg tablet TAKE 1 TABLET BY MOUTH EVERY MORNING WITH 60 MG      SUMAtriptan (IMITREX) 100 mg tablet TAKE 1 TABLET BY MOUTH AT ONSET OF MIGRAINE      prednisoLONE acetate (PRED FORTE) 1 % ophthalmic suspension INSTILL 1 DROP IN THE LEFT EYE FOUR TIMES DAILY AFTER CATARACT SURGERY AS DIRECTED. SHAKE WELL      DULoxetine 60 mg CDRS TAKE 2 CAPSULES BY MOUTH EVERY MORNING AND 1 CAPSULE EVERY EVENING      albuterol (PROVENTIL HFA, VENTOLIN HFA, PROAIR HFA) 90 mcg/actuation inhaler INHALE 2 PUFFS BY MOUTH EVERY 6 HOURS AS NEEDED FOR WHEEZING 54 g 3    ascorbic acid, vitamin C, (VITAMIN C) 250 mg tablet Take 1,600 mg by mouth daily. COLLAGEN by Does Not Apply route. fluticasone (Flonase Sensimist) 27.5 mcg/actuation nasal spray 1 [place] by Both Nostrils route daily. Dexilant 60 mg CpDB capsule (delayed release) TAKE 1 CAPSULE BY MOUTH DAILY 90 Cap 3    sucralfate (Carafate) 1 gram tablet Take 1 Tab by mouth four (4) times daily. 28 Tab 0    AIMOVIG AUTOINJECTOR 70 mg/mL injection 1 mL by SubCUTAneous route every thirty (30) days. omeprazole (PRILOSEC OTC) 20 mg tablet Take 20 mg by mouth daily.       potassium chloride (K-DUR, KLOR-CON) 20 mEq tablet Take once daily as needed w furosemide 30 Tab 6    furosemide (LASIX) 20 mg tablet Take once daily as needed for edema 30 Tab 6    progesterone (PROMETRIUM) 200 mg capsule 400 mg.  3    rizatriptan (MAXALT) 10 mg tablet TAKE 1 TABLET BY MOUTH 1 TIME AS NEEDED FOR MIGRAINE. MAY REPEAT IN 2 HOURS AS NEEDED 12 Tab 5    acetaminophen (TYLENOL) 325 mg tablet Take  by mouth every four (4) hours as needed for Pain. ibuprofen (MOTRIN) 200 mg tablet Take 600 mg by mouth every eight (8) hours as needed. estradioL (VIVELLE) 0.0375 mg/24 hr 1 Patch by TransDERmal route two (2) times a week on Wednesday and Saturday. melatonin 3 mg tablet Take 5 mg by mouth nightly. docusate sodium (COLACE) 100 mg capsule Take 1,600 mg by mouth two (2) times a day. 6 in the am and 10 at night      cholecalciferol (VITAMIN D3) (2,000 UNITS /50 MCG) cap capsule Take 1,000 Units by mouth daily. methylPREDNISolone (MEDROL DOSEPACK) 4 mg tablet Take 1 Tablet by mouth Specific Days and Specific Times. Use as directed per package (Patient not taking: Reported on 9/27/2022) 1 Dose Pack 0    losartan (COZAAR) 25 mg tablet Take 1 Tablet by mouth in the morning. (Patient taking differently: Take 50 mg by mouth daily.) 90 Tablet 3    topiramate (TOPAMAX) 200 mg tablet Take 200 mg by mouth two (2) times a day. (Patient not taking: Reported on 9/27/2022)             Review of systems:    Past Medical History:   Diagnosis Date    Alcohol abuse     Anemia, iron deficiency 08/2021    saw Dr Garnica Fish years Dr. Court Wu;   Meg 2011(?); then llumb    Constipation     COVID-19 virus infection 12/2021    regeneron infusion    Degenerative disc disease, cervical 2009    C5-6 Dr John Gomez    Fibromyalgia 2019    Dr Henley Home    Gastritis 12/2012    Dr Damian Velasquez 10/21    GERD (gastroesophageal reflux disease) 2009    Riley Hart, last EGD 12/12    H/O bone graft 05/22/2017    Dental bone graft for dental implant    Hydronephrosis of right kidney     Dr Raman Second    Hypertension     Hypothyroidism     Dr Harper Sena     Interstitial cystitis     possible per gyn    Lumbar degenerative disc disease 01/2014    L4-5 mild degen disc w mild central stenosis    Migraine     Dr. John Gomez, saw Dr. Jamee Sweeney also; Dr Blessing Vela the Dr Veronica Hagen for botox; now aimovig    Obesity (BMI 30-39.9) 03/23/2018    HERMES (obstructive sleep apnea) ?     on cpap although she's not using Dr. Arslan Bliss, knee     Dr. Cecelia Jackson    Osteopenia  FRAX 3.0 and 0.3      DEXA t score 0.4 spine, -1.6 hip (); -0.3 spine, -1.5 hip w FRAX 3.4/0.3 (4/15); w/u neg for secondary causes    PPD positive, treated     Thyroid nodule     Dr Stan Ni; left 5mm ; no change , , ;  4mm [facility]    Venous insufficiency 2017     Past Surgical History:   Procedure Laterality Date    HX ABDOMINOPLASTY      and mastopexy, Dr. Nag Aranda  [id number]    silicone Dr Dinh Baer      Dr. Rico Officer  [id number]    HX COLONOSCOPY      Dr Cecelia Jackson  negative    HX GI      EGD w gastritis, h pylori neg Dr. Nitza Jones      EMG negative Dr. Nitza Jones      MRI head negative    AL RENAL SCOPE,ENDOPYELOTOMY      In [place] after right hydronephrosis     Social History     Socioeconomic History    Marital status:      Spouse name: Not on file    Number of children: 2    Years of education: Not on file    Highest education level: Not on file   Occupational History    Occupation: RN currently housewife     Employer: not employed   Tobacco Use    Smoking status: Former     Packs/day: 1.00     Years: 3.00     Pack years: 3.00     Types: Cigarettes     Quit date: 1987     Years since quittin.7    Smokeless tobacco: Never   Substance and Sexual Activity    Alcohol use: Not Currently     Comment: abuse-has not drank in four years    Drug use: No     Types: Marijuana     Comment: Former use a long time ago    Sexual activity: Not on file   Other Topics Concern    Not on file   Social History Narrative    Not on file     Social Determinants of Health     Financial Resource Strain: Not on file   Food Insecurity: Not on file   Transportation Needs: Not on file   Physical Activity: Not on file   Stress: Not on file Social Connections: Not on file   Intimate Partner Violence: Not on file   Housing Stability: Not on file     Family History   Problem Relation Age of Onset    Diabetes Mother     Hypertension Mother     Alcohol abuse Mother     Liver Disease Mother     Depression Sister     Obesity Sister     Cancer Maternal Grandmother         lung    Heart Disease Maternal Grandmother        Physical Exam:  Visit Vitals  Pulse (!) 58   Temp 97.1 °F (36.2 °C) (Temporal)   Ht 5' 5\" (1.651 m)   Wt 208 lb 12.8 oz (94.7 kg)   SpO2 98%   BMI 34.75 kg/m²     Pain Scale: 3/10       has been . reviewed and is appropriate          Diagnoses and all orders for this visit:    1. Chronic bilateral low back pain without sciatica  -     AMB POC XRAY, SPINE, LUMBOSACRAL; 2 O  -     REFERRAL TO PHYSICAL THERAPY    2. Neck pain  -     REFERRAL TO PHYSICAL THERAPY          Follow-up and Dispositions    Return in about 2 months (around 11/27/2022) for with Dr. Stern. We have informed Christ Gresham to notify us for immediate appointment if she has any worsening neurogical symptoms or if an emergency situation presents, then call 911    Please note that this dictation was completed with [employer], the [employer] voice recognition software. Quite often unanticipated grammatical, syntax, homophones, and other interpretive errors are inadvertently transcribed by the computer software. Please disregard these errors. Please excuse any errors that have escaped final proofreading.

## 2022-10-23 DIAGNOSIS — E66.9 OBESITY (BMI 30-39.9): ICD-10-CM

## 2022-10-23 DIAGNOSIS — R73.01 IFG (IMPAIRED FASTING GLUCOSE): ICD-10-CM

## 2022-10-24 RX ORDER — SEMAGLUTIDE 1.34 MG/ML
0.5 INJECTION, SOLUTION SUBCUTANEOUS
Qty: 1.5 ML | Refills: 0 | Status: SHIPPED | OUTPATIENT
Start: 2022-10-24

## 2022-11-28 ENCOUNTER — OFFICE VISIT (OUTPATIENT)
Dept: ORTHOPEDIC SURGERY | Age: 52
End: 2022-11-28
Payer: COMMERCIAL

## 2022-11-28 VITALS
WEIGHT: 207 LBS | HEIGHT: 65 IN | TEMPERATURE: 97.2 F | OXYGEN SATURATION: 99 % | HEART RATE: 68 BPM | BODY MASS INDEX: 34.49 KG/M2

## 2022-11-28 DIAGNOSIS — M51.36 DDD (DEGENERATIVE DISC DISEASE), LUMBAR: ICD-10-CM

## 2022-11-28 DIAGNOSIS — M47.816 LUMBAR FACET ARTHROPATHY: ICD-10-CM

## 2022-11-28 DIAGNOSIS — R03.0 ELEVATED BLOOD PRESSURE READING: ICD-10-CM

## 2022-11-28 DIAGNOSIS — M62.838 MUSCLE SPASM: ICD-10-CM

## 2022-11-28 DIAGNOSIS — M54.16 LEFT LUMBAR RADICULOPATHY: Primary | ICD-10-CM

## 2022-11-28 PROCEDURE — 99213 OFFICE O/P EST LOW 20 MIN: CPT | Performed by: PHYSICAL MEDICINE & REHABILITATION

## 2022-11-28 RX ORDER — VALACYCLOVIR HYDROCHLORIDE 1 G/1
TABLET, FILM COATED ORAL
COMMUNITY
Start: 2022-09-07

## 2022-11-28 RX ORDER — FAMOTIDINE 40 MG/1
TABLET, FILM COATED ORAL
COMMUNITY
Start: 2022-11-18

## 2022-11-28 RX ORDER — GABAPENTIN 100 MG/1
CAPSULE ORAL
COMMUNITY
Start: 2022-10-25

## 2022-11-28 RX ORDER — DESVENLAFAXINE 25 MG/1
TABLET, EXTENDED RELEASE ORAL
COMMUNITY
Start: 2022-10-20

## 2022-11-28 RX ORDER — IRON PS COMPLEX/B12/FOLIC ACID 150-25-1
1 CAPSULE ORAL DAILY
COMMUNITY
Start: 2022-09-29

## 2022-11-28 NOTE — LETTER
11/28/2022    Patient: Tiffanie Melgoza   YOB: 1970   Date of Visit: 11/28/2022     Zhanna Us MD  [address] [facility] [place]  [address]  [facility]    Dear Zhanna Us MD,      Thank you for referring Ms. Nicole Miramontes to [address] [place] [address] [facility] for evaluation. My notes for this consultation are attached. If you have questions, please do not hesitate to call me. I look forward to following your patient along with you.       Sincerely,    Jordy Bianchi MD

## 2022-11-28 NOTE — PATIENT INSTRUCTIONS
Learning About the [address] [facility] Diet  What is the Mediterranean diet? The Mediterranean diet is a style of eating rather than a diet plan. It features foods eaten in [place], [place], [place] and [place], and other countries along the . It emphasizes eating foods like fish, fruits, vegetables, beans, high-fiber breads and whole grains, nuts, and olive oil. This style of eating includes limited red meat, cheese, and sweets. Why choose the Mediterranean diet? A Mediterranean-style diet may improve heart health. It contains more fat than other heart-healthy diets. But the fats are mainly from nuts, unsaturated oils (such as fish oils and olive oil), and certain nut or seed oils (such as canola, soybean, or flaxseed oil). These fats may help protect the heart and blood vessels. How can you get started on the Mediterranean diet? Here are some things you can do to switch to a more Mediterranean way of eating. What to eat  Eat a variety of fruits and vegetables each day, such as grapes, blueberries, tomatoes, broccoli, peppers, figs, olives, spinach, eggplant, beans, lentils, and chickpeas. Eat a variety of whole-grain foods each day, such as oats, brown rice, and whole wheat bread, pasta, and couscous. Eat fish at least 2 times a week. Try tuna, salmon, mackerel, lake trout, herring, or sardines. Eat moderate amounts of low-fat dairy products, such as milk, cheese, or yogurt. Eat moderate amounts of poultry and eggs. Choose healthy (unsaturated) fats, such as nuts, olive oil, and certain nut or seed oils like canola, soybean, and flaxseed. Limit unhealthy (saturated) fats, such as butter, palm oil, and coconut oil. And limit fats found in animal products, such as meat and dairy products made with whole milk. Try to eat red meat only a few times a month in very small amounts. Limit sweets and desserts to only a few times a week. This includes sugar-sweetened drinks like soda.   The Mediterranean diet may also include red wine with your meal--1 glass each day for women and up to 2 glasses a day for men. Tips for eating at home  Use herbs, spices, garlic, lemon zest, and citrus juice instead of salt to add flavor to foods. Add avocado slices to your sandwich instead of guerrero. Have fish for lunch or dinner instead of red meat. Brush the fish with olive oil, and broil or grill it. Sprinkle your salad with seeds or nuts instead of cheese. Cook with olive or canola oil instead of butter or oils that are high in saturated fat. Switch from 2% milk or whole milk to 1% or fat-free milk. Dip raw vegetables in a vinaigrette dressing or hummus instead of dips made from mayonnaise or sour cream.  Have a piece of fruit for dessert instead of a piece of cake. Try baked apples, or have some dried fruit. Tips for eating out  Try broiled, grilled, baked, or poached fish instead of having it fried or breaded. Ask your  to have your meals prepared with olive oil instead of butter. Order dishes made with marinara sauce or sauces made from olive oil. Avoid sauces made from cream or mayonnaise. Choose whole-grain breads, whole wheat pasta and pizza crust, brown rice, beans, and lentils. Cut back on butter or margarine on bread. Instead, you can dip your bread in a small amount of olive oil. Ask for a side salad or grilled vegetables instead of french fries or chips. Where can you learn more? Go to http://www.hernandez.com/  Enter O407 in the search box to learn more about \"Learning About the Mediterranean Diet. \"  Current as of: May 9, 2022               Content Version: 13.4  © 1854-0998 Healthwise, [employer]. Care instructions adapted under license by [employer] (which disclaims liability or warranty for this information).  If you have questions about a medical condition or this instruction, always ask your healthcare professional. Cassia Vasquez disclaims any warranty or liability for your use of this information. DASH Diet: Care Instructions  Your Care Instructions     The DASH diet is an eating plan that can help lower your blood pressure. DASH stands for Dietary Approaches to Stop Hypertension. Hypertension is high blood pressure. The DASH diet focuses on eating foods that are high in calcium, potassium, and magnesium. These nutrients can lower blood pressure. The foods that are highest in these nutrients are fruits, vegetables, low-fat dairy products, nuts, seeds, and legumes. But taking calcium, potassium, and magnesium supplements instead of eating foods that are high in those nutrients does not have the same effect. The DASH diet also includes whole grains, fish, and poultry. The DASH diet is one of several lifestyle changes your doctor may recommend to lower your high blood pressure. Your doctor may also want you to decrease the amount of sodium in your diet. Lowering sodium while following the DASH diet can lower blood pressure even further than just the DASH diet alone. Follow-up care is a key part of your treatment and safety. Be sure to make and go to all appointments, and call your doctor if you are having problems. It's also a good idea to know your test results and keep a list of the medicines you take. How can you care for yourself at home? Following the DASH diet  Eat 4 to 5 servings of fruit each day. A serving is 1 medium-sized piece of fruit, ½ cup chopped or canned fruit, 1/4 cup dried fruit, or 4 ounces (½ cup) of fruit juice. Choose fruit more often than fruit juice. Eat 4 to 5 servings of vegetables each day. A serving is 1 cup of lettuce or raw leafy vegetables, ½ cup of chopped or cooked vegetables, or 4 ounces (½ cup) of vegetable juice. Choose vegetables more often than vegetable juice. Get 2 to 3 servings of low-fat and fat-free dairy each day.  A serving is 8 ounces of milk, 1 cup of yogurt, or 1 ½ ounces of cheese. Eat 6 to 8 servings of grains each day. A serving is 1 slice of bread, 1 ounce of dry cereal, or ½ cup of cooked rice, pasta, or cooked cereal. Try to choose whole-grain products as much as possible. Limit lean meat, poultry, and fish to 2 servings each day. A serving is 3 ounces, about the size of a deck of cards. Eat 4 to 5 servings of nuts, seeds, and legumes (cooked dried beans, lentils, and split peas) each week. A serving is 1/3 cup of nuts, 2 tablespoons of seeds, or ½ cup of cooked beans or peas. Limit fats and oils to 2 to 3 servings each day. A serving is 1 teaspoon of vegetable oil or 2 tablespoons of salad dressing. Limit sweets and added sugars to 5 servings or less a week. A serving is 1 tablespoon jelly or jam, ½ cup sorbet, or 1 cup of lemonade. Eat less than 2,300 milligrams (mg) of sodium a day. If you limit your sodium to 1,500 mg a day, you can lower your blood pressure even more. Be aware that all of these are the suggested number of servings for people who eat 1,800 to 2,000 calories a day. Your recommended number of servings may be different if you need more or fewer calories. Tips for success  Start small. Do not try to make dramatic changes to your diet all at once. You might feel that you are missing out on your favorite foods and then be more likely to not follow the plan. Make small changes, and stick with them. Once those changes become habit, add a few more changes. Try some of the following:  Make it a goal to eat a fruit or vegetable at every meal and at snacks. This will make it easy to get the recommended amount of fruits and vegetables each day. Try yogurt topped with fruit and nuts for a snack or healthy dessert. Add lettuce, tomato, cucumber, and onion to sandwiches. Combine a ready-made pizza crust with low-fat mozzarella cheese and lots of vegetable toppings. Try using tomatoes, squash, spinach, broccoli, carrots, cauliflower, and onions.   Have a variety of cut-up vegetables with a low-fat dip as an appetizer instead of chips and dip. Sprinkle sunflower seeds or chopped almonds over salads. Or try adding chopped walnuts or almonds to cooked vegetables. Try some vegetarian meals using beans and peas. Add garbanzo or kidney beans to salads. Make burritos and tacos with mashed waite beans or black beans. Where can you learn more? Go to http://www.hernandez.com/  Enter H967 in the search box to learn more about \"DASH Diet: Care Instructions. \"  Current as of: January 10, 2022               Content Version: 13.4  © 2006-2022 Healthwise, [employer]. Care instructions adapted under license by [employer] (which disclaims liability or warranty for this information). If you have questions about a medical condition or this instruction, always ask your healthcare professional. [address] any warranty or liability for your use of this information.

## 2022-11-28 NOTE — PROGRESS NOTES
[facility]  Ana Marai [address] [place], [facility] [address]  Phone: [phone]  Fax: [phone]    Pt's YOB: 1970    ASSESSMENT   Diagnoses and all orders for this visit:    1. Left lumbar radiculopathy  -     MRI LUMB SPINE WO CONT; Future    2. Lumbar facet arthropathy  -     MRI LUMB SPINE WO CONT; Future    3. DDD (degenerative disc disease), lumbar  -     MRI LUMB SPINE WO CONT; Future    4. Muscle spasm  -     MRI LUMB SPINE WO CONT; Future    5. Elevated blood pressure reading       IMPRESSION AND PLAN:  Aaron Lala is a 45 y.o. female with history of lumbar and cervical pain and presents to the office today for follow up, she was last seen by Shasta Esteban NP on 09/27/2022 in the office. Pt complains of a new onset of aching and stabbing pain in the left lumbar region radiating into the upper anterior aspect of the left thigh, relieved with sitting. She is taking Neurontin 100 mg 2 caps QHS with sedation, secondary to restless leg syndrome. 1) Pt was given information on the DASH diet and Mediterranean diet. 2) A lumbar spine MRI was ordered to assess for lumbar radiculopathy. 3) Lumbar 2V x-rays from 09/27/2022 were reviewed with the patient at today's office visit. 4) Ms. Angelina Molina has a reminder for a \"due or due soon\" health maintenance. I have asked that she contact her primary care provider, Anabell Parsons MD, for follow-up on this health maintenance. 5)  demonstrated consistency with prescribing. 6) Pt would like to defer from NSAID's due to hx of migraines and rebound headaches. 7) Pt was advised to discuss vitamin D3 and zinc supplementation with her PCP. Follow-up and Dispositions    Return in about 5 weeks (around 1/2/2023) for Diagnostic Test follow up.              HISTORY OF PRESENT ILLNESS:  Aaron Lala is a 45 y.o. female with history of lumbar and cervical pain and presents to the office today for follow up, she was last seen by Cecilio Harmon NP on 09/27/2022 in the office. Pt complains of a new onset of aching and stabbing pain in the left lumbar region radiating into the upper anterior aspect of the left thigh, relieved with sitting. She reports episodes of lumbar pain previously that would usually resolve after a few weeks but the pain in her left lumbar region has not resided. Pt was previously seen by me on 11/12/2019 where she was prescribed Lyrica and was being treated for her cervical pain and decreased range of motion which she still has minimal issues with. Pt notes she would like to steer away from NSAID's and pain medications secondary to migraines and worries of rebound headaches. She is taking Neurontin 100 mg 2 caps QHS with sedation, secondary to restless leg syndrome, and has previously had physical therapy without significant benefit. Labs from 08/04/2022 were reviewed and demonstrated a vitamin D level of 67.9 ng/mL. Pt at this time desires to proceed with a lumbar spine MRI. Pain Scale: 1/10    PCP: Suzan Lyles MD     Past Medical History:   Diagnosis Date    Alcohol abuse     Anemia, iron deficiency 08/2021    saw Dr Court Brar years Dr. Zac Amor;   Bhat-Bautista 2011(?); then llumb    Constipation     COVID-19 virus infection 12/2021    regeneron infusion    Degenerative disc disease, cervical 2009    C5-6 Dr Durand Doctor    Fibromyalgia 2019    Dr Jarrell Gamma    Gastritis 12/2012    Dr Malgorzata Moody 10/21    GERD (gastroesophageal reflux disease) 2009    Haven Morales, last EGD 12/12    H/O bone graft 05/22/2017    Dental bone graft for dental implant    Hydronephrosis of right kidney     Dr Ledezma Emhouse    Hypertension     Hypothyroidism     Dr Gladys Link     Interstitial cystitis     possible per gyn    Lumbar degenerative disc disease 01/2014    L4-5 mild degen disc w mild central stenosis    Migraine     Dr. Durand Doctor, saw Dr. Deandre Tavarez also; Dr Jen Wharton the Dr Elodia Pichardo for botox; now aimovig    Obesity (BMI 30-39.9) 2018    HERMES (obstructive sleep apnea) ?     on cpap although she's not using Dr. Madeleine De La Garza    HERMES (obstructive sleep apnea)     Dr Sindy White; home sleep test showed AHI 3 but possibly inaccurate due to tech issues; RLS    Osteoarthritis, knee     Dr. Edgard Murguia    Osteopenia  FRAX 3.0 and 0.3      DEXA t score 0.4 spine, -1.6 hip (); -0.3 spine, -1.5 hip w FRAX 3.4/0.3 (4/15); w/u neg for secondary causes    PPD positive, treated     RLS (restless legs syndrome) 10/2022    Dr Sindy White; neurontin    Thyroid nodule     Dr Mariano Brown; left 5mm ; no change , , ;  4mm [facility]    Venous insufficiency 2017        Social History     Socioeconomic History    Marital status:      Spouse name: Not on file    Number of children: 2    Years of education: Not on file    Highest education level: Not on file   Occupational History    Occupation: RN currently housewife     Employer: not employed   Tobacco Use    Smoking status: Former     Packs/day: 1.00     Years: 3.00     Pack years: 3.00     Types: Cigarettes     Quit date: 1987     Years since quittin.9    Smokeless tobacco: Never   Substance and Sexual Activity    Alcohol use: Not Currently     Comment: abuse-has not drank in four years    Drug use: No     Types: Marijuana     Comment: Former use a long time ago    Sexual activity: Not on file   Other Topics Concern    Not on file   Social History Narrative    Not on file     Social Determinants of Health     Financial Resource Strain: Not on file   Food Insecurity: Not on file   Transportation Needs: Not on file   Physical Activity: Not on file   Stress: Not on file   Social Connections: Not on file   Intimate Partner Violence: Not on file   Housing Stability: Not on file       Current Outpatient Medications   Medication Sig Dispense Refill    desvenlafaxine succinate (PRISTIQ) 25 mg ER tablet TAKE 1 TABLET BY MOUTH IN THE MORNING FOR 2 WEEKS famotidine (PEPCID) 40 mg tablet       gabapentin (NEURONTIN) 100 mg capsule TAKE 2 CAPSULES BY MOUTH EVERY NIGHT 1 HOUR BEFORE BEDTIME      Poly-Iron 150 Forte capsule Take 1 Capsule by mouth daily. valACYclovir (VALTREX) 1 gram tablet TAKE 1 TABLET BY MOUTH EVERY DAY AS DIRECTED      semaglutide (Ozempic) 0.25 mg or 0.5 mg/dose (2 mg/1.5 ml) subq pen 0.5 mg by SubCUTAneous route every seven (7) days. 1.5 mL 0    losartan (COZAAR) 25 mg tablet Take 1 Tablet by mouth in the morning. (Patient taking differently: Take 50 mg by mouth daily.) 90 Tablet 3    nadoloL (CORGARD) 40 mg tablet Take 40 mg by mouth nightly. Trulance 3 mg tab TAKE 1 TABLET BY MOUTH DAILY WITH OR WITHOUT FOOD FOR CONSTIPATION      [place] Thyroid 60 mg tablet TAKE 1 TABLET BY MOUTH EVERY MORNING WITH 15 MG      [place] Thyroid 15 mg tablet TAKE 1 TABLET BY MOUTH EVERY MORNING WITH 60 MG      ascorbic acid, vitamin C, (VITAMIN C) 250 mg tablet Take 1,600 mg by mouth daily. COLLAGEN by Does Not Apply route. fluticasone (Flonase Sensimist) 27.5 mcg/actuation nasal spray 1 [place] by Both Nostrils route daily. AIMOVIG AUTOINJECTOR 70 mg/mL injection 1 mL by SubCUTAneous route every thirty (30) days. progesterone (PROMETRIUM) 200 mg capsule 400 mg.  3    rizatriptan (MAXALT) 10 mg tablet TAKE 1 TABLET BY MOUTH 1 TIME AS NEEDED FOR MIGRAINE. MAY REPEAT IN 2 HOURS AS NEEDED 12 Tab 5    acetaminophen (TYLENOL) 325 mg tablet Take  by mouth every four (4) hours as needed for Pain. ibuprofen (MOTRIN) 200 mg tablet Take 600 mg by mouth every eight (8) hours as needed. estradioL (VIVELLE) 0.0375 mg/24 hr 1 Patch by TransDERmal route two (2) times a week on Wednesday and Saturday. melatonin 3 mg tablet Take 5 mg by mouth nightly. docusate sodium (COLACE) 100 mg capsule Take 1,600 mg by mouth two (2) times a day.  6 in the am and 10 at night      cholecalciferol (VITAMIN D3) (2,000 UNITS /50 MCG) cap capsule Take 1,000 Units by mouth daily. methylPREDNISolone (MEDROL DOSEPACK) 4 mg tablet Take 1 Tablet by mouth Specific Days and Specific Times. Use as directed per package (Patient not taking: No sig reported) 1 Dose Pack 0    econazole nitrate (SPECTAZOLE) 1 % topical cream Apply  to affected area two (2) times a day. (Patient not taking: Reported on 11/28/2022) 15 g 0    topiramate (TOPAMAX) 200 mg tablet Take 200 mg by mouth two (2) times a day. (Patient not taking: No sig reported)      SUMAtriptan (IMITREX) 100 mg tablet TAKE 1 TABLET BY MOUTH AT ONSET OF MIGRAINE      prednisoLONE acetate (PRED FORTE) 1 % ophthalmic suspension INSTILL 1 DROP IN THE LEFT EYE FOUR TIMES DAILY AFTER CATARACT SURGERY AS DIRECTED. SHAKE WELL (Patient not taking: Reported on 11/28/2022)      DULoxetine 60 mg CDRS TAKE 2 CAPSULES BY MOUTH EVERY MORNING AND 1 CAPSULE EVERY EVENING (Patient not taking: Reported on 11/28/2022)      albuterol (PROVENTIL HFA, VENTOLIN HFA, PROAIR HFA) 90 mcg/actuation inhaler INHALE 2 PUFFS BY MOUTH EVERY 6 HOURS AS NEEDED FOR WHEEZING (Patient not taking: Reported on 11/28/2022) 54 g 3    Dexilant 60 mg CpDB capsule (delayed release) TAKE 1 CAPSULE BY MOUTH DAILY (Patient not taking: Reported on 11/28/2022) 90 Cap 3    sucralfate (Carafate) 1 gram tablet Take 1 Tab by mouth four (4) times daily. (Patient not taking: Reported on 11/28/2022) 28 Tab 0    omeprazole (PRILOSEC OTC) 20 mg tablet Take 20 mg by mouth daily. (Patient not taking: Reported on 11/28/2022)      potassium chloride (K-DUR, KLOR-CON) 20 mEq tablet Take once daily as needed w furosemide (Patient not taking: Reported on 11/28/2022) 30 Tab 6    furosemide (LASIX) 20 mg tablet Take once daily as needed for edema 30 Tab 6       Allergies   Allergen Reactions    Tetracyclines Nausea and Vomiting         REVIEW OF SYSTEMS    Constitutional: Negative for fever, chills, or weight change. Respiratory: Negative for cough or shortness of breath. Cardiovascular: Negative for chest pain or palpitations. Gastrointestinal: Negative for acid reflux, change in bowel habits, or constipation. Genitourinary: Negative for dysuria and flank pain. Musculoskeletal: Positive for lumbar and left leg pain. Skin: Negative for rash. Neurological: Negative for headaches, dizziness, or numbness. Endo/Heme/Allergies: Negative for increased bruising. Psychiatric/Behavioral: Negative for difficulty with sleep. As per HPI    PHYSICAL EXAMINATION  Visit Vitals  Pulse 68   Temp 97.2 °F (36.2 °C) (Temporal)   Ht 5' 5\" (1.651 m)   Wt 207 lb (93.9 kg)   SpO2 99%   BMI 34.45 kg/m²       Constitutional: Awake, alert, and in no acute distress. Neurological: Sensation to light touch is intact. Negative Blanco's sign bilaterally. Skin: warm, dry, and intact. Musculoskeletal: Mild - moderate pain with extension or axial loading. Improvement with forward flexion. No pain with internal or external rotation of her hips. Negative straight leg raise bilaterally. Biceps  Triceps Deltoids Wrist Ext Wrist Flex Hand Intrin   Right +4/5 +4/5 +4/5 +4/5 +4/5 +4/5   Left +4/5 +4/5 +4/5 +4/5 +4/5 +4/5      Hip Flex  Quads Hamstrings Ankle DF EHL Ankle PF   Right +4/5 +4/5 +4/5 +4/5 +4/5 +4/5   Left +4/5 +4/5 +4/5 +4/5 +4/5 +4/5     IMAGING:  Lumbar x-rays 2V 09/27/2022 were personally reviewed with pt and demonstrated:  Multi-level degenerative facets, mild ddd L5/S1    AP Pelvis X-rays from 11/12/2019 were personally reviewed with the patient and demonstrated:   IUD is present, no significant degenerative changes. Cervical spine 2V x-rays from 12/17/2018 were personally reviewed with the patient and demonstrated:  Straightening of the cervical spine. Mild degenerative facets in the lower cervical region.       Cervical MRI from 09/26/2013 was reviewed and demonstrated:  MRI of cervical spine without contrast     CPT CODE: 60751     HISTORY: Pain, radiculopathy     COMPARISON: None     FINDINGS:     The visualized posterior fossa contents look normal. The prevertebral soft  tissues look normal.     The cervical cord is normal in signal and caliber. There is mild reversal of the normal cervical lordosis. No significant  listhesis. Vertebral body heights are maintained. No pathologic signal  abnormality within the bone marrow. There is mild edema in the posterior soft  tissues in the region of the upper cervical spine, reference sagittal inversion  recovery image  7. Disc heights are preserved. C1-2: Normal     C2-3: Normal     C3-4: Normal     C4-5: Minimal posterior disc bulge. No significant foraminal or canal stenosis. C5-6: Mild uncovertebral and facet arthropathy, without significant canal or  foraminal stenosis. C6-7: Minimal posterior disc bulge. Mild uncovertebral and facet arthropathy. Posterior ligamentous thickening. Canal remains patent. Mild left foraminal  narrowing. C7-T1: Normal     IMPRESSION:  Mild multilevel degenerative changes, without critical canal stenosis. Mild  left foraminal narrowing at C6-7. Mild edema in the posterior soft tissues of the upper cervical spine may  represent mild soft tissue injury. No evidence of ligamentous trauma. Written by Meribeth Schilder, as dictated by Dayton Cheema MD.  I, Dr. Dayton Cheema confirm that all documentation is accurate.

## 2022-11-28 NOTE — PROGRESS NOTES
Josephus Kussmaul presents today for   Chief Complaint   Patient presents with    Back Pain       Is someone accompanying this pt? no    Is the patient using any DME equipment during OV? no    Depression Screening:  3 most recent PHQ Screens 5/6/2022   PHQ Not Done -   Little interest or pleasure in doing things Not at all   Feeling down, depressed, irritable, or hopeless Not at all   Total Score PHQ 2 0       Learning Assessment:  Learning Assessment 3/17/2016   PRIMARY LEARNER Patient   HIGHEST LEVEL OF EDUCATION - PRIMARY LEARNER  -   BARRIERS PRIMARY LEARNER -   CO-LEARNER CAREGIVER -   PRIMARY LANGUAGE ENGLISH   LEARNER PREFERENCE PRIMARY OTHER (COMMENT)   ANSWERED BY patient   RELATIONSHIP SELF       Abuse Screening:  Abuse Screening Questionnaire 5/6/2022   Do you ever feel afraid of your partner? N   Are you in a relationship with someone who physically or mentally threatens you? N   Is it safe for you to go home? Y       Fall Risk  No flowsheet data found. OPIOID RISK TOOL  No flowsheet data found. Coordination of Care:  1. Have you been to the ER, urgent care clinic since your last visit? no  Hospitalized since your last visit? no    2. Have you seen or consulted any other health care providers outside of the [address] [place] [address] since your last visit? no Include any pap smears or colon screening.  no Paramedian Forehead Flap Text: A decision was made to reconstruct the defect utilizing an interpolation axial flap and a staged reconstruction.  A telfa template was made of the defect.  This telfa template was then used to outline the paramedian forehead pedicle flap.  The donor area for the pedicle flap was then injected with anesthesia.  The flap was excised through the skin and subcutaneous tissue down to the layer of the underlying musculature.  The pedicle flap was carefully excised within this deep plane to maintain its blood supply.  The edges of the donor site were undermined.   The donor site was closed in a primary fashion.  The pedicle was then rotated into position and sutured.  Once the tube was sutured into place, adequate blood supply was confirmed with blanching and refill.  The pedicle was then wrapped with xeroform gauze and dressed appropriately with a telfa and gauze bandage to ensure continued blood supply and protect the attached pedicle.

## 2022-11-29 DIAGNOSIS — I10 PRIMARY HYPERTENSION: ICD-10-CM

## 2022-11-29 NOTE — TELEPHONE ENCOUNTER
Requested Prescriptions     Pending Prescriptions Disp Refills    losartan (COZAAR) 25 mg tablet 90 Tablet      Sig: Take 2 Tablets by mouth daily. Per pt Dr Rose Miller said he would do refills for this medicine. Stated Dr Gonsalo Elias changed the RX to 50 mg take 1 tab daily. She has been on that for awhile    Please send to [facility].

## 2022-11-30 RX ORDER — LOSARTAN POTASSIUM 25 MG/1
50 TABLET ORAL DAILY
Qty: 90 TABLET | OUTPATIENT
Start: 2022-11-30

## 2022-11-30 RX ORDER — LOSARTAN POTASSIUM 50 MG/1
50 TABLET ORAL DAILY
Qty: 90 TABLET | Refills: 3 | Status: SHIPPED | OUTPATIENT
Start: 2022-11-30

## 2022-12-05 DIAGNOSIS — M62.838 MUSCLE SPASM: ICD-10-CM

## 2022-12-05 DIAGNOSIS — M51.36 DDD (DEGENERATIVE DISC DISEASE), LUMBAR: ICD-10-CM

## 2022-12-05 DIAGNOSIS — M47.816 LUMBAR FACET ARTHROPATHY: ICD-10-CM

## 2022-12-05 DIAGNOSIS — M54.16 LEFT LUMBAR RADICULOPATHY: ICD-10-CM

## 2022-12-09 NOTE — PROGRESS NOTES
Thao Bird presents today for   Chief Complaint   Patient presents with    Follow-up    Hypertension     Primary    Hypothyroidism     1. \"Have you been to the ER, urgent care clinic since your last visit? Hospitalized since your last visit? \" no    2. \"Have you seen or consulted any other health care providers outside of the [address] [place] [address] since your last visit? \" yes     3. For patients aged 39-70: Has the patient had a colonoscopy / FIT/ Cologuard? Yes - no Care Gap present      If the patient is female:    4. For patients aged 41-77: Has the patient had a mammogram within the past 2 years? Yes - no Care Gap present  See top three    5. For patients aged 21-65: Has the patient had a pap smear?  Yes - no Care Gap present

## 2022-12-13 ENCOUNTER — OFFICE VISIT (OUTPATIENT)
Dept: INTERNAL MEDICINE CLINIC | Age: 52
End: 2022-12-13
Payer: COMMERCIAL

## 2022-12-13 VITALS
SYSTOLIC BLOOD PRESSURE: 136 MMHG | OXYGEN SATURATION: 100 % | DIASTOLIC BLOOD PRESSURE: 81 MMHG | WEIGHT: 199 LBS | BODY MASS INDEX: 33.15 KG/M2 | RESPIRATION RATE: 19 BRPM | HEIGHT: 65 IN | TEMPERATURE: 98.2 F | HEART RATE: 63 BPM

## 2022-12-13 DIAGNOSIS — I10 PRIMARY HYPERTENSION: ICD-10-CM

## 2022-12-13 DIAGNOSIS — E66.9 OBESITY (BMI 30-39.9): Primary | ICD-10-CM

## 2022-12-13 DIAGNOSIS — G43.009 MIGRAINE WITHOUT AURA AND WITHOUT STATUS MIGRAINOSUS, NOT INTRACTABLE: ICD-10-CM

## 2022-12-13 PROBLEM — E11.9 TYPE 2 DIABETES MELLITUS (HCC): Status: RESOLVED | Noted: 2022-09-27 | Resolved: 2022-12-13

## 2022-12-13 PROBLEM — R73.01 IFG (IMPAIRED FASTING GLUCOSE): Status: ACTIVE | Noted: 2022-12-13

## 2022-12-13 PROCEDURE — 3074F SYST BP LT 130 MM HG: CPT | Performed by: INTERNAL MEDICINE

## 2022-12-13 PROCEDURE — 99214 OFFICE O/P EST MOD 30 MIN: CPT | Performed by: INTERNAL MEDICINE

## 2022-12-13 PROCEDURE — 3078F DIAST BP <80 MM HG: CPT | Performed by: INTERNAL MEDICINE

## 2022-12-13 RX ORDER — LOSARTAN POTASSIUM 100 MG/1
100 TABLET ORAL DAILY
Qty: 90 TABLET | Refills: 3 | Status: SHIPPED | OUTPATIENT
Start: 2022-12-13

## 2022-12-13 NOTE — PROGRESS NOTES
45 y.o. female who presents for evaluation. She has noted her blood pressure to be elevated the last few times she's checked it. Running 130-160 over  even at other doctor's offices. Remains on losartan 50 and nadolol 40 for migraine. No cardiovascular complaints. Under a lot of stress with family life. Secondly, she is tolerating the Ozempic fairly well. Interested in increasing the dose for weight loss at this point. She's holding the aimovig due to concerns about the bp    Past Medical History:   Diagnosis Date    Alcohol abuse     Anemia, iron deficiency 08/2021    saw Dr Jose L Renteria years Dr. Rafael Leblanc; Bhat-Bautista 2011(?); then llumb    Constipation     COVID-19 virus infection     regeneron (12/21); paxlovid (6/22)    Degenerative disc disease, cervical 2009    C5-6 Dr Betzaida Moser 2019    Dr Cinthia Nowak    Gastritis 12/2012    Dr Gina Johns 10/21    GERD (gastroesophageal reflux disease) 2009    Massimo Gerber, last EGD 12/12    H/O bone graft 05/22/2017    Dental bone graft for dental implant    Hydronephrosis of right kidney     Dr Arsalan Khan    Hypertension     Hypothyroidism     Dr Lori May     IFG (impaired fasting glucose) 12/2020    Interstitial cystitis     possible per gyn    Lumbar degenerative disc disease 01/2014    L4-5 mild degen disc w mild central stenosis    Migraine     Dr. Palma Arora, saw Dr. Steven Schroeder also; Dr Douglas Lawson the Dr Drake Frank for botox; now aimovig    Obesity (BMI 30-39.9) 03/23/2018    HERMES (obstructive sleep apnea) 2011?     on cpap although she's not using Dr. Palma Arora    HERMES (obstructive sleep apnea)     Dr Anel Zhang; home sleep test showed AHI 3 but possibly inaccurate due to tech issues; RLS    Osteoarthritis, knee     Dr. Lona Ma    Osteopenia 2/13 FRAX 3.0 and 0.3      DEXA t score 0.4 spine, -1.6 hip (2/13); -0.3 spine, -1.5 hip w FRAX 3.4/0.3 (4/15); w/u neg for secondary causes    PPD positive, treated 1980    RLS (restless legs syndrome) 10/2022    Dr Anel Zhang; neurontin    Thyroid nodule     Dr Sonya Frost; left 5mm 2014; no change 2015, 2016, 8/17; 1/20 4mm [facility]    Venous insufficiency 07/2017     Current Outpatient Medications   Medication Sig    desvenlafaxine succinate (PRISTIQ) 25 mg ER tablet TAKE 1 TABLET BY MOUTH IN THE MORNING FOR 2 WEEKS    famotidine (PEPCID) 40 mg tablet     gabapentin (NEURONTIN) 100 mg capsule TAKE 2 CAPSULES BY MOUTH EVERY NIGHT 1 HOUR BEFORE BEDTIME    Poly-Iron 150 Forte capsule Take 1 Capsule by mouth daily. valACYclovir (VALTREX) 1 gram tablet TAKE 1 TABLET BY MOUTH EVERY DAY AS DIRECTED    nadoloL (CORGARD) 40 mg tablet Take 40 mg by mouth nightly. Trulance 3 mg tab TAKE 1 TABLET BY MOUTH DAILY WITH OR WITHOUT FOOD FOR CONSTIPATION    [place] Thyroid 60 mg tablet TAKE 1 TABLET BY MOUTH EVERY MORNING WITH 15 MG    [place] Thyroid 15 mg tablet TAKE 1 TABLET BY MOUTH EVERY MORNING WITH 60 MG    SUMAtriptan (IMITREX) 100 mg tablet TAKE 1 TABLET BY MOUTH AT ONSET OF MIGRAINE    ascorbic acid, vitamin C, (VITAMIN C) 250 mg tablet Take 1,600 mg by mouth daily. COLLAGEN by Does Not Apply route. fluticasone (Flonase Sensimist) 27.5 mcg/actuation nasal spray 1 [place] by Both Nostrils route daily. AIMOVIG AUTOINJECTOR 70 mg/mL injection 1 mL by SubCUTAneous route every thirty (30) days. furosemide (LASIX) 20 mg tablet Take once daily as needed for edema    progesterone (PROMETRIUM) 200 mg capsule 400 mg.    rizatriptan (MAXALT) 10 mg tablet TAKE 1 TABLET BY MOUTH 1 TIME AS NEEDED FOR MIGRAINE. MAY REPEAT IN 2 HOURS AS NEEDED    acetaminophen (TYLENOL) 325 mg tablet Take  by mouth every four (4) hours as needed for Pain. ibuprofen (MOTRIN) 200 mg tablet Take 600 mg by mouth every eight (8) hours as needed. estradioL (VIVELLE) 0.0375 mg/24 hr 1 Patch by TransDERmal route two (2) times a week on Wednesday and Saturday. melatonin 3 mg tablet Take 5 mg by mouth nightly.     docusate sodium (COLACE) 100 mg capsule Take 1,600 mg by mouth two (2) times a day. 6 in the am and 10 at night    cholecalciferol (VITAMIN D3) (2,000 UNITS /50 MCG) cap capsule Take 1,000 Units by mouth daily. losartan (COZAAR) 100 mg tablet Take 1 Tablet by mouth daily. semaglutide (OZEMPIC) 1 mg/dose (2 mg/1.5 mL) sub-q pen 1 mg by SubCUTAneous route every seven (7) days. DULoxetine 60 mg CDRS TAKE 2 CAPSULES BY MOUTH EVERY MORNING AND 1 CAPSULE EVERY EVENING (Patient not taking: No sig reported)     No current facility-administered medications for this visit. Allergies   Allergen Reactions    Tetracyclines Nausea and Vomiting     Visit Vitals  /81   Pulse 63   Temp 98.2 °F (36.8 °C) (Temporal)   Resp 19   Ht 5' 5\" (1.651 m)   Wt 199 lb (90.3 kg)   SpO2 100%   BMI 33.12 kg/m²   A&O x3  Affect is appropriate. Mood stable  No apparent distress  Anicteric, no JVD, adenopathy or thyromegaly. No carotid bruits or radiated murmur  Lungs clear to auscultation, no wheezes or rales  Heart showed regular rate and rhythm. No murmur, rubs, gallops  Abdomen soft nontender, no hepatosplenomegaly or masses. Extremities without edema. Pulses 1-2+ symmetrically    Assessment and plan:  1. Hypertension. Increase losartan to 100, call in readings in 2 weeks or so  2. Obesity. Increase ozempic to 1 mg and call update 1 mo  3. Migraine. Restart aimovig once bp better controlled      Instructions above were handwritten on the lab results sheet that I gave the patient today    Above conditions discussed at length and patient vocalized understanding.   All questions answered to patient satisfaction

## 2022-12-15 ENCOUNTER — OFFICE VISIT (OUTPATIENT)
Dept: ORTHOPEDIC SURGERY | Age: 52
End: 2022-12-15
Payer: COMMERCIAL

## 2022-12-15 VITALS — RESPIRATION RATE: 14 BRPM | HEIGHT: 65 IN | WEIGHT: 203 LBS | BODY MASS INDEX: 33.82 KG/M2

## 2022-12-15 DIAGNOSIS — S93.601A FOOT SPRAIN, RIGHT, INITIAL ENCOUNTER: Primary | ICD-10-CM

## 2022-12-15 RX ORDER — DICLOFENAC SODIUM 10 MG/G
2 GEL TOPICAL
Qty: 100 G | Refills: 0 | Status: SHIPPED | OUTPATIENT
Start: 2022-12-15

## 2022-12-15 NOTE — PROGRESS NOTES
Pt states that she went to [facility] for the injury. Pt states that she had xrays done at [facility].

## 2022-12-15 NOTE — LETTER
12/15/2022    Patient: Aaron Lala   YOB: 1970   Date of Visit: 12/15/2022     Brittney Ferro MD  [address] [facility] [place]  [address]  [place]    Dear Brittney Ferro MD,      Thank you for referring Ms. Rakan Johns to [place] [address]. for evaluation. My notes for this consultation are attached. If you have questions, please do not hesitate to call me. I look forward to following your patient along with you.       Sincerely,    Fabienne Orozco, DO

## 2022-12-15 NOTE — PROGRESS NOTES
HISTORY OF PRESENT ILLNESS    Michelle Gonzalez 1970 is a 45y.o. year old female comes in today as new patient for: right foot pain    Patients symptoms have been present for 6 days. Pain level 7/10 top/outside. It has worsened with walking, pressure. Patient has tried:  ER visit and had XR and given stiff shoe and adivsed use motrin OTC with benefit. It is described as pain in foot after tripped over dog bone at home. IMAGING: XR right foot 2022 images reviewed and agree with:  1. No significant abnormality. Past Surgical History:   Procedure Laterality Date    HX ABDOMINOPLASTY      and mastopexy, Dr. Marcus Schooling  [id number]    silicone Dr Danny Randhawa      Dr. Ugalde Slot  [id number]    HX COLONOSCOPY      Dr Ani Mclaughlin  negative    HX GI      EGD w gastritis, h pylori neg Dr. Oliver Burdick      EMG negative Dr. Oliver Burdick      MRI head negative    [place] RENAL SCOPE,ENDOPYELOTOMY      In [place] after right hydronephrosis     Social History     Socioeconomic History    Marital status:     Number of children: 2   Occupational History    Occupation: RN currently housewife     Employer: not employed   Tobacco Use    Smoking status: Former     Packs/day: 1.00     Years: 3.00     Pack years: 3.00     Types: Cigarettes     Quit date: 1987     Years since quittin.9    Smokeless tobacco: Never   Substance and Sexual Activity    Alcohol use: Not Currently     Comment: abuse-has not drank in four years    Drug use: No     Types: Marijuana     Comment: Former use a long time ago      Current Outpatient Medications   Medication Sig Dispense Refill    losartan (COZAAR) 100 mg tablet Take 1 Tablet by mouth daily. 90 Tablet 3    semaglutide (OZEMPIC) 1 mg/dose (2 mg/1.5 mL) sub-q pen 1 mg by SubCUTAneous route every seven (7) days.  1 Box 1    desvenlafaxine succinate (PRISTIQ) 25 mg ER tablet TAKE 1 TABLET BY MOUTH IN THE MORNING FOR 2 WEEKS      famotidine (PEPCID) 40 mg tablet       gabapentin (NEURONTIN) 100 mg capsule TAKE 2 CAPSULES BY MOUTH EVERY NIGHT 1 HOUR BEFORE BEDTIME      Poly-Iron 150 Forte capsule Take 1 Capsule by mouth daily. valACYclovir (VALTREX) 1 gram tablet TAKE 1 TABLET BY MOUTH EVERY DAY AS DIRECTED      nadoloL (CORGARD) 40 mg tablet Take 40 mg by mouth nightly. Trulance 3 mg tab TAKE 1 TABLET BY MOUTH DAILY WITH OR WITHOUT FOOD FOR CONSTIPATION      [place] Thyroid 60 mg tablet TAKE 1 TABLET BY MOUTH EVERY MORNING WITH 15 MG      [place] Thyroid 15 mg tablet TAKE 1 TABLET BY MOUTH EVERY MORNING WITH 60 MG      SUMAtriptan (IMITREX) 100 mg tablet TAKE 1 TABLET BY MOUTH AT ONSET OF MIGRAINE      ascorbic acid, vitamin C, (VITAMIN C) 250 mg tablet Take 1,600 mg by mouth daily. COLLAGEN by Does Not Apply route. fluticasone (Flonase Sensimist) 27.5 mcg/actuation nasal spray 1 [place] by Both Nostrils route daily. AIMOVIG AUTOINJECTOR 70 mg/mL injection 1 mL by SubCUTAneous route every thirty (30) days. furosemide (LASIX) 20 mg tablet Take once daily as needed for edema 30 Tab 6    progesterone (PROMETRIUM) 200 mg capsule 400 mg.  3    rizatriptan (MAXALT) 10 mg tablet TAKE 1 TABLET BY MOUTH 1 TIME AS NEEDED FOR MIGRAINE. MAY REPEAT IN 2 HOURS AS NEEDED 12 Tab 5    acetaminophen (TYLENOL) 325 mg tablet Take  by mouth every four (4) hours as needed for Pain. ibuprofen (MOTRIN) 200 mg tablet Take 600 mg by mouth every eight (8) hours as needed. estradioL (VIVELLE) 0.0375 mg/24 hr 1 Patch by TransDERmal route two (2) times a week on Wednesday and Saturday. melatonin 3 mg tablet Take 5 mg by mouth nightly. docusate sodium (COLACE) 100 mg capsule Take 1,600 mg by mouth two (2) times a day. 6 in the am and 10 at night      cholecalciferol (VITAMIN D3) (2,000 UNITS /50 MCG) cap capsule Take 1,000 Units by mouth daily. DULoxetine 60 mg CDRS TAKE 2 CAPSULES BY MOUTH EVERY MORNING AND 1 CAPSULE EVERY EVENING (Patient not taking: No sig reported)       Past Medical History:   Diagnosis Date    Alcohol abuse     Anemia, iron deficiency 08/2021    saw Dr Prateek Tryalor years Dr. Magda Deleon; Bhat-Michele 2011(?); then llumb    Constipation     COVID-19 virus infection     regeneron (12/21); paxlovid (6/22)    Degenerative disc disease, cervical 2009    C5-6 Dr Mariel Aguilar 2019    Dr Avis Driscoll    Gastritis 12/2012    Dr Donovan Garduno 10/21    GERD (gastroesophageal reflux disease) 2009    Miguel Ken, last EGD 12/12    H/O bone graft 05/22/2017    Dental bone graft for dental implant    Hydronephrosis of right kidney     Dr Nithin Quinn    Hypertension     Hypothyroidism     Dr Bryant Hickey     IFG (impaired fasting glucose) 12/2020    Interstitial cystitis     possible per gyn    Lumbar degenerative disc disease 01/2014    L4-5 mild degen disc w mild central stenosis    Migraine     Dr. Beau Lynch, saw Dr. Layla Monaco also; Dr Anabela aBxter the Dr Cordero Fowler for botox; now aimovig    Obesity (BMI 30-39.9) 03/23/2018    HERMES (obstructive sleep apnea) 2011?     on cpap although she's not using Dr. Beau Lynch    HERMES (obstructive sleep apnea)     Dr Lori Mo; home sleep test showed AHI 3 but possibly inaccurate due to tech issues; RLS    Osteoarthritis, knee     Dr. Rasheed Lyme    Osteopenia 2/13 FRAX 3.0 and 0.3      DEXA t score 0.4 spine, -1.6 hip (2/13); -0.3 spine, -1.5 hip w FRAX 3.4/0.3 (4/15); w/u neg for secondary causes    PPD positive, treated 1980    RLS (restless legs syndrome) 10/2022    Dr Lori Mo; neurontin    Thyroid nodule     Dr Emiliana Baker; left 5mm 2014; no change 2015, 2016, 8/17; 1/20 4mm [facility]    Venous insufficiency 07/2017     Family History   Problem Relation Age of Onset    Diabetes Mother     Hypertension Mother     Alcohol abuse Mother     Liver Disease Mother     Depression Sister     Obesity Sister     Cancer Maternal Grandmother         lung Heart Disease Maternal Grandmother          ROS:  + swell, bruise    Objective:  Resp 14   Ht 5' 5\" (1.651 m)   Wt 203 lb (92.1 kg)   BMI 33.78 kg/m²   NEURO:  Sensation intact light touch B/L lower extremities. Reflexes +2/4 patellar and Achilles bilaterally. MS:  Strength normal throughout upper and lower extremities bilateral .   right ankle/foot:  Positive tenderness at dorsal soft tissues. Negative for tenderness at base 5th, medial malleoli. Anterior drawer test negative. Talar tilt negative. Kleiger test negative. Syndesmosis squeeze negative. negative fibular head tenderness. Fibular head motion normal. Gait normal.  ROM normal. + Ecchymosis distal    Assessment/Plan:     ICD-10-CM ICD-9-CM    1. Foot sprain, right, initial encounter  S93.601A 845.10 diclofenac (VOLTAREN) 1 % gel          Patient (or guardian if minor) verbalizes understanding of evaluation and plan. Will continue stiff shoe but can wean and use voltaren gel w/ ice often as above and plan follow-up 12/23/2022 to recheck prior to departure for cruise.

## 2022-12-21 ENCOUNTER — TELEPHONE (OUTPATIENT)
Dept: INTERNAL MEDICINE CLINIC | Age: 52
End: 2022-12-21

## 2022-12-21 DIAGNOSIS — I10 PRIMARY HYPERTENSION: Primary | ICD-10-CM

## 2022-12-21 RX ORDER — HYDROCHLOROTHIAZIDE 25 MG/1
25 TABLET ORAL DAILY
Qty: 30 TABLET | Refills: 5 | Status: SHIPPED | OUTPATIENT
Start: 2022-12-21

## 2022-12-21 NOTE — TELEPHONE ENCOUNTER
Pt calling says her bp is not getting any better with the increase of the losartan. What would you advise?     177/105 12/21/2022  155/99 12/20/2022 167/102 yesterday afternoon  153/98 8:30 AM

## 2022-12-22 NOTE — TELEPHONE ENCOUNTER
Patient contacted and scheduled for 01-12-22 @ 09:00. Patient verbalized understanding of new medication, and will complete labs at [facility] 1 week prior.

## 2022-12-22 NOTE — TELEPHONE ENCOUNTER
LVM for the patient to return my call.      Per, Dr. Artem Gilliland:     Added hctz 25 every day; continue losarta and nadolol  Ov 3-4 weeks  Bmp 1 week before

## 2022-12-23 ENCOUNTER — OFFICE VISIT (OUTPATIENT)
Dept: ORTHOPEDIC SURGERY | Age: 52
End: 2022-12-23
Payer: COMMERCIAL

## 2022-12-23 VITALS — BODY MASS INDEX: 33.82 KG/M2 | HEIGHT: 65 IN | RESPIRATION RATE: 14 BRPM | WEIGHT: 203 LBS

## 2022-12-23 DIAGNOSIS — S93.601D FOOT SPRAIN, RIGHT, SUBSEQUENT ENCOUNTER: Primary | ICD-10-CM

## 2022-12-23 NOTE — LETTER
12/23/2022    Patient: Osmel Toure   YOB: 1970   Date of Visit: 12/23/2022     Mami Raphael MD  [address] [facility]  [address] [facility] [place]    Dear Mami Raphael MD,      Thank you for referring Ms. Santy Vasquez to [place] [address]. for evaluation. My notes for this consultation are attached. If you have questions, please do not hesitate to call me. I look forward to following your patient along with you.       Sincerely,    Yany Ureña, DO

## 2022-12-23 NOTE — PROGRESS NOTES
HISTORY OF PRESENT ILLNESS    Sary Dunn 1970 is a 45y.o. year old female comes in today to be evaluated and treated for: right foot sprain    Since last appt has noticed pain improved with shoe. Pain level 2/10. Using voltaren gel with benefit. IMAGING: XR right foot 2022 images reviewed and agree with:  1. No significant abnormality. Past Surgical History:   Procedure Laterality Date    HX ABDOMINOPLASTY      and mastopexy, Dr. Kesha Ramos  [id number]    silicone Dr Oksana Enciso      Dr. Megan Bauer  [phone]    HX COLONOSCOPY      Dr Javier Alcantar  negative    HX GI      EGD w gastritis, h pylori neg Dr. Malik Marrow      EMG negative Dr. Malik Marrow      MRI head negative    [place] RENAL SCOPE,ENDOPYELOTOMY      In [place] right hydroneph[place]     Social History     Socioeconomic History    Marital status:     Number of children: 2   Occupational History    Occupation: RN currently housewife     Employer: not employed   Tobacco Use    Smoking status: Former     Packs/day: 1.00     Years: 3.00     Pack years: 3.00     Types: Cigarettes     Quit date: 1987     Years since quittin.0    Smokeless tobacco: Never   Substance and Sexual Activity    Alcohol use: Not Currently     Comment: abuse-has not drank in four years    Drug use: No     Types: Marijuana     Comment: Former use a long time ago     Current Outpatient Medications   Medication Sig Dispense Refill    hydroCHLOROthiazide (HYDRODIURIL) 25 mg tablet Take 1 Tablet by mouth daily. 30 Tablet 5    diclofenac (VOLTAREN) 1 % gel Apply 2 g to affected area every six (6) hours as needed for Pain (right foot). 100 g 0    losartan (COZAAR) 100 mg tablet Take 1 Tablet by mouth daily.  90 Tablet 3    semaglutide (OZEMPIC) 1 mg/dose (2 mg/1.5 mL) sub-q pen 1 mg by SubCUTAneous route every seven (7) days. 1 Box 1    desvenlafaxine succinate (PRISTIQ) 25 mg ER tablet TAKE 1 TABLET BY MOUTH IN THE MORNING FOR 2 WEEKS      famotidine (PEPCID) 40 mg tablet       gabapentin (NEURONTIN) 100 mg capsule TAKE 2 CAPSULES BY MOUTH EVERY NIGHT 1 HOUR BEFORE BEDTIME      Poly-Iron 150 Forte capsule Take 1 Capsule by mouth daily. valACYclovir (VALTREX) 1 gram tablet TAKE 1 TABLET BY MOUTH EVERY DAY AS DIRECTED      nadoloL (CORGARD) 40 mg tablet Take 40 mg by mouth nightly. Trulance 3 mg tab TAKE 1 TABLET BY MOUTH DAILY WITH OR WITHOUT FOOD FOR CONSTIPATION      [place] Thyroid 60 mg tablet TAKE 1 TABLET BY MOUTH EVERY MORNING WITH 15 MG      [place] Thyroid 15 mg tablet TAKE 1 TABLET BY MOUTH EVERY MORNING WITH 60 MG      SUMAtriptan (IMITREX) 100 mg tablet TAKE 1 TABLET BY MOUTH AT ONSET OF MIGRAINE      ascorbic acid, vitamin C, (VITAMIN C) 250 mg tablet Take 1,600 mg by mouth daily. COLLAGEN by Does Not Apply route. fluticasone (Flonase Sensimist) 27.5 mcg/actuation nasal spray 1 [place] by Both Nostrils route daily. AIMOVIG AUTOINJECTOR 70 mg/mL injection 1 mL by SubCUTAneous route every thirty (30) days. furosemide (LASIX) 20 mg tablet Take once daily as needed for edema 30 Tab 6    progesterone (PROMETRIUM) 200 mg capsule 400 mg.  3    rizatriptan (MAXALT) 10 mg tablet TAKE 1 TABLET BY MOUTH 1 TIME AS NEEDED FOR MIGRAINE. MAY REPEAT IN 2 HOURS AS NEEDED 12 Tab 5    acetaminophen (TYLENOL) 325 mg tablet Take  by mouth every four (4) hours as needed for Pain. ibuprofen (MOTRIN) 200 mg tablet Take 600 mg by mouth every eight (8) hours as needed. estradioL (VIVELLE) 0.0375 mg/24 hr 1 Patch by TransDERmal route two (2) times a week on Wednesday and Saturday. melatonin 3 mg tablet Take 5 mg by mouth nightly. docusate sodium (COLACE) 100 mg capsule Take 1,600 mg by mouth two (2) times a day.  6 in the am and 10 at night      cholecalciferol (VITAMIN D3) (2,000 UNITS /50 MCG) cap capsule Take 1,000 Units by mouth daily. Past Medical History:   Diagnosis Date    Alcohol abuse     Anemia, iron deficiency 08/2021    saw Dr Rachele Phelps years Dr. Carol Chau; Home-[facility]autista 2011(?); then llumb    Constipation     COVID-19 virus infection     regeneron (12/21); paxlovid (6/22)    Degenerative disc disease, cervical 2009    C5-6 Dr Wilson Golbderg 2019    Dr Steve Berger    Gastritis 12/2012    Dr Hallie Smyth 10/21    GERD (gastroesophageal reflux disease) 2009    [place], last EGD 12/12    H/O bone graft 05/22/2017    Dental bone graft for dental implant    Hydronephrosis of right kidney     Dr Irma Michael    Hypertension     Hypothyroidism     Dr Aaron Medrano     IFG (impaired fasting glucose) 12/2020    Interstitial cystitis     possible per gyn    Lumbar degenerative disc disease 01/2014    L4-5 mild degen disc w mild central stenosis    Migraine     Dr. Jonah Billings, saw Dr. Ish Sparks also; Dr Enrico Tovar the Dr Blake Mendoza for botox; now aimovig    Obesity (BMI 30-39.9) 03/23/2018    HERMES (obstructive sleep apnea) 2011?     on cpap although she's not using Dr. Jonah Billings    HERMES (obstructive sleep apnea)     Dr Chirinos Cousin; home sleep test showed AHI 3 but possibly inaccurate due to tech issues; RLS    Osteoarthritis, knee     Dr. Kevin Doss    Osteopenia 2/13 FRAX 3.0 and 0.3      DEXA t score 0.4 spine, -1.6 hip (2/13); -0.3 spine, -1.5 hip w FRAX 3.4/0.3 (4/15); w/u neg for secondary causes    PPD positive, treated 1980    RLS (restless legs syndrome) 10/2022    Dr Chirinos Cousin; neurontin    Thyroid nodule     Dr Sonya Frost; left 5mm 2014; no change 2015, 2016, 8/17; 1/20 4mm [facility]    Venous insufficiency 07/2017     Family History   Problem Relation Age of Onset    Diabetes Mother     Hypertension Mother     Alcohol abuse Mother     Liver Disease Mother     Depression Sister     Obesity Sister     Cancer Maternal Grandmother         lung    Heart Disease Maternal Grandmother          ROS:  Minimal swell, bruise    Objective:  Resp 14   Ht 5' 5\" (1.651 m)   Wt 203 lb (92.1 kg)   BMI 33.78 kg/m²   NEURO:  Sensation intact light touch B/L lower extremities. Reflexes +2/4 patellar and Achilles bilaterally. MS:  Strength normal throughout upper and lower extremities bilateral .   right ankle/foot:  Less tenderness at dorsal soft tissues, worst lateral.  Negative for tenderness at base 5th, medial malleoli. Anterior drawer test negative. Talar tilt negative. Kleiger test negative. Syndesmosis squeeze negative. negative fibular head tenderness. Fibular head motion normal. Gait normal.  ROM normal.    Assessment/Plan:     ICD-10-CM ICD-9-CM    1. Foot sprain, right, subsequent encounter  S93.601D V58.89      845.10           Patient (or guardian if minor) verbalizes understanding of evaluation and plan. Will continue stiff shoe and voltaren gel w/ re-demo stretch to optimize as above and plan follow-up if needed. Discussed use stiff shoe on beach to ensure can tolerate.

## 2023-01-05 ENCOUNTER — HOSPITAL ENCOUNTER (OUTPATIENT)
Dept: LAB | Age: 53
Discharge: HOME | End: 2023-01-05
Payer: COMMERCIAL

## 2023-01-05 DIAGNOSIS — I10 PRIMARY HYPERTENSION: ICD-10-CM

## 2023-01-05 DIAGNOSIS — R73.01 IFG (IMPAIRED FASTING GLUCOSE): ICD-10-CM

## 2023-01-05 LAB
ANION GAP SERPL CALC-SCNC: 3 MMOL/L (ref 3–18)
BUN SERPL-MCNC: 18 MG/DL (ref 7–18)
BUN/CREAT SERPL: 21 (ref 12–20)
CALCIUM SERPL-MCNC: 9.7 MG/DL (ref 8.5–10.1)
CHLORIDE SERPL-SCNC: 106 MMOL/L (ref 100–111)
CO2 SERPL-SCNC: 30 MMOL/L (ref 21–32)
CREAT SERPL-MCNC: 0.87 MG/DL (ref 0.6–1.3)
EST. AVERAGE GLUCOSE BLD GHB EST-MCNC: 97 MG/DL
GLUCOSE SERPL-MCNC: 88 MG/DL (ref 74–99)
HBA1C MFR BLD: 5 % (ref 4.2–5.6)
POTASSIUM SERPL-SCNC: 4.9 MMOL/L (ref 3.5–5.5)
SODIUM SERPL-SCNC: 139 MMOL/L (ref 136–145)

## 2023-01-05 PROCEDURE — 36415 COLL VENOUS BLD VENIPUNCTURE: CPT

## 2023-01-05 PROCEDURE — 83036 HEMOGLOBIN GLYCOSYLATED A1C: CPT

## 2023-01-05 PROCEDURE — 80048 BASIC METABOLIC PNL TOTAL CA: CPT

## 2023-01-06 NOTE — PROGRESS NOTES
Liz Ding presents today for   Chief Complaint   Patient presents with    Follow-up    Labs     1-5-23    Hypertension     Primary    Migraine     1. \"Have you been to the ER, urgent care clinic since your last visit? Hospitalized since your last visit? \" no    2. \"Have you seen or consulted any other health care providers outside of the [address] [place] [address] since your last visit? \" yes     3. For patients aged 39-70: Has the patient had a colonoscopy / FIT/ Cologuard? Yes - no Care Gap present      If the patient is female:    4. For patients aged 41-77: Has the patient had a mammogram within the past 2 years? Yes - no Care Gap present  See top three    5. For patients aged 21-65: Has the patient had a pap smear?  Yes - no Care Gap present

## 2023-01-10 ENCOUNTER — OFFICE VISIT (OUTPATIENT)
Dept: ORTHOPEDIC SURGERY | Age: 53
End: 2023-01-10
Payer: COMMERCIAL

## 2023-01-10 VITALS
HEIGHT: 65 IN | WEIGHT: 203 LBS | OXYGEN SATURATION: 100 % | TEMPERATURE: 97.9 F | BODY MASS INDEX: 33.82 KG/M2 | HEART RATE: 82 BPM

## 2023-01-10 DIAGNOSIS — M47.816 LUMBAR FACET ARTHROPATHY: Primary | ICD-10-CM

## 2023-01-10 DIAGNOSIS — M54.16 LEFT LUMBAR RADICULOPATHY: ICD-10-CM

## 2023-01-10 DIAGNOSIS — N13.30 HYDRONEPHROSIS, UNSPECIFIED HYDRONEPHROSIS TYPE: ICD-10-CM

## 2023-01-10 DIAGNOSIS — M62.838 MUSCLE SPASM: ICD-10-CM

## 2023-01-10 DIAGNOSIS — M48.061 SPINAL STENOSIS OF LUMBAR REGION WITHOUT NEUROGENIC CLAUDICATION: ICD-10-CM

## 2023-01-10 PROCEDURE — 99214 OFFICE O/P EST MOD 30 MIN: CPT | Performed by: PHYSICAL MEDICINE & REHABILITATION

## 2023-01-10 RX ORDER — METHYLPREDNISOLONE 4 MG/1
TABLET ORAL
Qty: 1 DOSE PACK | Refills: 1 | Status: SHIPPED | OUTPATIENT
Start: 2023-01-10

## 2023-01-10 RX ORDER — VORTIOXETINE 10 MG/1
TABLET, FILM COATED ORAL
COMMUNITY
Start: 2023-01-02

## 2023-01-10 RX ORDER — METHOCARBAMOL 500 MG/1
TABLET, FILM COATED ORAL
Qty: 60 TABLET | Refills: 1 | Status: SHIPPED | OUTPATIENT
Start: 2023-01-10

## 2023-01-10 NOTE — PROGRESS NOTES
Jazmyn Sanchez presents today for   Chief Complaint   Patient presents with    Back Pain       Is someone accompanying this pt? no    Is the patient using any DME equipment during OV? nop    Depression Screening:  3 most recent PHQ Screens 12/13/2022   PHQ Not Done -   Little interest or pleasure in doing things Not at all   Feeling down, depressed, irritable, or hopeless Not at all   Total Score PHQ 2 0       Learning Assessment:  Learning Assessment 3/17/2016   PRIMARY LEARNER Patient   HIGHEST LEVEL OF EDUCATION - PRIMARY LEARNER  -   BARRIERS PRIMARY LEARNER -   CO-LEARNER CAREGIVER -   PRIMARY LANGUAGE ENGLISH   LEARNER PREFERENCE PRIMARY OTHER (COMMENT)   ANSWERED BY patient   RELATIONSHIP SELF       Abuse Screening:  Abuse Screening Questionnaire 12/13/2022   Do you ever feel afraid of your partner? N   Are you in a relationship with someone who physically or mentally threatens you? N   Is it safe for you to go home? Y       Fall Risk  No flowsheet data found. OPIOID RISK TOOL  No flowsheet data found. Coordination of Care:  1. Have you been to the ER, urgent care clinic since your last visit? no  Hospitalized since your last visit? non    2. Have you seen or consulted any other health care providers outside of the [address] [place] [address] since your last visit? no Include any pap smears or colon screening.  no

## 2023-01-10 NOTE — LETTER
1/11/2023    Patient: Stephon Minors   YOB: 1970   Date of Visit: 1/10/2023     Maria G Eden MD  [address] [facility] [place]  [address]  [place]    Dear Maria G Eden MD,      Thank you for referring Ms. Jovita Thurman to [facility] for evaluation. My notes for this consultation are attached. If you have questions, please do not hesitate to call me. I look forward to following your patient along with you.       Sincerely,    Dulce Maria Leary MD

## 2023-01-10 NOTE — PROGRESS NOTES
[facility]  [address] [place], [facility] [address]  Phone: [phone]  Fax: [phone]    Pt's YOB: 1970    ASSESSMENT   Diagnoses and all orders for this visit:    1. Lumbar facet arthropathy  -     methylPREDNISolone (MEDROL DOSEPACK) 4 mg tablet; Per dose pack instructions    2. Spinal stenosis of lumbar region without neurogenic claudication    3. Muscle spasm  -     methocarbamoL (ROBAXIN) 500 mg tablet; Take 1 tab by mouth BID-TID as needed for muscle spasm. 4. Left lumbar radiculopathy    5. Hydronephrosis, unspecified hydronephrosis type       IMPRESSION AND PLAN:  Josephus Kussmaul is a 45 y.o. female with history of lumbar and cervical pain and presents to the office today for MRI follow up. Pt continues to complain of aching and stabbing pain in the left lumbar region radiating into the upper anterior aspect of the left thigh, relieved with sitting. She is taking Neurontin 100 mg 2 caps QHS with sedation, secondary to restless leg syndrome. Pt confirms she would like to defer on NSAID's and pain medication secondary to migraines and rebound headaches. 1) Pt was given information on upper back and lumbar arthritis exercises. 2) Lumbar spine MRI from 12/20/2022 was reviewed with the patient at today's office visit. 3) She will follow up with her neurologist in order to add thoracic dry needling to cervical dry needling orders. 4) A Medrol Dosepak was prescribed for acute inflammatory pain. 5) Pt was prescribed Robaxin 500 mg 1 tab BID-TID as needed for muscle spasms. 6) Ms. Kristofer Terry has a reminder for a \"due or due soon\" health maintenance. I have asked that she contact her primary care provider, Kortney Vallejo MD, for follow-up on this health maintenance. 7)  demonstrated consistency with prescribing. 8) Pt would like to defer from NSAID's due to hx of migraines and rebound headaches.    Follow-up and Dispositions    Return in about 6 weeks (around 2/21/2023). HISTORY OF PRESENT ILLNESS:  Lawanda Stevens is a 45 y.o. female with history of lumbar and cervical pain and presents to the office today for MRI follow up. Pt continues to complain of aching and stabbing pain in the left lumbar region radiating into the upper anterior aspect of the left thigh, relieved with sitting. She reports a new onset of radicular pain radiating into the mid-back, progressive since her last office visit. Pt admits recently discontinuing physical therapy due to abnormally high blood pressure, Tian Gordillo MD is following pt regarding this issue. She is anticipating undergoing dry needling in the cervical region secondary to migraines, neurology is ordering this. Pt notes pain shooting into the left leg has improved since initial office visit. She is taking Neurontin 100 mg 2 caps QHS with sedation, secondary to restless leg syndrome. Pt confirms she would like to deter on NSAID's and pain medication secondary to migraines and rebound headaches. Labs from 08/04/2022 were reviewed and demonstrated a vitamin D level of 67.9 ng/mL. Pt at this time desires to proceed with medication evaluation. Pain Scale: 5/10    PCP: Tian Gordillo MD     Past Medical History:   Diagnosis Date    Alcohol abuse     Anemia, iron deficiency 08/2021    saw Dr Andrea Stern years Dr. Krish Zee;   Bhat-Bautista 2011(?); then llumb    Constipation     COVID-19 virus infection     regeneron (12/21); paxlovid (6/22)    Degenerative disc disease, cervical 2009    C5-6 Dr Louis Pean 2019    Dr Joan Bhagat    Gastritis 12/2012    Dr Husam Ratliff 10/21    GERD (gastroesophageal reflux disease) 2009    Irma Breeding, last EGD 12/12    H/O bone graft 05/22/2017    Dental bone graft for dental implant    Hydronephrosis of right kidney     Dr Rosibel Bear    Hypertension     Hypothyroidism     Dr Aileen Mitchell     IFG (impaired fasting glucose) 2020    Interstitial cystitis     possible per gyn    Lumbar degenerative disc disease 2014    L4-5 mild degen disc w mild central stenosis    Migraine     Dr. Maggi Araiza, saw Dr. Tam Lozano also; Dr House Senior the Dr Miah Johnson for botox; now aimovig    Obesity (BMI 30-39.9) 2018    HERMES (obstructive sleep apnea) ?     on cpap although she's not using Dr. Maggi Araiza    HERMES (obstructive sleep apnea)     Dr Cora Peterson; home sleep test showed AHI 3 but possibly inaccurate due to tech issues; RLS    Osteoarthritis, knee     Dr. Davon Aragon    Osteopenia  FRAX 3.0 and 0.3      DEXA t score 0.4 spine, -1.6 hip (); -0.3 spine, -1.5 hip w FRAX 3.4/0.3 (4/15); w/u neg for secondary causes    PPD positive, treated 1980    RLS (restless legs syndrome) 10/2022    Dr Cora Peterson; neurontin    Thyroid nodule     Dr Dorian Pulido; left 5mm ; no change , , ;  4mm [facility]    Venous insufficiency 2017        Social History     Socioeconomic History    Marital status:      Spouse name: Not on file    Number of children: 2    Years of education: Not on file    Highest education level: Not on file   Occupational History    Occupation: RN currently housewife     Employer: not employed   Tobacco Use    Smoking status: Former     Packs/day: 1.00     Years: 3.00     Pack years: 3.00     Types: Cigarettes     Quit date: 1987     Years since quittin.0    Smokeless tobacco: Never   Substance and Sexual Activity    Alcohol use: Not Currently     Comment: abuse-has not drank in four years    Drug use: No     Types: Marijuana     Comment: Former use a long time ago    Sexual activity: Not on file   Other Topics Concern    Not on file   Social History Narrative    Not on file     Social Determinants of Health     Financial Resource Strain: Not on file   Food Insecurity: Not on file   Transportation Needs: Not on file   Physical Activity: Not on file   Stress: Not on file   Social Connections: Not on file   Intimate Partner Violence: Not on file   Housing Stability: Not on file       Current Outpatient Medications   Medication Sig Dispense Refill    Trintellix 10 mg tablet       methocarbamoL (ROBAXIN) 500 mg tablet Take 1 tab by mouth BID-TID as needed for muscle spasm. 60 Tablet 1    methylPREDNISolone (MEDROL DOSEPACK) 4 mg tablet Per dose pack instructions 1 Dose Pack 1    hydroCHLOROthiazide (HYDRODIURIL) 25 mg tablet Take 1 Tablet by mouth daily. 30 Tablet 5    diclofenac (VOLTAREN) 1 % gel Apply 2 g to affected area every six (6) hours as needed for Pain (right foot). 100 g 0    losartan (COZAAR) 100 mg tablet Take 1 Tablet by mouth daily. 90 Tablet 3    semaglutide (OZEMPIC) 1 mg/dose (2 mg/1.5 mL) sub-q pen 1 mg by SubCUTAneous route every seven (7) days. 1 Box 1    desvenlafaxine succinate (PRISTIQ) 25 mg ER tablet TAKE 1 TABLET BY MOUTH IN THE MORNING FOR 2 WEEKS      famotidine (PEPCID) 40 mg tablet       gabapentin (NEURONTIN) 100 mg capsule TAKE 2 CAPSULES BY MOUTH EVERY NIGHT 1 HOUR BEFORE BEDTIME      Poly-Iron 150 Forte capsule Take 1 Capsule by mouth daily. valACYclovir (VALTREX) 1 gram tablet TAKE 1 TABLET BY MOUTH EVERY DAY AS DIRECTED      nadoloL (CORGARD) 40 mg tablet Take 40 mg by mouth nightly. Trulance 3 mg tab TAKE 1 TABLET BY MOUTH DAILY WITH OR WITHOUT FOOD FOR CONSTIPATION      [place] Thyroid 60 mg tablet TAKE 1 TABLET BY MOUTH EVERY MORNING WITH 15 MG      [place] Thyroid 15 mg tablet TAKE 1 TABLET BY MOUTH EVERY MORNING WITH 60 MG      SUMAtriptan (IMITREX) 100 mg tablet TAKE 1 TABLET BY MOUTH AT ONSET OF MIGRAINE      ascorbic acid, vitamin C, (VITAMIN C) 250 mg tablet Take 1,600 mg by mouth daily. COLLAGEN by Does Not Apply route. fluticasone (Flonase Sensimist) 27.5 mcg/actuation nasal spray 1 [place] by Both Nostrils route daily. AIMOVIG AUTOINJECTOR 70 mg/mL injection 1 mL by SubCUTAneous route every thirty (30) days.       furosemide (LASIX) 20 mg tablet Take once daily as needed for edema 30 Tab 6    progesterone (PROMETRIUM) 200 mg capsule 400 mg.  3    rizatriptan (MAXALT) 10 mg tablet TAKE 1 TABLET BY MOUTH 1 TIME AS NEEDED FOR MIGRAINE. MAY REPEAT IN 2 HOURS AS NEEDED 12 Tab 5    acetaminophen (TYLENOL) 325 mg tablet Take  by mouth every four (4) hours as needed for Pain. ibuprofen (MOTRIN) 200 mg tablet Take 600 mg by mouth every eight (8) hours as needed. estradioL (VIVELLE) 0.0375 mg/24 hr 1 Patch by TransDERmal route two (2) times a week on Wednesday and Saturday. melatonin 3 mg tablet Take 5 mg by mouth nightly. docusate sodium (COLACE) 100 mg capsule Take 1,600 mg by mouth two (2) times a day. 6 in the am and 10 at night      cholecalciferol (VITAMIN D3) (2,000 UNITS /50 MCG) cap capsule Take 1,000 Units by mouth daily. Allergies   Allergen Reactions    Tetracyclines Other (comments)     Pt states that she had vomiting at that time but was in her 25s         REVIEW OF SYSTEMS    Constitutional: Negative for fever, chills, or weight change. Respiratory: Negative for cough or shortness of breath. Cardiovascular: Negative for chest pain or palpitations. Gastrointestinal: Negative for acid reflux, change in bowel habits, or constipation. Genitourinary: Negative for dysuria and flank pain. Musculoskeletal: Positive for mid-back, lumbar and left leg pain. Skin: Negative for rash. Neurological: Negative for headaches, dizziness, or numbness. Endo/Heme/Allergies: Negative for increased bruising. Psychiatric/Behavioral: Negative for difficulty with sleep. As per HPI    PHYSICAL EXAMINATION  Visit Vitals  Pulse 82   Temp 97.9 °F (36.6 °C) (Temporal)   Ht 5' 5\" (1.651 m)   Wt 203 lb (92.1 kg)   LMP  (LMP Unknown)   SpO2 100%   BMI 33.78 kg/m²       Constitutional: Awake, alert, and in no acute distress. Neurological: Sensation to light touch is intact. Negative Blanco's sign bilaterally. Skin: warm, dry, and intact.    Musculoskeletal: Tightness across the upper trapezius region. Tightness in the paraspinous muscle. No pain with extension, axial loading, or forward flexion. No pain with internal or external rotation of her hips. Negative straight leg raise bilaterally. Biceps  Triceps Deltoids Wrist Ext Wrist Flex Hand Intrin   Right +4/5 +4/5 +4/5 +4/5 +4/5 +4/5   Left +4/5 +4/5 +4/5 +4/5 +4/5 +4/5      Hip Flex  Quads Hamstrings Ankle DF EHL Ankle PF   Right +4/5 +4/5 +4/5 +4/5 +4/5 +4/5   Left +4/5 +4/5 +4/5 +4/5 +4/5 +4/5     IMAGING:    Lumbar spine MRI from 12/20/2022 was personally reviewed with the patient and demonstrated:  FINDINGS:     Postoperative : None. Vertebral alignment: Normal.     Vertebral body heights: Normal.     Marrow signal: Normal.     Cord: Conus medullaris terminates at the L1 level with normal signal.     Soft tissues: Severe right hydronephrosis, partially imaged, also seen on CT 2020. Mild posterior paraspinous muscular atrophy. Correlation of axial and sagittal data through the disc levels:     -L1-2: Mild posterior disc bulge. No central stenosis. Small bilateral facet joint effusion. No foraminal stenosis. -L2-3: Mild posterior disc bulge with posterolateral corner disc protrusion. No central stenosis. Trace left facet joint effusion. No foraminal stenosis. -L3-4: Mild posterior disc bulge. Mild posterior epidural fat. No central stenosis. Bilateral facet joint effusion. No foraminal stenosis. -L4-5: Posterior disc bulge. Posterior epidural fat. More severe facet and ligamentous hypertrophy. AP canal measures 5 mm, moderately severe central stenosis. Mild facet inflammation. No foraminal stenosis. -L5-S1: Mild posterior disc bulge. No central stenosis. Facet and ligamentous hypertrophy. Facet degenerative joint disease. No foraminal stenosis. IMPRESSION   1. Degenerative disc disease is mild throughout.  However, when combined with posterior epidural fat and  facet hypertrophy, there is moderately severe central stenosis at L4-L5 as described. 2. Facet disease with joint effusion and inflammation. 3. Severe right hydronephrosis, partially imaged. Clinical correlation? Seen on CT 2020     Lumbar spine 2V x-rays from 09/27/2022 were personally reviewed and demonstrated:  Multi-level degenerative facets, mild ddd L5/S1     AP Pelvis X-rays from 11/12/2019 were personally reviewed and demonstrated:   IUD is present, no significant degenerative changes. Cervical spine 2V x-rays from 12/17/2018 were personally reviewed and demonstrated:  Straightening of the cervical spine. Mild degenerative facets in the lower cervical region. Cervical spine MRI from 09/26/2013 was personally reviewed and demonstrated:  MRI of cervical spine without contrast     CPT CODE: 48522     HISTORY: Pain, radiculopathy     COMPARISON: None     FINDINGS:     The visualized posterior fossa contents look normal. The prevertebral soft  tissues look normal.     The cervical cord is normal in signal and caliber. There is mild reversal of the normal cervical lordosis. No significant  listhesis. Vertebral body heights are maintained. No pathologic signal  abnormality within the bone marrow. There is mild edema in the posterior soft  tissues in the region of the upper cervical spine, reference sagittal inversion  recovery image  7. Disc heights are preserved. C1-2: Normal     C2-3: Normal     C3-4: Normal     C4-5: Minimal posterior disc bulge. No significant foraminal or canal stenosis. C5-6: Mild uncovertebral and facet arthropathy, without significant canal or  foraminal stenosis. C6-7: Minimal posterior disc bulge. Mild uncovertebral and facet arthropathy. Posterior ligamentous thickening. Canal remains patent. Mild left foraminal  narrowing. C7-T1: Normal     IMPRESSION:  Mild multilevel degenerative changes, without critical canal stenosis. Mild  left foraminal narrowing at C6-7.      Mild edema in the posterior soft tissues of the upper cervical spine may  represent mild soft tissue injury. No evidence of ligamentous trauma. Written by Adin Horan, as dictated by Kevin Leal MD.  I, Dr. Kevin Leal confirm that all documentation is accurate.

## 2023-01-10 NOTE — PATIENT INSTRUCTIONS
Healthy Upper Back: Exercises  Introduction  Here are some examples of exercises for your upper back. Start each exercise slowly. Ease off the exercise if you start to have pain. Your doctor or physical therapist will tell you when you can start these exercises and which ones will work best for you. How to do the exercises  Lower neck and upper back stretch  Stretch your arms out in front of your body. Clasp one hand on top of your other hand. Gently reach out so that you feel your shoulder blades stretching away from each other. Gently bend your head forward. Hold for 15 to 30 seconds. Repeat 2 to 4 times. Midback stretch  If you have knee pain, do not do this exercise. Kneel on the floor, and sit back on your ankles. Lean forward, place your hands on the floor, and stretch your arms out in front of you. Rest your head between your arms. Gently push your chest toward the floor, reaching as far in front of you as possible. Hold for 15 to 30 seconds. Repeat 2 to 4 times. Shoulder rolls  Sit comfortably with your feet shoulder-width apart. You can also do this exercise while standing. Roll your shoulders up, then back, and then down in a smooth, circular motion. Repeat 2 to 4 times. Wall push-up  Stand against a wall with your feet about 12 to 24 inches back from the wall. If you feel any pain when you do this exercise, stand closer to the wall. Place your hands on the wall slightly wider apart than your shoulders, and lean forward. Gently lean your body toward the wall. Then push back to your starting position. Keep the motion smooth and controlled. Repeat 8 to 12 times. Resisted shoulder blade squeeze  For this exercise, you will need elastic exercise material, such as surgical tubing or Thera-Band. Sit or stand, holding the band in both hands in front of you. Keep your elbows close to your sides, bent at a 90-degree angle. Your palms should face up.   Squeeze your shoulder blades together, and move your arms to the outside, stretching the band. Be sure to keep your elbows at your sides while you do this. Relax. Repeat 8 to 12 times. Resisted rows  For this exercise, you will need elastic exercise material, such as surgical tubing or Thera-Band. Put the band around a solid object, such as a bedpost, at about waist level. Hold one end of the band in each hand. With your elbows at your sides and bent to 90 degrees, pull the band back to move your shoulder blades toward each other. Return to the starting position. Repeat 8 to 12 times. Follow-up care is a key part of your treatment and safety. Be sure to make and go to all appointments, and call your doctor if you are having problems. It's also a good idea to know your test results and keep a list of the medicines you take. Current as of: March 9, 2022               Content Version: 13.4  © 2006-2022 [employer]. Care instructions adapted under license by [employer] (which disclaims liability or warranty for this information). If you have questions about a medical condition or this instruction, always ask your healthcare professional. [place] [address] any warranty or liability for your use of this information. Low Back Arthritis: Exercises  Introduction  Here are some examples of typical rehabilitation exercises for your condition. Start each exercise slowly. Ease off the exercise if you start to have pain. Your doctor or physical therapist will tell you when you can start these exercises and which ones will work best for you. When you are not being active, find a comfortable position for rest. Some people are comfortable on the floor or a medium-firm bed with a small pillow under their head and another under their knees. Some people prefer to lie on their side with a pillow between their knees. Don't stay in one position for too long. Take short walks (10 to 20 minutes) every 2 to 3 hours. Avoid slopes, hills, and stairs until you feel better. Walk only distances you can manage without pain, especially leg pain. How to do the exercises  Pelvic tilt  Lie on your back with your knees bent. \"Brace\" your stomach--tighten your muscles by pulling in and imagining your belly button moving toward your spine. Press your lower back into the floor. You should feel your hips and pelvis rock back. Hold for 6 seconds while breathing smoothly. Relax and allow your pelvis and hips to rock forward. Repeat 8 to 12 times. Back stretches  Get down on your hands and knees on the floor. Relax your head and allow it to droop. Round your back up toward the ceiling until you feel a nice stretch in your upper, middle, and lower back. Hold this stretch for as long as it feels comfortable, or about 15 to 30 seconds. Return to the starting position with a flat back while you are on your hands and knees. Let your back sway by pressing your stomach toward the floor. Lift your buttocks toward the ceiling. Hold this position for 15 to 30 seconds. Repeat 2 to 4 times. Follow-up care is a key part of your treatment and safety. Be sure to make and go to all appointments, and call your doctor if you are having problems. It's also a good idea to know your test results and keep a list of the medicines you take. Current as of: March 9, 2022               Content Version: 13.4  © 1228-8634 [employer]. Care instructions adapted under license by [employer] (which disclaims liability or warranty for this information). If you have questions about a medical condition or this instruction, always ask your healthcare professional. [place] [address] any warranty or liability for your use of this information.

## 2023-01-12 ENCOUNTER — OFFICE VISIT (OUTPATIENT)
Dept: INTERNAL MEDICINE CLINIC | Age: 53
End: 2023-01-12
Payer: COMMERCIAL

## 2023-01-12 VITALS
BODY MASS INDEX: 32.65 KG/M2 | HEART RATE: 66 BPM | WEIGHT: 196 LBS | TEMPERATURE: 97.9 F | HEIGHT: 65 IN | SYSTOLIC BLOOD PRESSURE: 121 MMHG | DIASTOLIC BLOOD PRESSURE: 73 MMHG | RESPIRATION RATE: 19 BRPM | OXYGEN SATURATION: 98 %

## 2023-01-12 DIAGNOSIS — R73.01 IFG (IMPAIRED FASTING GLUCOSE): ICD-10-CM

## 2023-01-12 DIAGNOSIS — E66.9 OBESITY (BMI 30-39.9): ICD-10-CM

## 2023-01-12 DIAGNOSIS — E03.9 ACQUIRED HYPOTHYROIDISM: Primary | ICD-10-CM

## 2023-01-12 DIAGNOSIS — I10 PRIMARY HYPERTENSION: ICD-10-CM

## 2023-01-12 DIAGNOSIS — G43.009 MIGRAINE WITHOUT AURA AND WITHOUT STATUS MIGRAINOSUS, NOT INTRACTABLE: ICD-10-CM

## 2023-01-12 PROCEDURE — 99214 OFFICE O/P EST MOD 30 MIN: CPT | Performed by: INTERNAL MEDICINE

## 2023-01-12 PROCEDURE — 3078F DIAST BP <80 MM HG: CPT | Performed by: INTERNAL MEDICINE

## 2023-01-12 PROCEDURE — 3074F SYST BP LT 130 MM HG: CPT | Performed by: INTERNAL MEDICINE

## 2023-01-12 RX ORDER — SEMAGLUTIDE 1.7 MG/.75ML
1.7 INJECTION, SOLUTION SUBCUTANEOUS
Qty: 4 EACH | Refills: 1 | Status: SHIPPED | OUTPATIENT
Start: 2023-01-12

## 2023-01-12 NOTE — PROGRESS NOTES
45 y.o. WHITE/NON- female who presents for evaluation    Denies any chest pain, shortness of breath, PND, orthopnea, palpitations or syncope. We inc the losartan at the last visit in 12/22 and bp doing better    No new gi or gu issues    Still seeing Dr Uday Rod who is managing the [facility] meds    Interested in inc the semaglutide dosing to 1.7 for wt loss    Also wants to restart the aimovig which had cut down her frequency to maybe 3/mo    Past Medical History:   Diagnosis Date    Alcohol abuse     Anemia, iron deficiency 08/2021    saw Dr Willi Morgan years Dr. Estrella Reese; Bhat-Bautista 2011(?); then llumb    Constipation     COVID-19 virus infection     regeneron (12/21); paxlovid (6/22)    Degenerative disc disease, cervical 2009    C5-6 Dr Erik Marley 2019    Dr Rolando Arvizu    Gastritis 12/2012    Dr Jose Muse 10/21    GERD (gastroesophageal reflux disease) 2009    Mile Chavez, last EGD 12/12    H/O bone graft 05/22/2017    Dental bone graft for dental implant    Hydronephrosis of right kidney     Dr Alonso Mask    Hypertension     Hypothyroidism     Dr Tamera Roman     IFG (impaired fasting glucose) 12/2020    Interstitial cystitis     possible per gyn    Lumbar degenerative disc disease 01/2014    L4-5 mild degen disc w mild central stenosis    Migraine     Dr. Christine Corley, saw Dr. Landry Montez also; Dr Neves Client the Dr Pankaj Smith for botox; now aimovig    Obesity (BMI 30-39.9) 03/23/2018    HERMES (obstructive sleep apnea) 2011?     on cpap although she's not using Dr. Christine Corley    HERMES (obstructive sleep apnea)     Dr Ayanna Chirinos; home sleep test showed AHI 3 but possibly inaccurate due to tech issues; RLS    Osteoarthritis, knee     Dr. Mary Alexis    Osteopenia 2/13 FRAX 3.0 and 0.3      DEXA t score 0.4 spine, -1.6 hip (2/13); -0.3 spine, -1.5 hip w FRAX 3.4/0.3 (4/15); w/u neg for secondary causes    PPD positive, treated 1980    RLS (restless legs syndrome) 10/2022    Dr Ayanna Chirinos; neurontin    Thyroid nodule     Dr Briana Trinh; left 5mm ; no change , , ;  4mm [facility]    Venous insufficiency 2017     Past Surgical History:   Procedure Laterality Date    HX ABDOMINOPLASTY      and mastopexy, Dr. Mona Burton  0708    silicone Dr Yoana Franco      Dr. Sona Rodriguez  [id number]    HX COLONOSCOPY      Dr Arreaga Done  negative    HX GI      EGD w gastritis, h pylori neg Dr. Good Mandujano [address] [facility] NFROT/PLOT [address] [place] after right hydronephrosis    [place] UNLISTED NEUROLOGICAL/NEUROMUSCULAR DX PX      EMG negative Dr. Vianca Pisano NEUROLOGICAL/NEUROMUSCULAR DX [address]      MRI head negative     Social History     Socioeconomic History    Marital status:      Spouse name: Not on file    Number of children: 2    Years of education: Not on file    Highest education level: Not on file   Occupational History    Occupation: RN currently housewife     Employer: not employed   Tobacco Use    Smoking status: Former     Packs/day: 1.00     Years: 3.00     Pack years: 3.00     Types: Cigarettes     Quit date: 1987     Years since quittin.0    Smokeless tobacco: Never   Substance and Sexual Activity    Alcohol use: Not Currently     Comment: abuse-has not drank in four years    Drug use: No     Types: Marijuana     Comment: Former use a long time ago    Sexual activity: Not on file   Other Topics Concern    Not on file   Social History Narrative    Not on file     Social Determinants of Health     Financial Resource Strain: Not on file   Food Insecurity: Not on file   Transportation Needs: Not on file   Physical Activity: Not on file   Stress: Not on file   Social Connections: Not on file   Intimate Partner Violence: Not on file   Housing Stability: Not on file     Allergies   Allergen Reactions    Tetracyclines Other (comments)     Pt states that she had vomiting at that time but was in her 25s        Current Outpatient Medications on File Prior to Visit   Medication Sig Dispense Refill    Trintellix 10 mg tablet       methocarbamoL (ROBAXIN) 500 mg tablet Take 1 tab by mouth BID-TID as needed for muscle spasm. 60 Tablet 1    methylPREDNISolone (MEDROL DOSEPACK) 4 mg tablet Per dose pack instructions 1 Dose Pack 1    hydroCHLOROthiazide (HYDRODIURIL) 25 mg tablet Take 1 Tablet by mouth daily. 30 Tablet 5    diclofenac (VOLTAREN) 1 % gel Apply 2 g to affected area every six (6) hours as needed for Pain (right foot). 100 g 0    losartan (COZAAR) 100 mg tablet Take 1 Tablet by mouth daily. 90 Tablet 3    desvenlafaxine succinate (PRISTIQ) 25 mg ER tablet TAKE 1 TABLET BY MOUTH IN THE MORNING FOR 2 WEEKS      famotidine (PEPCID) 40 mg tablet       gabapentin (NEURONTIN) 100 mg capsule TAKE 2 CAPSULES BY MOUTH EVERY NIGHT 1 HOUR BEFORE BEDTIME      Poly-Iron 150 Forte capsule Take 1 Capsule by mouth daily. valACYclovir (VALTREX) 1 gram tablet TAKE 1 TABLET BY MOUTH EVERY DAY AS DIRECTED      nadoloL (CORGARD) 40 mg tablet Take 40 mg by mouth nightly. Trulance 3 mg tab TAKE 1 TABLET BY MOUTH DAILY WITH OR WITHOUT FOOD FOR CONSTIPATION      [place] Thyroid 60 mg tablet TAKE 1 TABLET BY MOUTH EVERY MORNING WITH 15 MG      [place] Thyroid 15 mg tablet TAKE 1 TABLET BY MOUTH EVERY MORNING WITH 60 MG      SUMAtriptan (IMITREX) 100 mg tablet TAKE 1 TABLET BY MOUTH AT ONSET OF MIGRAINE      ascorbic acid, vitamin C, (VITAMIN C) 250 mg tablet Take 1,600 mg by mouth daily. COLLAGEN by Does Not Apply route. fluticasone (Flonase Sensimist) 27.5 mcg/actuation nasal spray 1 [place] by Both Nostrils route daily.       furosemide (LASIX) 20 mg tablet Take once daily as needed for edema 30 Tab 6    progesterone (PROMETRIUM) 200 mg capsule 400 mg.  3    rizatriptan (MAXALT) 10 mg tablet TAKE 1 TABLET BY MOUTH 1 TIME AS NEEDED FOR MIGRAINE. MAY REPEAT IN 2 HOURS AS NEEDED 12 Tab 5    acetaminophen (TYLENOL) 325 mg tablet Take  by mouth every four (4) hours as needed for Pain. ibuprofen (MOTRIN) 200 mg tablet Take 600 mg by mouth every eight (8) hours as needed. estradioL (VIVELLE) 0.0375 mg/24 hr 1 Patch by TransDERmal route two (2) times a week on Wednesday and Saturday. melatonin 3 mg tablet Take 5 mg by mouth nightly. docusate sodium (COLACE) 100 mg capsule Take 1,600 mg by mouth two (2) times a day. 6 in the am and 10 at night      cholecalciferol (VITAMIN D3) (2,000 UNITS /50 MCG) cap capsule Take 1,000 Units by mouth daily. [DISCONTINUED] semaglutide (OZEMPIC) 1 mg/dose (2 mg/1.5 mL) sub-q pen 1 mg by SubCUTAneous route every seven (7) days. 1 Box 1    AIMOVIG AUTOINJECTOR 70 mg/mL injection 1 mL by SubCUTAneous route every thirty (30) days. (Patient not taking: Reported on 1/12/2023)       No current facility-administered medications on file prior to visit. REVIEW OF SYSTEMS: gyn Dr. Tamera Roman 2021, mammo 9/21, colo 9/16 Dr Mary Alexis  Ophtho - no vision change or eye pain  Oral - no mouth pain, tongue or tooth problems  Ears - no hearing loss, ear pain, fullness  Throat - no swallowing problems  Cardiac - no CP, PND, orthopnea, edema, palpitations or syncope  Chest - no breast masses  Resp - no wheezing, chronic coughing, dyspnea  Urinary - no dysuria, hematuria, flank pain, urgency, frequencybowel habits, bleeding, hemorrhoids    Visit Vitals  /73   Pulse 66   Temp 97.9 °F (36.6 °C) (Temporal)   Resp 19   Ht 5' 5\" (1.651 m)   Wt 196 lb (88.9 kg)   SpO2 98%   BMI 32.62 kg/m²     A&O x3  Affect is appropriate. Mood stable  No apparent distress  Anicteric, no JVD, adenopathy or thyromegaly. No carotid bruits or radiated murmur  Lungs clear to auscultation, no wheezes or rales  Heart showed regular rate and rhythm. No murmur, rubs, gallops  Abdomen soft nontender, no hepatosplenomegaly or masses. Extremities without edema.   Pulses 1-2+ symmetrically    LABS:  From 2/12 showed   gluc 80,   cr 0.60,    alt 29,          chol 156, tg 80, hdl 59, ldl-c 89,       vit d 47.0  From 3/13 showed   gluc 78,   cr 0.88,     alt 24,          chol 153, tg 64,   hdl 62, ldl-c 78,   wbc 5.6, hb 13.3, plt 334, tsh 1.30,        ua neg  From 4/13 showed                                  vit d 54.1,                       spep neg, pth 19  From 3/14 showed   gluc 87,   cr 0.80, gfr>60, alt 25,          chol 154, tg 51,   hdl 92, ldl-c 85,   wbc 6.1, hb 13.3, plt 331  From 3/15 showed   gluc 102, cr 0.60, gfr>60, alt 10,          chol 161, tg 73,   hdl 55, ldl-c 92,   wbc 6.8, hb 13.4, plt 296, tsh 0.88, vit d 69.5  From 6/16 showed   gluc 79,   cr 0.70, gfr>60, alt 12,          chol 196, tg 93,   hdl 62, ldl-c 116, wbc 5.5, hb 12.5, plt 336, tsh 0.41  From 3/18 showed   gluc 94,   cr 0.60, gfr>60, alt 16,          chol 171, tg 97,   hdl 60, ldl-c 91,   wbc 5.9, hb 13.2, plt 304,            ua neg  From 5/18 showed                       vit d 60.5, damaris neg, ra neg, ccp neg, b12 425, fol 14.4, esr 2, crp 0.2, syphilis neg  From 6/19 showed   gluc 99,   cr 0.80, gfr>60, alt 12,          chol 185, tg 92,   hdl 55, ldl-c 112, wbc 6.0, hb 13.2, plt 314, tsh 0.23, vit d 60.5, ua neg,   ft4 1.10  From 5/20 showed        alt 11, hba1c 5.5, chol 180, tg 115, hdl 56, ldl-c 101,              tsh 1.58,            ft4 0.90,   From 12/20 showed gluc 108, cr 0.70, gfr>60, alt 11,          chol 189, tg 122, hdl 60, ldl-c 105, wbc 6.6, hb 12.7, plt 348, tsh 1.65,        ua neg,   ft4 1.00  From 5/21 showed   gluc 97,   cr 0.75, gfr>60  From 9/21 showed                wbc 4.9, hb 10.6, plt 390  From 10/21 showed                                 fe 24, %sat 5, ferritin 4, b12 305, fol 9.5, spep neg  From 1/22 showed   gluc 102, cr 0.89, gfr>60, alt 18,          chol 199, tg 65,   hdl 77, ldl-c 109, wbc 5.4, hb 11.3, plt 466,              fe 34, %sat 8  From 7/22 showed   gluc 97,   cr 0.91, gfr>60, alt 27,          chol 218, tg 157, hdl 64, ldl-c 123, wbc 7.4, hb 13.1, plt 335    Results for orders placed or performed during the hospital encounter of 90/91/92   METABOLIC PANEL, BASIC   Result Value Ref Range    Sodium 139 136 - 145 mmol/L    Potassium 4.9 3.5 - 5.5 mmol/L    Chloride 106 100 - 111 mmol/L    CO2 30 21 - 32 mmol/L    Anion gap 3 3.0 - 18 mmol/L    Glucose 88 74 - 99 mg/dL    BUN 18 7.0 - 18 MG/DL    Creatinine 0.87 0.6 - 1.3 MG/DL    BUN/Creatinine ratio 21 (H) 12 - 20      eGFR >60 >60 ml/min/1.73m2    Calcium 9.7 8.5 - 10.1 MG/DL   HEMOGLOBIN A1C WITH EAG   Result Value Ref Range    Hemoglobin A1c 5.0 4.2 - 5.6 %    Est. average glucose 97 mg/dL       We reviewed the patient's labs from the last several visits to point out trends in the numbers        Patient Active Problem List   Diagnosis Code    Bulimia Dr. Riky Lainez G43.909    DJD knees Dr. Rena Simmons M19.90    Osteopenia 2/13 FRAX 3.0 and 0.3  M85.80    GERD without esophagitis K21.9    Bilateral leg edema R60.0    Asymptomatic varicose veins I83.90    Thyroid nodule E04.1    Obesity (BMI 30-39. 9) E66.9    Primary hypertension I10    Acquired hypothyroidism E03.9    IFG (impaired fasting glucose) R73.01     ASSESSMENT AND PLAN:  1. Psychiatric. Follow up Dr Alcides Brush  2. Migraines. Continue current and f/u Dr. Michelle Moreira group, restart aimovig  3. Thyroid nodule. US 2023  4. GERD and constipation, recent gastritis. F/U Dr Raffi Beltran, ppi  5. Osteopenia. On ca/d, encouraged wt bearing exercises  6. Edema. Prn diuretic  7. Endocrine/gyn. F/U Dr. Rakan Christy  8. HTN. Controlled on current  9. Iron def anemia. Follow up heme and gi  10. Obesity. IFG. Long discussion about med options. Inc semaglutide to 1.7        RTC 7/23    Above conditions discussed at length and patient vocalized understanding. All questions answered to patient satisfaction        ICD-10-CM ICD-9-CM    1. Acquired hypothyroidism  E03.9 244.9 TSH 3RD GENERATION      2.  IFG (impaired fasting glucose)  M18.13 684.00 METABOLIC PANEL, COMPREHENSIVE      HEMOGLOBIN A1C WITH EAG      3. Migraine without aura and without status migrainosus, not intractable  G43.009 346.10       4. Obesity (BMI 30-39. 9)  E66.9 278.00 semaglutide, weight loss, ([place]ovy) 1.7 mg/0.75 mL pnij      5.  Primary hypertension  I10 401.9 CBC W/O DIFF      LIPID PANEL

## 2023-01-16 ENCOUNTER — DOCUMENTATION ONLY (OUTPATIENT)
Dept: INTERNAL MEDICINE CLINIC | Age: 53
End: 2023-01-16

## 2023-02-04 DIAGNOSIS — R73.01 IFG (IMPAIRED FASTING GLUCOSE): Primary | ICD-10-CM

## 2023-02-05 DIAGNOSIS — I10 PRIMARY HYPERTENSION: Primary | ICD-10-CM

## 2023-02-07 DIAGNOSIS — I10 PRIMARY HYPERTENSION: Primary | ICD-10-CM

## 2023-02-07 DIAGNOSIS — E03.9 ACQUIRED HYPOTHYROIDISM: Primary | ICD-10-CM

## 2023-03-01 ENCOUNTER — OFFICE VISIT (OUTPATIENT)
Age: 53
End: 2023-03-01
Payer: COMMERCIAL

## 2023-03-01 VITALS
WEIGHT: 193 LBS | HEART RATE: 60 BPM | BODY MASS INDEX: 32.15 KG/M2 | TEMPERATURE: 97.5 F | HEIGHT: 65 IN | OXYGEN SATURATION: 99 %

## 2023-03-01 DIAGNOSIS — M62.838 OTHER MUSCLE SPASM: ICD-10-CM

## 2023-03-01 DIAGNOSIS — M54.2 CERVICALGIA: ICD-10-CM

## 2023-03-01 DIAGNOSIS — M79.18 MYOFASCIAL PAIN: ICD-10-CM

## 2023-03-01 DIAGNOSIS — M47.816 SPONDYLOSIS WITHOUT MYELOPATHY OR RADICULOPATHY, LUMBAR REGION: Primary | ICD-10-CM

## 2023-03-01 PROCEDURE — 99213 OFFICE O/P EST LOW 20 MIN: CPT | Performed by: PHYSICAL MEDICINE & REHABILITATION

## 2023-03-01 RX ORDER — METHOCARBAMOL 500 MG/1
TABLET, FILM COATED ORAL
COMMUNITY
Start: 2023-01-10

## 2023-03-01 RX ORDER — ESTRADIOL 0.07 MG/D
FILM, EXTENDED RELEASE TRANSDERMAL
COMMUNITY
Start: 2022-12-15

## 2023-03-01 RX ORDER — DEXLANSOPRAZOLE 60 MG/1
CAPSULE, DELAYED RELEASE ORAL
COMMUNITY

## 2023-03-01 RX ORDER — METHYLPREDNISOLONE 4 MG/1
TABLET ORAL
Qty: 1 KIT | Refills: 0 | Status: SHIPPED | OUTPATIENT
Start: 2023-03-01

## 2023-03-01 RX ORDER — OMEPRAZOLE 20 MG/1
TABLET, DELAYED RELEASE ORAL
COMMUNITY

## 2023-03-01 RX ORDER — ATOMOXETINE 25 MG/1
CAPSULE ORAL
COMMUNITY
Start: 2023-01-29

## 2023-03-01 RX ORDER — GALCANEZUMAB 120 MG/ML
INJECTION, SOLUTION SUBCUTANEOUS
COMMUNITY
Start: 2023-01-30

## 2023-03-01 RX ORDER — DEXLANSOPRAZOLE 60 MG/1
CAPSULE, DELAYED RELEASE ORAL
COMMUNITY
Start: 2023-01-14

## 2023-03-01 RX ORDER — LOSARTAN POTASSIUM 50 MG/1
TABLET ORAL
COMMUNITY
Start: 2022-12-01

## 2023-03-01 RX ORDER — DULOXETIN HYDROCHLORIDE 60 MG/1
CAPSULE, DELAYED RELEASE ORAL
COMMUNITY

## 2023-03-01 ASSESSMENT — PATIENT HEALTH QUESTIONNAIRE - PHQ9
SUM OF ALL RESPONSES TO PHQ QUESTIONS 1-9: 0
SUM OF ALL RESPONSES TO PHQ QUESTIONS 1-9: 0
1. LITTLE INTEREST OR PLEASURE IN DOING THINGS: 0
SUM OF ALL RESPONSES TO PHQ QUESTIONS 1-9: 0
SUM OF ALL RESPONSES TO PHQ QUESTIONS 1-9: 0
2. FEELING DOWN, DEPRESSED OR HOPELESS: 0
SUM OF ALL RESPONSES TO PHQ9 QUESTIONS 1 & 2: 0

## 2023-03-01 NOTE — PROGRESS NOTES
[facility]  [address] [place], [facility] [address]  Phone: [phone]  Fax: [phone]    Pt's YOB: 1970    ASSESSMENT   Destiny Lu was seen today for back pain. Diagnoses and all orders for this visit:    Spondylosis without myelopathy or radiculopathy, lumbar region  -     methylPREDNISolone (MEDROL DOSEPACK) 4 MG tablet; Per dose pack instructions. Other muscle spasm    Cervicalgia    Myofascial pain       IMPRESSION AND PLAN:  Sandra Preston is a 45 y.o. female with history of lumbar and cervical pain and presents to the office today for follow up. Pt continues to complain of pain in the left lumbar region with radicular symptoms into the upper anterior aspect of the left thigh and radicular pain into the mid back, improved since her last office visit. She is taking Neurontin 100 mg 2 caps QHS with sedation, secondary to restless leg syndrome, and Robaxin 500 MG 1 tab BID-TID prn for muscle spasms. 1) Pt was given information on upper back and low back arthritis exercises. 2) She will continue taking Robaxin 500 MG 1 tab BID-TID prn for muscle spasms and does not require a refill at this time. 3) A Medrol Dosepak was prescribed for acute inflammatory pain. 4) Ms. Deborah Khoury has a reminder for a \"due or due soon\" health maintenance. I have asked that she contact her primary care provider, Adolfo Polanco MD, for follow-up on this health maintenance. 5)  demonstrated consistency with prescribing. 6) Pt would like to defer from NSAID's due to hx of migraines and rebound headaches. Return in about 6 months (around 9/1/2023) for follow up. HISTORY OF PRESENT ILLNESS:  Sandra Preston is a 45 y.o. female with history of lumbar and cervical pain and presents to the office today for follow up.  Pt continues to complain of pain in the left lumbar region with radicular symptoms into the upper anterior aspect of the left thigh and radicular pain into the mid back, improved since her last office visit. She reports with previously prescribed muscle relaxer and Medrol Dosepak her symptoms are managed well. Pt confirms she was not able to undergo dry needling due to her neurologist not feeling comfortable ordering it for her. She admits to previously undergoing Botox injections secondary to migraine headaches with increased cervical pain. Pt notes she has been prescribed Hydrochlorothiazide 25 MG secondary to high blood pressure. She further states she invested in an inversion table but has not used it yet. She is taking Neurontin 100 mg 2 caps QHS with sedation, secondary to restless leg syndrome. Pt confirms she would like to defer on NSAID's and pain medication secondary to migraines and rebound headaches. Pt at this time desires to continue with current care. Pain Scale: 1/10    PCP: Renae Lane MD         Diagnosis Date    Alcohol abuse     Anemia, iron deficiency 08/2021    saw Dr Nidhi Smith years Dr. Zuri Remy;   Lezama-Ward 2011(?); then llumb    Constipation     COVID-19 virus infection     regeneron (12/21); paxlovid (6/22)    Degenerative disc disease, cervical 2009    C5-6 Dr Aranda Screws 2019    Dr Scot Hawkins    Gastritis 12/2012    Dr Kelley Bethel 10/21    GERD (gastroesophageal reflux disease) 2009    Leora Tiwari, last EGD 12/12    H/O bone graft 05/22/2017    Dental bone graft for dental implant    Hydronephrosis of right kidney     Dr Mcneil Bolus    Hypertension     Hypothyroidism     Dr Kathy Yip     IFG (impaired fasting glucose) 12/2020    Interstitial cystitis     possible per gyn    Lumbar degenerative disc disease 01/2014    L4-5 mild degen disc w mild central stenosis    Migraine     Dr. Dav Siu, saw Dr. Caroline Alvarado also; Dr Mason Castro the Dr Philip Ricardo for botox; now aimovig    Obesity (BMI 30-39.9) 03/23/2018    PAIGE (obstructive sleep apnea)     Dr Rosmery Sierra; home sleep test showed AHI 3 but possibly inaccurate due to tech issues; RLS    PAIGE (obstructive sleep apnea) ? on cpap although she's not using Dr. Sherry Rachel, knee     Dr. Blake Horton    Osteopenia     DEXA t score 0.4 spine, -1.6 hip (); -0.3 spine, -1.5 hip w FRAX 3.4/0.3 (4/15); w/u neg for secondary causes    PPD positive, treated 1980    RLS (restless legs syndrome) 10/2022    Dr Kp Gibbons; neurontin    Thyroid nodule     Dr Dagoberto Martines; left 5mm ; no change , , ;  4mm [facility]    Venous insufficiency 2017        Social History     Tobacco Use    Smoking status: Former     Packs/day: 1.00     Types: Cigarettes     Quit date: 1987     Years since quittin.1    Smokeless tobacco: Never   Substance Use Topics    Alcohol use: Not Currently    Drug use: No     Types: Marijuana Khadar Saris)          Current Outpatient Medications:     atomoxetine (STRATTERA) 25 MG capsule, TAKE 1 CAPSULE BY MOUTH TWICE DAILY, Disp: , Rfl:     dexlansoprazole (DEXILANT) 60 MG CPDR delayed release capsule, Take 1 capsule every day by oral route for 56 days. , Disp: , Rfl:     dexlansoprazole (DEXILANT) 60 MG CPDR delayed release capsule, TAKE 1 CAPSULE BY MOUTH EVERY DAY, Disp: , Rfl:     DULoxetine (CYMBALTA) 60 MG extended release capsule, Take 1 capsule every day by oral route., Disp: , Rfl:     estradiol (VIVELLE) 0.075 MG/24HR, APPLY TWICE A WEEK AS DIRECTED, Disp: , Rfl:     estradiol-norethindrone (COMBIPATCH) 0.05-0.14 MG/DAY, Apply 1 patch twice a week by transdermal route., Disp: , Rfl:     EMGALITY 120 MG/ML SOSY, , Disp: , Rfl:     losartan (COZAAR) 50 MG tablet, TAKE 1 TABLET BY MOUTH DAILY, Disp: , Rfl:     methocarbamol (ROBAXIN) 500 MG tablet, Take 1 tab by mouth BID-TID as needed for muscle spasm., Disp: , Rfl:     methocarbamol (ROBAXIN) 500 MG tablet, TAKE 1 TABLET BY MOUTH 2 TO 3 TIMES DAILY AS NEEDED FOR MUSCLE SPASM, Disp: , Rfl:     omeprazole (PRILOSEC OTC) 20 MG tablet, Take by oral route., Disp: , Rfl:     VORTIoxetine (TRINTELLIX) 10 MG TABS tablet, , Disp: , Rfl:     methylPREDNISolone (MEDROL DOSEPACK) 4 MG tablet, Per dose pack instructions. , Disp: 1 kit, Rfl: 0    acetaminophen (TYLENOL) 325 MG tablet, Take by mouth every 4 hours as needed, Disp: , Rfl:     ascorbic acid (VITAMIN C) 250 MG tablet, Take 1,600 mg by mouth daily, Disp: , Rfl:     Cholecalciferol 50 MCG (2000 UT) CAPS, Take 1,000 Units by mouth daily, Disp: , Rfl:     desvenlafaxine succinate (PRISTIQ) 25 MG TB24 extended release tablet, TAKE 1 TABLET BY MOUTH IN THE MORNING FOR 2 WEEKS, Disp: , Rfl:     diclofenac sodium (VOLTAREN) 1 % GEL, Apply 2 g topically every 6 hours as needed, Disp: , Rfl:     docusate (COLACE, DULCOLAX) 100 MG CAPS, Take 1,600 mg by mouth 2 times daily, Disp: , Rfl:     Erenumab-aooe (AIMOVIG) 70 MG/ML SOAJ, Inject 1 mL into the skin every 30 days, Disp: , Rfl:     estradiol (VIVELLE) 0.0375 MG/24HR, Place 1 patch onto the skin, Disp: , Rfl:     famotidine (PEPCID) 40 MG tablet, ceived the following from [facility]: Outside name: famotidine (PEPCID) 40 mg tablet, Disp: , Rfl:     fluticasone (FLONASE SENSIMIST) 27.5 MCG/SPRAY nasal spray, 1 spray by Nasal route daily, Disp: , Rfl:     furosemide (LASIX) 20 MG tablet, Take once daily as needed for edema, Disp: , Rfl:     gabapentin (NEURONTIN) 100 MG capsule, TAKE 2 CAPSULES BY MOUTH EVERY NIGHT 1 HOUR BEFORE BEDTIME, Disp: , Rfl:     hydroCHLOROthiazide (HYDRODIURIL) 25 MG tablet, Take 25 mg by mouth daily, Disp: , Rfl:     ibuprofen (ADVIL;MOTRIN) 200 MG tablet, Take 600 mg by mouth every 8 hours as needed, Disp: , Rfl:     iron polysaccharide complex-B12-folic acid (POLY-IRON 323 FORTE) 150-0.025-1 MG CAPS capsule, Take 1 capsule by mouth daily, Disp: , Rfl:     losartan (COZAAR) 100 MG tablet, Take 100 mg by mouth daily, Disp: , Rfl:     melatonin 3 MG TABS tablet, Take 5 mg by mouth, Disp: , Rfl:     nadolol (CORGARD) 40 MG tablet, Take 40 mg by mouth, Disp: , Rfl: Plecanatide (TRULANCE) 3 MG TABS, TAKE 1 TABLET BY MOUTH DAILY WITH OR WITHOUT FOOD FOR CONSTIPATION, Disp: , Rfl:     progesterone (PROMETRIUM) 200 MG CAPS capsule, 400 mg, Disp: , Rfl:     rizatriptan (MAXALT) 10 MG tablet, TAKE 1 TABLET BY MOUTH 1 TIME AS NEEDED FOR MIGRAINE. MAY REPEAT IN 2 HOURS AS NEEDED, Disp: , Rfl:     Semaglutide, 1 MG/DOSE, 2 MG/1.5ML SOPN, Inject 1 mg into the skin every 7 days, Disp: , Rfl:     SUMAtriptan (IMITREX) 100 MG tablet, TAKE 1 TABLET BY MOUTH AT ONSET OF MIGRAINE, Disp: , Rfl:     thyroid (ARMOUR) 15 MG tablet, TAKE 1 TABLET BY MOUTH EVERY MORNING WITH 60 MG, Disp: , Rfl:     thyroid (ARMOUR) 60 MG tablet, TAKE 1 TABLET BY MOUTH EVERY MORNING WITH 15 MG, Disp: , Rfl:     valACYclovir (VALTREX) 1 g tablet, TAKE 1 TABLET BY MOUTH EVERY DAY AS DIRECTED, Disp: , Rfl:      Allergies   Allergen Reactions    Tetracyclines & Related Other (See Comments)     Pt states that she had vomiting at that time but was in her 25s  Other reaction(s): vomiting         REVIEW OF SYSTEMS    Constitutional: Negative for fever, chills, or weight change. Respiratory: Negative for cough or shortness of breath. Cardiovascular: Negative for chest pain or palpitations. Gastrointestinal: Negative for acid reflux, change in bowel habits, or constipation. Genitourinary: Negative for dysuria and flank pain. Musculoskeletal: Positive for mid-back and lumbar pain. Skin: Negative for rash. Neurological: Negative for headaches, dizziness, or numbness. Endo/Heme/Allergies: Negative for increased bruising. Psychiatric/Behavioral: Negative for difficulty with sleep. As per HPI    PHYSICAL EXAMINATION  Pulse 60   Temp 97.5 °F (36.4 °C) (Temporal)   Ht 5' 5\" (1.651 m)   Wt 193 lb (87.5 kg)   LMP  (LMP Unknown)   SpO2 99%   BMI 32.12 kg/m²     Constitutional: Awake, alert, and in no acute distress. Neurological: 1+ symmetrical DTRs in the upper extremities.  1+ symmetrical DTRs in the lower extremities. Sensation to light touch is intact. Negative Villarreal's sign bilaterally. Skin: warm, dry, and intact. Musculoskeletal: No pain with extension, axial loading, or forward flexion. No pain with internal or external rotation of her hips. Negative straight leg raise bilaterally. Minimal tightness across the trapezius      Biceps  Triceps Deltoids Wrist Ext Wrist Flex Hand Intrin   Right +4/5 +4/5 +4/5 +4/5 +4/5 +4/5   Left +4/5 +4/5 +4/5 +4/5 +4/5 +4/5      Hip Flex  Quads Hamstrings Ankle DF EHL Ankle PF   Right +4/5 +4/5 +4/5 +4/5 +4/5 +4/5   Left +4/5 +4/5 +4/5 +4/5 +4/5 +4/5     IMAGING:    Lumbar spine MRI from 12/20/2022 was personally reviewed with the patient and demonstrated:  FINDINGS:     Postoperative : None. Vertebral alignment: Normal.     Vertebral body heights: Normal.     Marrow signal: Normal.     Cord: Conus medullaris  terminates at the L1 level with normal signal.     Soft tissues: Severe right hydronephrosis, partially imaged, also seen on CT 2020. Mild posterior paraspinous muscular atrophy. Correlation of axial and sagittal data through the disc  levels:     -L1-2: Mild posterior disc bulge. No central stenosis. Small bilateral facet joint effusion. No foraminal stenosis. -L2-3: Mild posterior disc bulge with posterolateral corner disc protrusion. No central stenosis. Trace left  facet joint effusion. No foraminal stenosis. -L3-4: Mild posterior disc bulge. Mild posterior epidural fat. No central stenosis. Bilateral facet joint effusion. No foraminal stenosis. -L4-5: Posterior disc bulge. Posterior epidural  fat. More severe facet and ligamentous hypertrophy. AP canal measures 5 mm, moderately severe central stenosis. Mild facet inflammation. No foraminal stenosis. -L5-S1: Mild posterior disc bulge. No central stenosis. Facet and ligamentous hypertrophy. Facet degenerative joint disease. No foraminal stenosis. IMPRESSION   1.  Degenerative disc disease is mild throughout. However, when combined with posterior epidural fat and  facet hypertrophy, there is moderately severe central stenosis  at L4-L5 as described. 2. Facet disease with joint effusion and inflammation. 3. Severe right hydronephrosis, partially imaged. Clinical correlation? Seen on CT 2020      Lumbar spine 2V x-rays from 09/27/2022 were personally reviewed and demonstrated:  Multi-level degenerative facets, mild ddd L5/S1      AP Pelvis X-rays from 11/12/2019 were personally reviewed and demonstrated:   IUD is present, no significant degenerative changes. Cervical spine 2V x-rays from 12/17/2018 were personally reviewed and demonstrated:  Straightening of the cervical spine. Mild degenerative facets in the lower cervical region. Cervical spine MRI from 09/26/2013 was personally reviewed and demonstrated:   FINDINGS:       The visualized posterior fossa contents look normal. The prevertebral soft   tissues look normal.       The cervical cord is normal in signal and caliber. There is mild reversal of the normal cervical lordosis. No significant   listhesis. Vertebral body heights are maintained. No pathologic signal   abnormality within the bone marrow. There is mild edema in the posterior soft   tissues in the region of the upper cervical spine, reference sagittal inversion   recovery image   7. Disc heights are preserved. C1-2: Normal       C2-3: Normal       C3-4: Normal       C4-5: Minimal posterior disc bulge. No significant foraminal or canal stenosis. C5-6: Mild uncovertebral and facet arthropathy, without significant canal or   foraminal stenosis. C6-7: Minimal posterior disc bulge. Mild uncovertebral and facet arthropathy. Posterior ligamentous thickening. Canal remains patent. Mild left foraminal   narrowing. C7-T1: Normal       IMPRESSION:   Mild multilevel degenerative changes, without critical canal stenosis.  Mild   left foraminal narrowing at C6-7. Mild edema in the posterior soft tissues of the upper cervical spine may   represent mild soft tissue injury. No evidence of ligamentous trauma. Written by Tricia Dsouza, as dictated by Chapis Alan MD.  I, Dr. Chapis Alan confirm that all documentation is accurate.

## 2023-04-04 ENCOUNTER — HOSPITAL ENCOUNTER (OUTPATIENT)
Facility: HOSPITAL | Age: 53
Discharge: HOME | End: 2023-04-07
Payer: COMMERCIAL

## 2023-04-04 DIAGNOSIS — Z78.0 ASYMPTOMATIC MENOPAUSAL STATE: ICD-10-CM

## 2023-04-04 PROCEDURE — 77080 DXA BONE DENSITY AXIAL: CPT

## 2023-05-11 ENCOUNTER — TELEPHONE (OUTPATIENT)
Age: 53
End: 2023-05-11

## 2023-05-11 DIAGNOSIS — N30.10 INTERSTITIAL CYSTITIS: Primary | ICD-10-CM

## 2023-05-11 NOTE — TELEPHONE ENCOUNTER
Patient called and states she has been having urinary problems, in the past she saw Dr Yuridia Davis at [facility] who suggested it was interstitial cystitis and right kidney hydronephrosis. She states she hasn't seen them in a while but is having issues again, she tried to schedule an appt with them but they need a new referral from Dr Christofer Vazquez. .    She is requesting if that can be placed and faxed to them at [phone]?     Please advise, thank you

## 2023-05-12 ENCOUNTER — TELEPHONE (OUTPATIENT)
Age: 53
End: 2023-05-12

## 2023-05-12 NOTE — TELEPHONE ENCOUNTER
----- Message from Mckenna Daly sent at 5/12/2023  1:09 PM EDT -----  Subject: Message to Provider    QUESTIONS  Information for Provider? pt needing to change lab appt on the 20th, she   is asking if she could have it switched to the Wednesday before, please   follow up   ---------------------------------------------------------------------------  --------------  [id number] [employer]  [phone]; OK to leave message on voicemail  ---------------------------------------------------------------------------  --------------  SCRIPT ANSWERS  Relationship to Patient?  Self

## 2023-06-19 NOTE — TELEPHONE ENCOUNTER
Previous refill per chart  hydroCHLOROthiazide (HYDRODIURIL) 25 mg tablet 30 Tablet 5 12/21/2022     Sig - Route:  Take 1 Tablet by mouth daily. - Oral    Sent to pharmacy as: hydroCHLOROthiazide 25 mg tablet (HYDRODIURIL)    E-Prescribing Status: Receipt confirmed by pharmacy (12/21/2022  3:54 PM EST)

## 2023-06-20 RX ORDER — HYDROCHLOROTHIAZIDE 25 MG/1
25 TABLET ORAL DAILY
Qty: 90 TABLET | Refills: 3 | Status: SHIPPED | OUTPATIENT
Start: 2023-06-20

## 2023-07-20 ENCOUNTER — HOSPITAL ENCOUNTER (OUTPATIENT)
Facility: HOSPITAL | Age: 53
Setting detail: SPECIMEN
Discharge: HOME | End: 2023-07-20
Payer: COMMERCIAL

## 2023-07-20 DIAGNOSIS — E03.9 ACQUIRED HYPOTHYROIDISM: ICD-10-CM

## 2023-07-20 DIAGNOSIS — R73.01 IFG (IMPAIRED FASTING GLUCOSE): ICD-10-CM

## 2023-07-20 DIAGNOSIS — I10 PRIMARY HYPERTENSION: ICD-10-CM

## 2023-07-20 LAB
ALBUMIN SERPL-MCNC: 3.8 G/DL (ref 3.4–5)
ALBUMIN/GLOB SERPL: 1.3 (ref 0.8–1.7)
ALP SERPL-CCNC: 35 U/L (ref 45–117)
ALT SERPL-CCNC: 18 U/L (ref 13–56)
ANION GAP SERPL CALC-SCNC: 5 MMOL/L (ref 3–18)
AST SERPL-CCNC: 14 U/L (ref 10–38)
BILIRUB SERPL-MCNC: 0.7 MG/DL (ref 0.2–1)
BUN SERPL-MCNC: 11 MG/DL (ref 7–18)
BUN/CREAT SERPL: 14 (ref 12–20)
CALCIUM SERPL-MCNC: 9.3 MG/DL (ref 8.5–10.1)
CHLORIDE SERPL-SCNC: 105 MMOL/L (ref 100–111)
CHOLEST SERPL-MCNC: 181 MG/DL
CO2 SERPL-SCNC: 29 MMOL/L (ref 21–32)
CREAT SERPL-MCNC: 0.78 MG/DL (ref 0.6–1.3)
ERYTHROCYTE [DISTWIDTH] IN BLOOD BY AUTOMATED COUNT: 12.8 % (ref 11.6–14.5)
GLOBULIN SER CALC-MCNC: 2.9 G/DL (ref 2–4)
GLUCOSE SERPL-MCNC: 101 MG/DL (ref 74–99)
HCT VFR BLD AUTO: 40.2 % (ref 35–45)
HDLC SERPL-MCNC: 54 MG/DL (ref 40–60)
HDLC SERPL: 3.4 (ref 0–5)
HGB BLD-MCNC: 13.7 G/DL (ref 12–16)
LDLC SERPL CALC-MCNC: 105.8 MG/DL (ref 0–100)
LIPID PANEL: ABNORMAL
MCH RBC QN AUTO: 32.5 PG (ref 24–34)
MCHC RBC AUTO-ENTMCNC: 34.1 G/DL (ref 31–37)
MCV RBC AUTO: 95.3 FL (ref 78–100)
NRBC # BLD: 0 K/UL (ref 0–0.01)
NRBC BLD-RTO: 0 PER 100 WBC
PLATELET # BLD AUTO: 321 K/UL (ref 135–420)
PMV BLD AUTO: 10.2 FL (ref 9.2–11.8)
POTASSIUM SERPL-SCNC: 4 MMOL/L (ref 3.5–5.5)
PROT SERPL-MCNC: 6.7 G/DL (ref 6.4–8.2)
RBC # BLD AUTO: 4.22 M/UL (ref 4.2–5.3)
SODIUM SERPL-SCNC: 139 MMOL/L (ref 136–145)
TRIGL SERPL-MCNC: 106 MG/DL
TSH SERPL DL<=0.05 MIU/L-ACNC: 0.11 UIU/ML (ref 0.36–3.74)
VLDLC SERPL CALC-MCNC: 21.2 MG/DL
WBC # BLD AUTO: 5.3 K/UL (ref 4.6–13.2)

## 2023-07-20 PROCEDURE — 36415 COLL VENOUS BLD VENIPUNCTURE: CPT

## 2023-07-20 PROCEDURE — 80061 LIPID PANEL: CPT

## 2023-07-20 PROCEDURE — 80053 COMPREHEN METABOLIC PANEL: CPT

## 2023-07-20 PROCEDURE — 85027 COMPLETE CBC AUTOMATED: CPT

## 2023-07-20 PROCEDURE — 84443 ASSAY THYROID STIM HORMONE: CPT

## 2023-07-25 ENCOUNTER — OFFICE VISIT (OUTPATIENT)
Age: 53
End: 2023-07-25
Payer: COMMERCIAL

## 2023-07-25 VITALS
BODY MASS INDEX: 27.57 KG/M2 | SYSTOLIC BLOOD PRESSURE: 111 MMHG | HEART RATE: 56 BPM | DIASTOLIC BLOOD PRESSURE: 61 MMHG | TEMPERATURE: 98.2 F | OXYGEN SATURATION: 99 % | HEIGHT: 65 IN | WEIGHT: 165.5 LBS | RESPIRATION RATE: 20 BRPM

## 2023-07-25 DIAGNOSIS — G43.909 MIGRAINE WITHOUT STATUS MIGRAINOSUS, NOT INTRACTABLE, UNSPECIFIED MIGRAINE TYPE: ICD-10-CM

## 2023-07-25 DIAGNOSIS — E78.5 HYPERLIPIDEMIA, UNSPECIFIED HYPERLIPIDEMIA TYPE: ICD-10-CM

## 2023-07-25 DIAGNOSIS — R73.01 IFG (IMPAIRED FASTING GLUCOSE): ICD-10-CM

## 2023-07-25 DIAGNOSIS — F50.2 BULIMIA: ICD-10-CM

## 2023-07-25 DIAGNOSIS — E66.9 OBESITY (BMI 30-39.9): ICD-10-CM

## 2023-07-25 DIAGNOSIS — I10 PRIMARY HYPERTENSION: Primary | ICD-10-CM

## 2023-07-25 DIAGNOSIS — E03.9 ACQUIRED HYPOTHYROIDISM: ICD-10-CM

## 2023-07-25 PROCEDURE — 99214 OFFICE O/P EST MOD 30 MIN: CPT | Performed by: INTERNAL MEDICINE

## 2023-07-25 PROCEDURE — 3078F DIAST BP <80 MM HG: CPT | Performed by: INTERNAL MEDICINE

## 2023-07-25 PROCEDURE — 3074F SYST BP LT 130 MM HG: CPT | Performed by: INTERNAL MEDICINE

## 2023-07-25 RX ORDER — LOSARTAN POTASSIUM 25 MG/1
25 TABLET ORAL DAILY
Qty: 90 TABLET | Refills: 1 | Status: SHIPPED | OUTPATIENT
Start: 2023-07-25

## 2023-07-25 SDOH — ECONOMIC STABILITY: HOUSING INSECURITY
IN THE LAST 12 MONTHS, WAS THERE A TIME WHEN YOU DID NOT HAVE A STEADY PLACE TO SLEEP OR SLEPT IN A SHELTER (INCLUDING NOW)?: NO

## 2023-07-25 SDOH — ECONOMIC STABILITY: FOOD INSECURITY: WITHIN THE PAST 12 MONTHS, YOU WORRIED THAT YOUR FOOD WOULD RUN OUT BEFORE YOU GOT MONEY TO BUY MORE.: NEVER TRUE

## 2023-07-25 SDOH — ECONOMIC STABILITY: FOOD INSECURITY: WITHIN THE PAST 12 MONTHS, THE FOOD YOU BOUGHT JUST DIDN'T LAST AND YOU DIDN'T HAVE MONEY TO GET MORE.: NEVER TRUE

## 2023-07-25 SDOH — ECONOMIC STABILITY: INCOME INSECURITY: HOW HARD IS IT FOR YOU TO PAY FOR THE VERY BASICS LIKE FOOD, HOUSING, MEDICAL CARE, AND HEATING?: NOT HARD AT ALL

## 2023-08-03 ENCOUNTER — TELEPHONE (OUTPATIENT)
Age: 53
End: 2023-08-03

## 2023-08-29 NOTE — PROGRESS NOTES
[facility]  [facility] [place] [address], [address] [facility], [address] [place] [address] Dr  Phone: [phone]  Fax: [phone]    Pt's YOB: 1970    ASSESSMENT   Susan Garrett was seen today for lower back pain. Diagnoses and all orders for this visit:    Spondylosis without myelopathy or radiculopathy, lumbar region    Other muscle spasm    Radiculopathy, lumbar region    Left leg pain         IMPRESSION AND PLAN:  Annie Cunningham is a 45 y.o. female with history of lumbar and cervical pain and presents to the office today for lumbar and cervical pain follow up. Pt continues to complain of pain in the thoracic and left lumbar region with radicular symptoms into the the left thigh and right buttock with a new onset of a muscle strain in the left calf. She is taking Neurontin 100 mg 2 caps QHS with sedation and  Hydrochlorothiazide 25 MG for high blood pressure. 1) Pt was given information on herniated disc, low back arthritis and sacroiliac exercises. 2) I encouraged the patient to exercise regularly, 30 minutes a day, 5 days a week, incorporate weight resistance 2 x weekly, and try water exercise, rachel chi, and chair yoga as time permits. 3) I recommended Aspercreme for inflammatory symptoms. 4) Ms. Felipe Taylor has a reminder for a \"due or due soon\" health maintenance. I have asked that she contact her primary care provider, Deana Thomas MD, for follow-up on this health maintenance. 5)  demonstrated consistency with prescribing. 6) Pt would like to defer from NSAID's due to hx of migraines and rebound headaches. Return in about 3 months (around 11/30/2023) for Medication follow up. HISTORY OF PRESENT ILLNESS:  Annie Cunningham is a 45 y.o.  female with history of lumbar and cervical pain and presents to the office today for lumbar and cervical pain follow up.  Pt continues to complain of pain in the thoracic and left lumbar region with radicular symptoms into the

## 2023-08-30 ENCOUNTER — OFFICE VISIT (OUTPATIENT)
Age: 53
End: 2023-08-30
Payer: COMMERCIAL

## 2023-08-30 VITALS
WEIGHT: 176 LBS | HEIGHT: 65 IN | BODY MASS INDEX: 29.32 KG/M2 | DIASTOLIC BLOOD PRESSURE: 68 MMHG | OXYGEN SATURATION: 99 % | SYSTOLIC BLOOD PRESSURE: 115 MMHG | HEART RATE: 51 BPM | TEMPERATURE: 96.8 F

## 2023-08-30 DIAGNOSIS — M62.838 OTHER MUSCLE SPASM: ICD-10-CM

## 2023-08-30 DIAGNOSIS — M47.816 SPONDYLOSIS WITHOUT MYELOPATHY OR RADICULOPATHY, LUMBAR REGION: Primary | ICD-10-CM

## 2023-08-30 DIAGNOSIS — M79.605 LEFT LEG PAIN: ICD-10-CM

## 2023-08-30 DIAGNOSIS — M54.16 RADICULOPATHY, LUMBAR REGION: ICD-10-CM

## 2023-08-30 PROCEDURE — 99214 OFFICE O/P EST MOD 30 MIN: CPT | Performed by: PHYSICAL MEDICINE & REHABILITATION

## 2023-08-30 PROCEDURE — 3074F SYST BP LT 130 MM HG: CPT | Performed by: PHYSICAL MEDICINE & REHABILITATION

## 2023-08-30 PROCEDURE — 3078F DIAST BP <80 MM HG: CPT | Performed by: PHYSICAL MEDICINE & REHABILITATION

## 2023-08-30 RX ORDER — CETIRIZINE HYDROCHLORIDE 10 MG/1
10 TABLET ORAL DAILY
COMMUNITY

## 2023-08-30 RX ORDER — CALCIUM CARBONATE 500(1250)
500 TABLET ORAL DAILY
COMMUNITY

## 2023-08-30 RX ORDER — ESTRADIOL 0.07 MG/D
FILM, EXTENDED RELEASE TRANSDERMAL
COMMUNITY
Start: 2023-06-11

## 2023-08-30 RX ORDER — GABAPENTIN 300 MG/1
CAPSULE ORAL
COMMUNITY
Start: 2023-08-08

## 2023-08-30 NOTE — PROGRESS NOTES
Clark Dear presents today for   Chief Complaint   Patient presents with    Lower Back Pain       Is someone accompanying this pt? no    Is the patient using any DME equipment during OV? no    Coordination of Care:  1. Have you been to the ER, urgent care clinic since your last visit? no  Hospitalized since your last visit? no    2. Have you seen or consulted any other health care providers outside of the [address] [place] [address] since your last visit? Yes, GI Include any pap smears or colon screening.  no

## 2023-09-18 ENCOUNTER — TELEPHONE (OUTPATIENT)
Age: 53
End: 2023-09-18

## 2023-09-18 DIAGNOSIS — E66.9 OBESITY (BMI 30-39.9): ICD-10-CM

## 2023-09-18 DIAGNOSIS — R73.01 IFG (IMPAIRED FASTING GLUCOSE): Primary | ICD-10-CM

## 2023-09-18 NOTE — TELEPHONE ENCOUNTER
Patient called and states Dr Pepper Brooks told her to call when she is ready to restart the Ozempic. She is requesting for it to be sent to [id number] [facility]    Please advise, thank you.

## 2023-09-29 ENCOUNTER — PATIENT MESSAGE (OUTPATIENT)
Age: 53
End: 2023-09-29

## 2023-09-29 ENCOUNTER — TELEPHONE (OUTPATIENT)
Age: 53
End: 2023-09-29

## 2023-10-19 ENCOUNTER — TELEPHONE (OUTPATIENT)
Age: 53
End: 2023-10-19

## 2023-10-19 NOTE — TELEPHONE ENCOUNTER
----- Message from Annie Cunningham sent at 10/10/2023  6:05 PM EDT -----  Regarding: Erin Branch - 1 may be too much  Contact: [phone]  The pharmacy is awaiting prior authorization for the [address] [place] [address]. I asked them to resend.

## 2023-10-24 NOTE — TELEPHONE ENCOUNTER
Patient states she has not had a chance to have the 0.25 yet. Patient states she does not want to increase the dose at this time.

## 2024-01-03 ENCOUNTER — OFFICE VISIT (OUTPATIENT)
Age: 54
End: 2024-01-03
Payer: COMMERCIAL

## 2024-01-03 VITALS
BODY MASS INDEX: 32.15 KG/M2 | HEIGHT: 65 IN | OXYGEN SATURATION: 99 % | DIASTOLIC BLOOD PRESSURE: 73 MMHG | HEART RATE: 60 BPM | WEIGHT: 193 LBS | SYSTOLIC BLOOD PRESSURE: 128 MMHG

## 2024-01-03 DIAGNOSIS — M47.816 LUMBAR FACET ARTHROPATHY: ICD-10-CM

## 2024-01-03 DIAGNOSIS — M54.16 RADICULOPATHY, LUMBAR REGION: ICD-10-CM

## 2024-01-03 DIAGNOSIS — M48.061 SPINAL STENOSIS OF LUMBAR REGION WITHOUT NEUROGENIC CLAUDICATION: ICD-10-CM

## 2024-01-03 DIAGNOSIS — M62.838 OTHER MUSCLE SPASM: Primary | ICD-10-CM

## 2024-01-03 PROCEDURE — 3074F SYST BP LT 130 MM HG: CPT | Performed by: PHYSICAL MEDICINE & REHABILITATION

## 2024-01-03 PROCEDURE — 99214 OFFICE O/P EST MOD 30 MIN: CPT | Performed by: PHYSICAL MEDICINE & REHABILITATION

## 2024-01-03 PROCEDURE — 3078F DIAST BP <80 MM HG: CPT | Performed by: PHYSICAL MEDICINE & REHABILITATION

## 2024-01-03 RX ORDER — METHYLPREDNISOLONE 4 MG/1
TABLET ORAL
Qty: 1 KIT | Refills: 0 | Status: SHIPPED | OUTPATIENT
Start: 2024-01-03 | End: 2024-01-09

## 2024-01-03 RX ORDER — METHOCARBAMOL 500 MG/1
TABLET, FILM COATED ORAL
Qty: 60 TABLET | Refills: 2 | Status: SHIPPED | OUTPATIENT
Start: 2024-01-03

## 2024-01-03 NOTE — PROGRESS NOTES
BEDTIME, Disp: , Rfl:     calcium carbonate (OSCAL) 500 MG TABS tablet, Take 1 tablet by mouth daily, Disp: , Rfl:     cetirizine (ZYRTEC) 10 MG tablet, Take 1 tablet by mouth daily, Disp: , Rfl:     losartan (COZAAR) 25 MG tablet, Take 1 tablet by mouth daily, Disp: 90 tablet, Rfl: 1    hydroCHLOROthiazide (HYDRODIURIL) 25 MG tablet, TAKE 1 TABLET BY MOUTH DAILY, Disp: 90 tablet, Rfl: 3    Meth-Hyo-M Bl-Na Phos-Ph Sal (URIBEL) 118 MG CAPS, Take 1 capsule by mouth 4 times daily, Disp: 120 capsule, Rfl: 3    dexlansoprazole (DEXILANT) 60 MG CPDR delayed release capsule, Take 1 capsule every day by oral route for 56 days., Disp: , Rfl:     EMGALITY 120 MG/ML SOSY, , Disp: , Rfl:     VORTIoxetine (TRINTELLIX) 10 MG TABS tablet, , Disp: , Rfl:     acetaminophen (TYLENOL) 325 MG tablet, Take by mouth every 4 hours as needed, Disp: , Rfl:     ascorbic acid (VITAMIN C) 250 MG tablet, Take 1,600 mg by mouth daily, Disp: , Rfl:     Cholecalciferol 50 MCG (2000 UT) CAPS, Take 1,000 Units by mouth daily, Disp: , Rfl:     docusate (COLACE, DULCOLAX) 100 MG CAPS, Take 1,600 mg by mouth 2 times daily, Disp: , Rfl:     estradiol (VIVELLE) 0.0375 MG/24HR, Place 1 patch onto the skin, Disp: , Rfl:     famotidine (PEPCID) 40 MG tablet, ceived the following from [facility]: Outside name: famotidine (PEPCID) 40 mg tablet, Disp: , Rfl:     fluticasone (FLONASE SENSIMIST) 27.5 MCG/SPRAY nasal spray, 1 spray by Nasal route daily, Disp: , Rfl:     gabapentin (NEURONTIN) 100 MG capsule, , Disp: , Rfl:     ibuprofen (ADVIL;MOTRIN) 200 MG tablet, Take 3 tablets by mouth every 8 hours as needed, Disp: , Rfl:     iron polysaccharide complex-B12-folic acid (POLY-IRON 150 FORTE) 150-0.025-1 MG CAPS capsule, Take 1 capsule by mouth daily, Disp: , Rfl:     melatonin 3 MG TABS tablet, Take 5 mg by mouth, Disp: , Rfl:     nadolol (CORGARD) 40 MG tablet, Take 1 tablet by mouth, Disp: , Rfl:     Plecanatide (TRULANCE) 3 MG TABS, TAKE 1

## 2024-01-24 ENCOUNTER — HOSPITAL ENCOUNTER (OUTPATIENT)
Facility: HOSPITAL | Age: 54
Setting detail: SPECIMEN
Discharge: HOME | End: 2024-01-27
Payer: COMMERCIAL

## 2024-01-24 DIAGNOSIS — R73.01 IFG (IMPAIRED FASTING GLUCOSE): ICD-10-CM

## 2024-01-24 DIAGNOSIS — E78.5 HYPERLIPIDEMIA, UNSPECIFIED HYPERLIPIDEMIA TYPE: ICD-10-CM

## 2024-01-24 LAB
ANION GAP SERPL CALC-SCNC: 2 MMOL/L (ref 3–18)
BUN SERPL-MCNC: 20 MG/DL (ref 7–18)
BUN/CREAT SERPL: 23 (ref 12–20)
CALCIUM SERPL-MCNC: 9.9 MG/DL (ref 8.5–10.1)
CHLORIDE SERPL-SCNC: 103 MMOL/L (ref 100–111)
CHOLEST SERPL-MCNC: 184 MG/DL
CO2 SERPL-SCNC: 34 MMOL/L (ref 21–32)
CREAT SERPL-MCNC: 0.88 MG/DL (ref 0.6–1.3)
GLUCOSE SERPL-MCNC: 105 MG/DL (ref 74–99)
HBA1C MFR BLD: 5.2 % (ref 4.2–5.6)
HDLC SERPL-MCNC: 67 MG/DL (ref 40–60)
HDLC SERPL: 2.7 (ref 0–5)
LDLC SERPL CALC-MCNC: 95 MG/DL (ref 0–100)
LIPID PANEL: ABNORMAL
POTASSIUM SERPL-SCNC: 4.1 MMOL/L (ref 3.5–5.5)
SODIUM SERPL-SCNC: 139 MMOL/L (ref 136–145)
TRIGL SERPL-MCNC: 110 MG/DL
VLDLC SERPL CALC-MCNC: 22 MG/DL

## 2024-01-24 PROCEDURE — 36415 COLL VENOUS BLD VENIPUNCTURE: CPT

## 2024-01-24 PROCEDURE — 83036 HEMOGLOBIN GLYCOSYLATED A1C: CPT

## 2024-01-24 PROCEDURE — 80048 BASIC METABOLIC PNL TOTAL CA: CPT

## 2024-01-24 PROCEDURE — 80061 LIPID PANEL: CPT

## 2024-01-26 NOTE — PROGRESS NOTES
53 y.o. female who presents for evaluation.    Denies any chest pain, shortness of breath, PND, orthopnea, palpitations or syncope.  Tries to be active but no set exercise mainly due to motivational issues     Dr Nguyen is managing the thyroid regimen  no sx referable to that.  she is requesting follow up Us thyroid nodule    No new gi or gu issues    Still seeing Dr Johns who is managing the psych meds    She stopped the wegovy in July, her weight was down in the 165 range. She has not done well since as below     7/25/2023 8/30/2023 11/29/2023 1/3/2024 1/31/2024   Vitals        Weight - Scale 165 lb 8 oz  176 lb  176 lb  193 lb  193 lb      Doing well with the migraines    Past Medical History:   Diagnosis Date    Alcohol abuse     Anemia, iron deficiency 08/2021    saw Dr Tg Marie     X20 years Dr. Garcia;  LiseSylvia 2011(?); then llumb; now Dr Girard    Constipation     COVID-19 vaccine dose declined 01/2024    boosters    COVID-19 virus infection     regeneron (12/21); paxlovid (6/22); paxlo (1/24)    Degenerative disc disease, cervical 2009    C5-6 Dr Canales    Fibromyalgia 2019    Dr Mathews    Gastritis 12/2012    Dr Mas 10/21    GERD (gastroesophageal reflux disease) 2009    , last EGD 12/12    H/O bone graft 05/22/2017    Dental bone graft for dental implant    Hydronephrosis of right kidney     Dr Harden    Hyperlipidemia     Hypertension     Hypothyroidism     Dr Nguyen     IFG (impaired fasting glucose) 12/2020    Interstitial cystitis     possible per gyn    Lumbar degenerative disc disease 01/2014    L4-5 mild degen disc w mild central stenosis    Migraine     Dr. Canales, saw Dr. Go also; Dr Corona the Dr Stevenson for botox; now aimovig    Obesity (BMI 30-39.9) 03/23/2018    PAIGE (obstructive sleep apnea) 2011?    on cpap although she's not using Dr. Canales; Dr Hitchcock (2023) home sleep test showed AHI 3 but possibly inaccurate due to tech issues; RLS    Osteoarthritis, knee

## 2024-01-31 ENCOUNTER — OFFICE VISIT (OUTPATIENT)
Age: 54
End: 2024-01-31
Payer: COMMERCIAL

## 2024-01-31 VITALS
HEART RATE: 52 BPM | OXYGEN SATURATION: 99 % | WEIGHT: 193 LBS | RESPIRATION RATE: 16 BRPM | BODY MASS INDEX: 32.15 KG/M2 | TEMPERATURE: 97.4 F | SYSTOLIC BLOOD PRESSURE: 143 MMHG | HEIGHT: 65 IN | DIASTOLIC BLOOD PRESSURE: 87 MMHG

## 2024-01-31 DIAGNOSIS — E04.1 THYROID NODULE: ICD-10-CM

## 2024-01-31 DIAGNOSIS — E66.9 OBESITY (BMI 30-39.9): ICD-10-CM

## 2024-01-31 DIAGNOSIS — E78.5 HYPERLIPIDEMIA, UNSPECIFIED HYPERLIPIDEMIA TYPE: ICD-10-CM

## 2024-01-31 DIAGNOSIS — E03.9 ACQUIRED HYPOTHYROIDISM: ICD-10-CM

## 2024-01-31 DIAGNOSIS — R73.01 IFG (IMPAIRED FASTING GLUCOSE): Primary | ICD-10-CM

## 2024-01-31 DIAGNOSIS — I10 PRIMARY HYPERTENSION: ICD-10-CM

## 2024-01-31 PROCEDURE — 3079F DIAST BP 80-89 MM HG: CPT | Performed by: INTERNAL MEDICINE

## 2024-01-31 PROCEDURE — 3077F SYST BP >= 140 MM HG: CPT | Performed by: INTERNAL MEDICINE

## 2024-01-31 PROCEDURE — 99214 OFFICE O/P EST MOD 30 MIN: CPT | Performed by: INTERNAL MEDICINE

## 2024-01-31 ASSESSMENT — PATIENT HEALTH QUESTIONNAIRE - PHQ9
SUM OF ALL RESPONSES TO PHQ QUESTIONS 1-9: 0
SUM OF ALL RESPONSES TO PHQ9 QUESTIONS 1 & 2: 0
SUM OF ALL RESPONSES TO PHQ QUESTIONS 1-9: 0
1. LITTLE INTEREST OR PLEASURE IN DOING THINGS: 0
2. FEELING DOWN, DEPRESSED OR HOPELESS: 0

## 2024-01-31 NOTE — PROGRESS NOTES
Jerrica Owen presents today for   Chief Complaint   Patient presents with    Follow-up     6-month    Hypertension       Recently had COVID, at home test, beginning of January. 3rd time    1. \"Have you been to the ER, urgent care clinic since your last visit?  Hospitalized since your last visit?\" Yes    2. \"Have you seen or consulted any other health care providers outside of the [facility] since your last visit?\" Yes     3. For patients aged 45-75: Has the patient had a colonoscopy / FIT/ Cologuard? Yes - no Care Gap present      If the patient is female:    4. For patients aged 40-74: Has the patient had a mammogram within the past 2 years? Yes - no Care Gap present      5. For patients aged 21-65: Has the patient had a pap smear? Yes - no Care Gap present

## 2024-02-03 DIAGNOSIS — I10 PRIMARY HYPERTENSION: ICD-10-CM

## 2024-02-06 ENCOUNTER — TELEPHONE (OUTPATIENT)
Age: 54
End: 2024-02-06

## 2024-02-06 NOTE — TELEPHONE ENCOUNTER
Pt states a referral was placed for a US for her thyroid , she states it was suppose to be sent to [facility]  please resend

## 2024-02-07 RX ORDER — LOSARTAN POTASSIUM 25 MG/1
25 TABLET ORAL DAILY
Qty: 90 TABLET | Refills: 3 | Status: SHIPPED | OUTPATIENT
Start: 2024-02-07

## 2024-04-18 NOTE — PROGRESS NOTES
[facility]  [address] [place] [address]., [address]  [place] [address]  Phone: [phone]  Fax: [phone]     Pt's YOB: 1970    ASSESSMENT   Jerrica was seen today for back problem, pain, back pain and ankle pain.    Diagnoses and all orders for this visit:    Acute right ankle pain  -     diclofenac sodium (VOLTAREN) 1 % GEL; Apply 4 g topically 4 times daily Apply to right ankle qid as needed  -     methylPREDNISolone (MEDROL DOSEPACK) 4 MG tablet; Take by mouth.  -     External Referral To Physical Therapy    Cervical pain  -     methylPREDNISolone (MEDROL DOSEPACK) 4 MG tablet; Take by mouth.  -     [05010] C Spine 2-3V  -     External Referral To Physical Therapy    Other muscle spasm  -     External Referral To Physical Therapy    Spinal stenosis of lumbar region without neurogenic claudication    Lumbar facet arthropathy    Myofascial pain         IMPRESSION AND PLAN:  Jerrica Owen is a 53 y.o. RHD female with history of cervical and lumbar pain and presents to the office today for medication follow up. She is taking Robaxin 500 mg BID-TID PRN, Neurontin 100 mg 2 caps QHS, and Hydrochlorothiazide 25 mg.    Per last office note, pt has not had any spinal injections.    Ms. Owen has a reminder for a \"due or due soon\" health maintenance. I have asked that she contact her primary care provider, Freddie Corcoran MD, for follow-up on this health maintenance.   demonstrated consistency with prescribing.   Pt was prescribed an MDP for inflammatory pain  Patient was prescribed a Voltaren 1% gel for ankle pain  Patient was referred to ankle and cervical PT-- pt request [facility]  Cervical 2V x-rays were obtained and reviewed    Return in about 2 months (around 6/29/2024) for PT follow up, Medication follow up, Diagnostic follow up.        HISTORY OF PRESENT ILLNESS:  Jerrica Owen is a 53 y.o. RHD female with history of lumbar facet

## 2024-04-29 ENCOUNTER — OFFICE VISIT (OUTPATIENT)
Age: 54
End: 2024-04-29
Payer: COMMERCIAL

## 2024-04-29 VITALS
SYSTOLIC BLOOD PRESSURE: 132 MMHG | TEMPERATURE: 98.2 F | BODY MASS INDEX: 30.86 KG/M2 | HEART RATE: 60 BPM | HEIGHT: 65 IN | RESPIRATION RATE: 18 BRPM | OXYGEN SATURATION: 99 % | DIASTOLIC BLOOD PRESSURE: 73 MMHG | WEIGHT: 185.2 LBS

## 2024-04-29 DIAGNOSIS — M25.571 ACUTE RIGHT ANKLE PAIN: Primary | ICD-10-CM

## 2024-04-29 DIAGNOSIS — M54.2 CERVICAL PAIN: ICD-10-CM

## 2024-04-29 DIAGNOSIS — M47.816 LUMBAR FACET ARTHROPATHY: ICD-10-CM

## 2024-04-29 DIAGNOSIS — M79.18 MYOFASCIAL PAIN: ICD-10-CM

## 2024-04-29 DIAGNOSIS — M62.838 OTHER MUSCLE SPASM: ICD-10-CM

## 2024-04-29 DIAGNOSIS — M48.061 SPINAL STENOSIS OF LUMBAR REGION WITHOUT NEUROGENIC CLAUDICATION: ICD-10-CM

## 2024-04-29 PROCEDURE — 99214 OFFICE O/P EST MOD 30 MIN: CPT | Performed by: PHYSICAL MEDICINE & REHABILITATION

## 2024-04-29 PROCEDURE — 3078F DIAST BP <80 MM HG: CPT | Performed by: PHYSICAL MEDICINE & REHABILITATION

## 2024-04-29 PROCEDURE — 72040 X-RAY EXAM NECK SPINE 2-3 VW: CPT | Performed by: PHYSICAL MEDICINE & REHABILITATION

## 2024-04-29 PROCEDURE — 3075F SYST BP GE 130 - 139MM HG: CPT | Performed by: PHYSICAL MEDICINE & REHABILITATION

## 2024-04-29 RX ORDER — VILAZODONE HYDROCHLORIDE 20 MG/1
20 TABLET ORAL DAILY
COMMUNITY
Start: 2024-03-17

## 2024-04-29 RX ORDER — VILAZODONE HYDROCHLORIDE 40 MG/1
40 TABLET ORAL DAILY
COMMUNITY
Start: 2024-04-05

## 2024-04-29 RX ORDER — METHYLPREDNISOLONE 4 MG/1
TABLET ORAL
Qty: 1 KIT | Refills: 0 | Status: SHIPPED | OUTPATIENT
Start: 2024-04-29 | End: 2024-05-05

## 2024-04-29 NOTE — PROGRESS NOTES
Jerrica Owen presents today for   Chief Complaint   Patient presents with    Back Problem    Pain    Back Pain    Ankle Pain       Is someone accompanying this pt? no    Is the patient using any DME equipment during OV? no    Depression Screening:       No data to display                Learning Assessment:  Failed to redirect to the Timeline version of the zappit SmartLink.    Abuse Screening:       No data to display                Fall Risk  Failed to redirect to the Timeline version of the [employer]it SmartLink.    OPIOID RISK TOOL  Failed to redirect to the Timeline version of the [employer]it SmartLink.    Coordination of Care:  1. Have you been to the ER, urgent care clinic since your last visit? no  Hospitalized since your last visit? no    2. Have you seen or consulted any other health care providers outside of the [facility] since your last visit? no Include any pap smears or colon screening. no

## 2024-06-23 RX ORDER — HYDROCHLOROTHIAZIDE 25 MG/1
25 TABLET ORAL DAILY
Qty: 90 TABLET | Refills: 3 | Status: SHIPPED | OUTPATIENT
Start: 2024-06-23

## 2024-07-01 ENCOUNTER — OFFICE VISIT (OUTPATIENT)
Age: 54
End: 2024-07-01

## 2024-07-01 VITALS
BODY MASS INDEX: 31.89 KG/M2 | WEIGHT: 191.4 LBS | DIASTOLIC BLOOD PRESSURE: 72 MMHG | HEART RATE: 72 BPM | TEMPERATURE: 98.5 F | SYSTOLIC BLOOD PRESSURE: 126 MMHG | HEIGHT: 65 IN | RESPIRATION RATE: 18 BRPM

## 2024-07-01 DIAGNOSIS — M54.2 CERVICAL PAIN: ICD-10-CM

## 2024-07-01 DIAGNOSIS — M79.18 MYOFASCIAL PAIN: ICD-10-CM

## 2024-07-01 DIAGNOSIS — M47.816 LUMBAR FACET ARTHROPATHY: ICD-10-CM

## 2024-07-01 DIAGNOSIS — M62.838 OTHER MUSCLE SPASM: ICD-10-CM

## 2024-07-01 DIAGNOSIS — M48.061 SPINAL STENOSIS OF LUMBAR REGION WITHOUT NEUROGENIC CLAUDICATION: Primary | ICD-10-CM

## 2024-07-01 RX ORDER — MODAFINIL 100 MG/1
TABLET ORAL
COMMUNITY
Start: 2024-06-14

## 2024-07-01 RX ORDER — BUSPIRONE HYDROCHLORIDE 10 MG/1
TABLET ORAL
COMMUNITY
Start: 2024-06-28

## 2024-07-01 NOTE — PROGRESS NOTES
[facility]  [address] [place] [address], [address]  [place] [address]  Phone: [phone]  Fax: [phone]    Pt's YOB: 1970    ASSESSMENT   Jerrica was seen today for neck pain, lower back pain and ankle pain.    Diagnoses and all orders for this visit:    Spinal stenosis of lumbar region without neurogenic claudication  -     MRI LUMBAR SPINE WO CONTRAST; Future    Other muscle spasm  -     MRI LUMBAR SPINE WO CONTRAST; Future    Lumbar facet arthropathy  -     MRI LUMBAR SPINE WO CONTRAST; Future    Cervical pain    Myofascial pain         IMPRESSION AND PLAN:  Tay is 54 yo female with history of cervical and lumbar pain and lumbar spinal stenosis with some mild improvement with PT and dry needling; she will continue with PT and updated MRI will be obtained for further assessment of worsening spinal stenosis and for lumbar injections.    1) Pt will continue with Robaxin 500 mg for muscle spasm and does not need a refill  2) Lumbar MRI ordered to assess for worsening inflammation and spinal stenosis and for possible lumbar injections.  3) Pt will use ibuprofen 800 mg as needed for inflammation and does not need a refill at this time.  4) Ms. Owen has a reminder for a \"due or due soon\" health maintenance. I have asked that she contact her primary care provider, Freddie Corcoran MD, for follow-up on this health maintenance.  5)  demonstrated consistency with prescribing.   6)  Trigger point evaluation for lumbar physical therapy also requested     Return in about 6 weeks (around 8/12/2024) for PT follow up, Diagnostic follow up.        HISTORY OF PRESENT ILLNESS:  Jerrica Owen is a 53 y.o.  female with history of cervical and lumbar pain and presents to the office today for PT follow up.  Patient states that she has attended physical therapy since her last office visit and has had improvement with her right ankle pain.  She states she continues to have

## 2024-07-02 DIAGNOSIS — M62.838 OTHER MUSCLE SPASM: ICD-10-CM

## 2024-07-02 RX ORDER — METHOCARBAMOL 500 MG/1
TABLET, FILM COATED ORAL
Qty: 60 TABLET | Refills: 2 | OUTPATIENT
Start: 2024-07-02

## 2024-07-03 DIAGNOSIS — M47.816 LUMBAR FACET ARTHROPATHY: ICD-10-CM

## 2024-07-03 DIAGNOSIS — M48.061 SPINAL STENOSIS OF LUMBAR REGION WITHOUT NEUROGENIC CLAUDICATION: ICD-10-CM

## 2024-07-03 DIAGNOSIS — M62.838 OTHER MUSCLE SPASM: ICD-10-CM

## 2024-07-24 ENCOUNTER — HOSPITAL ENCOUNTER (OUTPATIENT)
Facility: HOSPITAL | Age: 54
Setting detail: SPECIMEN
Discharge: HOME | End: 2024-07-27
Payer: COMMERCIAL

## 2024-07-24 DIAGNOSIS — R73.01 IFG (IMPAIRED FASTING GLUCOSE): ICD-10-CM

## 2024-07-24 DIAGNOSIS — E03.9 ACQUIRED HYPOTHYROIDISM: ICD-10-CM

## 2024-07-24 LAB
ALBUMIN SERPL-MCNC: 3.9 G/DL (ref 3.4–5)
ALBUMIN/GLOB SERPL: 1.3 (ref 0.8–1.7)
ALP SERPL-CCNC: 46 U/L (ref 45–117)
ALT SERPL-CCNC: 17 U/L (ref 13–56)
ANION GAP SERPL CALC-SCNC: 6 MMOL/L (ref 3–18)
AST SERPL-CCNC: 14 U/L (ref 10–38)
BASOPHILS # BLD: 0.1 K/UL (ref 0–0.1)
BASOPHILS NFR BLD: 1 % (ref 0–2)
BILIRUB SERPL-MCNC: 0.8 MG/DL (ref 0.2–1)
BUN SERPL-MCNC: 16 MG/DL (ref 7–18)
BUN/CREAT SERPL: 20 (ref 12–20)
CALCIUM SERPL-MCNC: 9 MG/DL (ref 8.5–10.1)
CHLORIDE SERPL-SCNC: 105 MMOL/L (ref 100–111)
CO2 SERPL-SCNC: 29 MMOL/L (ref 21–32)
CREAT SERPL-MCNC: 0.82 MG/DL (ref 0.6–1.3)
DIFFERENTIAL METHOD BLD: ABNORMAL
EOSINOPHIL # BLD: 0.2 K/UL (ref 0–0.4)
EOSINOPHIL NFR BLD: 3 % (ref 0–5)
ERYTHROCYTE [DISTWIDTH] IN BLOOD BY AUTOMATED COUNT: 13 % (ref 11.6–14.5)
GLOBULIN SER CALC-MCNC: 2.9 G/DL (ref 2–4)
GLUCOSE SERPL-MCNC: 105 MG/DL (ref 74–99)
HBA1C MFR BLD: 5.4 % (ref 4.2–5.6)
HCT VFR BLD AUTO: 39.8 % (ref 35–45)
HGB BLD-MCNC: 13.4 G/DL (ref 12–16)
IMM GRANULOCYTES # BLD AUTO: 0 K/UL (ref 0–0.04)
IMM GRANULOCYTES NFR BLD AUTO: 0 % (ref 0–0.5)
LYMPHOCYTES # BLD: 2.2 K/UL (ref 0.9–3.6)
LYMPHOCYTES NFR BLD: 31 % (ref 21–52)
MCH RBC QN AUTO: 32.8 PG (ref 24–34)
MCHC RBC AUTO-ENTMCNC: 33.7 G/DL (ref 31–37)
MCV RBC AUTO: 97.5 FL (ref 78–100)
MONOCYTES # BLD: 0.7 K/UL (ref 0.05–1.2)
MONOCYTES NFR BLD: 9 % (ref 3–10)
NEUTS SEG # BLD: 3.9 K/UL (ref 1.8–8)
NEUTS SEG NFR BLD: 56 % (ref 40–73)
NRBC # BLD: 0 K/UL (ref 0–0.01)
NRBC BLD-RTO: 0 PER 100 WBC
PLATELET # BLD AUTO: 326 K/UL (ref 135–420)
PMV BLD AUTO: 9.6 FL (ref 9.2–11.8)
POTASSIUM SERPL-SCNC: 4.2 MMOL/L (ref 3.5–5.5)
PROT SERPL-MCNC: 6.8 G/DL (ref 6.4–8.2)
RBC # BLD AUTO: 4.08 M/UL (ref 4.2–5.3)
SODIUM SERPL-SCNC: 140 MMOL/L (ref 136–145)
WBC # BLD AUTO: 7 K/UL (ref 4.6–13.2)

## 2024-07-24 PROCEDURE — 80053 COMPREHEN METABOLIC PANEL: CPT

## 2024-07-24 PROCEDURE — 85025 COMPLETE CBC W/AUTO DIFF WBC: CPT

## 2024-07-24 PROCEDURE — 36415 COLL VENOUS BLD VENIPUNCTURE: CPT

## 2024-07-24 PROCEDURE — 83036 HEMOGLOBIN GLYCOSYLATED A1C: CPT

## 2024-07-30 ENCOUNTER — OFFICE VISIT (OUTPATIENT)
Facility: CLINIC | Age: 54
End: 2024-07-30
Payer: COMMERCIAL

## 2024-07-30 VITALS
HEIGHT: 65 IN | SYSTOLIC BLOOD PRESSURE: 125 MMHG | TEMPERATURE: 98 F | HEART RATE: 62 BPM | OXYGEN SATURATION: 99 % | DIASTOLIC BLOOD PRESSURE: 62 MMHG | BODY MASS INDEX: 31.16 KG/M2 | WEIGHT: 187 LBS | RESPIRATION RATE: 16 BRPM

## 2024-07-30 DIAGNOSIS — E78.5 HYPERLIPIDEMIA, UNSPECIFIED HYPERLIPIDEMIA TYPE: ICD-10-CM

## 2024-07-30 DIAGNOSIS — E03.9 ACQUIRED HYPOTHYROIDISM: ICD-10-CM

## 2024-07-30 DIAGNOSIS — L50.9 HIVES: ICD-10-CM

## 2024-07-30 DIAGNOSIS — E66.9 OBESITY (BMI 30-39.9): ICD-10-CM

## 2024-07-30 DIAGNOSIS — M79.675 PAIN OF TOE OF LEFT FOOT: Primary | ICD-10-CM

## 2024-07-30 DIAGNOSIS — I10 PRIMARY HYPERTENSION: ICD-10-CM

## 2024-07-30 DIAGNOSIS — R73.01 IFG (IMPAIRED FASTING GLUCOSE): ICD-10-CM

## 2024-07-30 PROCEDURE — 3078F DIAST BP <80 MM HG: CPT | Performed by: INTERNAL MEDICINE

## 2024-07-30 PROCEDURE — 3074F SYST BP LT 130 MM HG: CPT | Performed by: INTERNAL MEDICINE

## 2024-07-30 PROCEDURE — 99214 OFFICE O/P EST MOD 30 MIN: CPT | Performed by: INTERNAL MEDICINE

## 2024-07-30 RX ORDER — FEXOFENADINE HCL 180 MG/1
180 TABLET ORAL DAILY
COMMUNITY

## 2024-07-30 RX ORDER — LOSARTAN POTASSIUM 50 MG/1
50 TABLET ORAL DAILY
Qty: 90 TABLET | Refills: 1 | Status: SHIPPED | OUTPATIENT
Start: 2024-07-30

## 2024-07-30 RX ORDER — HYDROXYZINE HYDROCHLORIDE 25 MG/1
25 TABLET, FILM COATED ORAL EVERY 8 HOURS PRN
Qty: 30 TABLET | Refills: 0 | Status: SHIPPED | OUTPATIENT
Start: 2024-07-30 | End: 2024-08-09

## 2024-07-30 RX ORDER — PREDNISONE 10 MG/1
10 TABLET ORAL DAILY
Qty: 5 TABLET | Refills: 0 | Status: SHIPPED | OUTPATIENT
Start: 2024-07-30 | End: 2024-08-04

## 2024-07-30 SDOH — ECONOMIC STABILITY: FOOD INSECURITY: WITHIN THE PAST 12 MONTHS, THE FOOD YOU BOUGHT JUST DIDN'T LAST AND YOU DIDN'T HAVE MONEY TO GET MORE.: NEVER TRUE

## 2024-07-30 SDOH — ECONOMIC STABILITY: INCOME INSECURITY: HOW HARD IS IT FOR YOU TO PAY FOR THE VERY BASICS LIKE FOOD, HOUSING, MEDICAL CARE, AND HEATING?: NOT HARD AT ALL

## 2024-07-30 SDOH — ECONOMIC STABILITY: FOOD INSECURITY: WITHIN THE PAST 12 MONTHS, YOU WORRIED THAT YOUR FOOD WOULD RUN OUT BEFORE YOU GOT MONEY TO BUY MORE.: NEVER TRUE

## 2024-07-30 NOTE — PROGRESS NOTES
53 y.o. female who presents for evaluation.    Denies any chest pain, shortness of breath, PND, orthopnea, palpitations or syncope.  Tries to be active but no set exercise.  Now reports that she has been taking losartan 50 instead of 25, bp in great range when she checks    Dr Nguyen is managing the thyroid regimen still,  no sx referable to that.  Follow up US thyroid no change. She will be changing to [facility] for gyn care in the future    No new gi or gu issues    Dr Girard left [facility], they are doing virtual visits via [facility]    She stopped the wegovy in July due to cost     1/3/2024 1/31/2024 4/29/2024 6/18/2024 7/1/2024   Vitals        Weight - Scale 193 lb  193 lb  185 lb 3.2 oz  191 lb  191 lb 6.4 oz       7/30/2024   Vitals    Weight - Scale 187 lb      Doing well with the migraines    She's having left 2nd toe pain and asking for xray    Lastly, she's been having intermittent hives, will be traveling to [place] for a cruise in the near future    Past Medical History:   Diagnosis Date    Alcohol abuse     Anemia, iron deficiency 08/2021    saw Dr Tg Marie     X20 years Dr. Garcia;  Lezama[place]ard 2011(?); then llumb; now Dr Girard    Constipation     COVID-19 vaccine dose declined 01/2024    boosters    COVID-19 virus infection     regeneron (12/21); paxlovid (6/22); paxlo (1/24)    Degenerative disc disease, cervical 2009    C5-6 Dr Canales    Fibromyalgia 2019    Dr Mathews    Gastritis 12/2012    Dr Mas 10/21    GERD (gastroesophageal reflux disease) 2009    , last EGD 12/12    H/O bone graft 05/22/2017    Dental bone graft for dental implant    Hydronephrosis of right kidney     Dr Harden    Hyperlipidemia     Hypertension     Hypothyroidism     Dr Nguyen     IFG (impaired fasting glucose) 12/2020    Interstitial cystitis     possible per gyn    Lumbar degenerative disc disease 01/2014    L4-5 mild degen disc w mild central stenosis    Migraine     Dr. Canales, saw Dr. Go also;

## 2024-07-30 NOTE — PROGRESS NOTES
Jerrica Owen presents today for   Chief Complaint   Patient presents with    6 Month Follow-Up    Hypertension       \"Have you been to the ER, urgent care clinic since your last visit?  Hospitalized since your last visit?\"    NO    “Have you seen or consulted any other health care providers outside of [facility] since your last visit?”    NO

## 2024-08-01 ENCOUNTER — HOSPITAL ENCOUNTER (OUTPATIENT)
Facility: HOSPITAL | Age: 54
Discharge: HOME | End: 2024-08-01
Payer: COMMERCIAL

## 2024-08-01 DIAGNOSIS — M79.675 PAIN OF TOE OF LEFT FOOT: ICD-10-CM

## 2024-08-01 PROCEDURE — 73620 X-RAY EXAM OF FOOT: CPT

## 2024-08-07 ENCOUNTER — TELEPHONE (OUTPATIENT)
Facility: CLINIC | Age: 54
End: 2024-08-07

## 2024-08-07 NOTE — TELEPHONE ENCOUNTER
Regarding: X-ray Left foot (toe pain)  Contact: [phone]  It is still quite painful and more swollen than when I saw Dr Corcoran.  Could it be gout even though not my great toe? Whatever it is, it smarts.

## 2024-09-04 ENCOUNTER — OFFICE VISIT (OUTPATIENT)
Age: 54
End: 2024-09-04
Payer: COMMERCIAL

## 2024-09-04 VITALS
TEMPERATURE: 97.8 F | HEART RATE: 58 BPM | BODY MASS INDEX: 31.56 KG/M2 | OXYGEN SATURATION: 99 % | SYSTOLIC BLOOD PRESSURE: 106 MMHG | WEIGHT: 189.4 LBS | HEIGHT: 65 IN | DIASTOLIC BLOOD PRESSURE: 71 MMHG

## 2024-09-04 DIAGNOSIS — M47.816 LUMBAR FACET ARTHROPATHY: Primary | ICD-10-CM

## 2024-09-04 DIAGNOSIS — M48.061 SPINAL STENOSIS OF LUMBAR REGION WITHOUT NEUROGENIC CLAUDICATION: ICD-10-CM

## 2024-09-04 DIAGNOSIS — M54.16 RADICULOPATHY, LUMBAR REGION: ICD-10-CM

## 2024-09-04 DIAGNOSIS — M62.838 MUSCLE SPASM: ICD-10-CM

## 2024-09-04 DIAGNOSIS — M79.605 LEFT LEG PAIN: ICD-10-CM

## 2024-09-04 PROCEDURE — 3074F SYST BP LT 130 MM HG: CPT | Performed by: PHYSICAL MEDICINE & REHABILITATION

## 2024-09-04 PROCEDURE — 3078F DIAST BP <80 MM HG: CPT | Performed by: PHYSICAL MEDICINE & REHABILITATION

## 2024-09-04 PROCEDURE — 99214 OFFICE O/P EST MOD 30 MIN: CPT | Performed by: PHYSICAL MEDICINE & REHABILITATION

## 2024-09-04 NOTE — PROGRESS NOTES
Jerrica Chang presents today for   Chief Complaint   Patient presents with    Back Pain     Back pain is worse since last visit  pain down right side to mid thigh       Is someone accompanying this pt? no    Is the patient using any DME equipment during OV? no          Coordination of Care:  1. Have you been to the ER, urgent care clinic since your last visit? no  Hospitalized since your last visit? no    2. Have you seen or consulted any other health care providers outside of the [facility] since your last visit? no Include any pap smears or colon screening. no    
arthropathy and ligamentum flavum buckling. Minimal canal stenosis, mostly related to no significant lateral recess effacement. Mild foraminal stenoses.       Cervical 2V x-rays from 04/29/24 was personally reviewed with the patient and demonstrated:  Shows mild degenerative facets of the lower cervical region, and straightening of the cervical spine.      Lumbar spine 2V x-rays from 09/27/2022 were personally reviewed and demonstrated:  Multi-level degenerative facets, mild ddd L5/S1      AP Pelvis X-rays from 11/12/2019 were personally reviewed and demonstrated:   IUD is present, no significant degenerative changes.     Cervical spine MRI from 09/26/2013 was personally reviewed and demonstrated:   FINDINGS:       The visualized posterior fossa contents look normal. The prevertebral soft   tissues look normal.       The cervical cord is normal in signal and caliber.       There is mild reversal of the normal cervical lordosis. No significant   listhesis. Vertebral body heights are maintained. No pathologic signal   abnormality within the bone marrow. There is mild edema in the posterior soft   tissues in the region of the upper cervical spine, reference sagittal inversion   recovery image   7.       Disc heights are preserved.       C1-2: Normal       C2-3: Normal       C3-4: Normal       C4-5: Minimal posterior disc bulge. No significant foraminal or canal stenosis.           C5-6: Mild uncovertebral and facet arthropathy, without significant canal or   foraminal stenosis.       C6-7: Minimal posterior disc bulge. Mild uncovertebral and facet arthropathy.   Posterior ligamentous thickening. Canal remains patent. Mild left foraminal   narrowing.       C7-T1: Normal       IMPRESSION:   Mild multilevel degenerative changes, without critical canal stenosis. Mild   left foraminal narrowing at C6-7.       Mild edema in the posterior soft tissues of the upper cervical spine may   represent mild soft tissue injury. No

## 2024-09-07 DIAGNOSIS — M62.838 OTHER MUSCLE SPASM: ICD-10-CM

## 2024-09-09 RX ORDER — METHOCARBAMOL 500 MG/1
TABLET, FILM COATED ORAL
Qty: 60 TABLET | Refills: 2 | Status: SHIPPED | OUTPATIENT
Start: 2024-09-09

## 2024-09-17 ENCOUNTER — HOSPITAL ENCOUNTER (OUTPATIENT)
Facility: HOSPITAL | Age: 54
Discharge: HOME | End: 2024-09-20
Payer: COMMERCIAL

## 2024-09-17 VITALS
OXYGEN SATURATION: 95 % | TEMPERATURE: 98.7 F | RESPIRATION RATE: 18 BRPM | HEART RATE: 55 BPM | SYSTOLIC BLOOD PRESSURE: 146 MMHG | DIASTOLIC BLOOD PRESSURE: 81 MMHG

## 2024-09-17 LAB — HCG UR QL: NEGATIVE

## 2024-09-17 PROCEDURE — 6360000004 HC RX CONTRAST MEDICATION: Performed by: PHYSICAL MEDICINE & REHABILITATION

## 2024-09-17 PROCEDURE — 81025 URINE PREGNANCY TEST: CPT

## 2024-09-17 PROCEDURE — 2500000003 HC RX 250 WO HCPCS: Performed by: PHYSICAL MEDICINE & REHABILITATION

## 2024-09-17 PROCEDURE — 64493 INJ PARAVERT F JNT L/S 1 LEV: CPT

## 2024-09-17 PROCEDURE — 64493 INJ PARAVERT F JNT L/S 1 LEV: CPT | Performed by: PHYSICAL MEDICINE & REHABILITATION

## 2024-09-17 PROCEDURE — 6370000000 HC RX 637 (ALT 250 FOR IP): Performed by: PHYSICAL MEDICINE & REHABILITATION

## 2024-09-17 PROCEDURE — 6360000002 HC RX W HCPCS: Performed by: PHYSICAL MEDICINE & REHABILITATION

## 2024-09-17 RX ORDER — LIDOCAINE HYDROCHLORIDE 10 MG/ML
30 INJECTION, SOLUTION EPIDURAL; INFILTRATION; INTRACAUDAL; PERINEURAL ONCE
Status: COMPLETED | OUTPATIENT
Start: 2024-09-17 | End: 2024-09-17

## 2024-09-17 RX ORDER — DIAZEPAM 5 MG
5 TABLET ORAL ONCE
Status: COMPLETED | OUTPATIENT
Start: 2024-09-17 | End: 2024-09-17

## 2024-09-17 RX ORDER — DEXAMETHASONE SODIUM PHOSPHATE 10 MG/ML
10 INJECTION, SOLUTION INTRAMUSCULAR; INTRAVENOUS ONCE
Status: COMPLETED | OUTPATIENT
Start: 2024-09-17 | End: 2024-09-17

## 2024-09-17 RX ORDER — DIAZEPAM 5 MG
2.5 TABLET ORAL ONCE
Status: COMPLETED | OUTPATIENT
Start: 2024-09-17 | End: 2024-09-17

## 2024-09-17 RX ORDER — DIAZEPAM 5 MG
10 TABLET ORAL ONCE
Status: COMPLETED | OUTPATIENT
Start: 2024-09-17 | End: 2024-09-17

## 2024-09-17 RX ORDER — IOPAMIDOL 408 MG/ML
4 INJECTION, SOLUTION INTRATHECAL
Status: COMPLETED | OUTPATIENT
Start: 2024-09-17 | End: 2024-09-17

## 2024-09-17 RX ADMIN — DIAZEPAM 5 MG: 5 TABLET ORAL at 13:15

## 2024-09-17 RX ADMIN — IOPAMIDOL 1 ML: 408 INJECTION, SOLUTION INTRATHECAL at 13:43

## 2024-09-17 RX ADMIN — DEXAMETHASONE SODIUM PHOSPHATE 10 MG: 10 INJECTION, SOLUTION INTRAMUSCULAR; INTRAVENOUS at 13:44

## 2024-09-17 RX ADMIN — LIDOCAINE HYDROCHLORIDE 10 ML: 10 INJECTION, SOLUTION EPIDURAL; INFILTRATION; INTRACAUDAL; PERINEURAL at 13:39

## 2024-09-17 ASSESSMENT — PAIN SCALES - GENERAL: PAINLEVEL_OUTOF10: 2

## 2024-09-17 ASSESSMENT — PAIN - FUNCTIONAL ASSESSMENT: PAIN_FUNCTIONAL_ASSESSMENT: 0-10

## 2024-10-30 ENCOUNTER — TELEPHONE (OUTPATIENT)
Facility: CLINIC | Age: 54
End: 2024-10-30

## 2024-10-30 DIAGNOSIS — R60.9 EDEMA, UNSPECIFIED TYPE: Primary | ICD-10-CM

## 2024-10-30 RX ORDER — FUROSEMIDE 20 MG/1
20 TABLET ORAL DAILY
Qty: 30 TABLET | Refills: 0 | Status: SHIPPED | OUTPATIENT
Start: 2024-10-30

## 2024-10-30 NOTE — TELEPHONE ENCOUNTER
Pt is experiencing swelling in both legs no pain no discoloration   Pt is traveling in [place] until 11/3/2024 and does not have any compression socks or medication and wants to know what to do.     Please advise.     Call back [facility]     Pharmacy   [id number] - [place], [place] - [address] - P [phone] - F [phone] [[id number]]

## 2024-10-30 NOTE — TELEPHONE ENCOUNTER
Pls confirm she is taking hctz    If yes, we can change to lasix while traveling and swelling up    Change back to hctz once resolved    Lasix 20qd sent to pharmacy in [place]

## 2024-10-30 NOTE — TELEPHONE ENCOUNTER
Called and spoke to patient, relayed message from Dr. Corcoran.     Patient asked if it is okay that she not take potassium with the lasix? She did not bring it with her so if she needs to take it a prescription would need to be sent to the same pharmacy.    Please review and advise.

## 2024-10-31 NOTE — TELEPHONE ENCOUNTER
Patient is out of town until Sunday, does she need a short prescription for the potassium while she takes the lasix?    If needed please send short supply to pharmacy in [place], [place]

## 2024-10-31 NOTE — TELEPHONE ENCOUNTER
Don't see her taking k on our list?  Losartan usually can keep k under control with hctz but might need w lasix

## 2024-11-01 RX ORDER — POTASSIUM CHLORIDE 750 MG/1
10 TABLET, EXTENDED RELEASE ORAL DAILY
Qty: 30 TABLET | Refills: 2 | Status: SHIPPED | OUTPATIENT
Start: 2024-11-01

## 2024-11-14 NOTE — PROGRESS NOTES
[facility]  [address] [place] [address]., [address]  [place] [address]  Phone: [phone]  Fax: [phone]    Patient's YOB: 1970    ASSESSMENT   Jerrica was seen today for back pain.    Diagnoses and all orders for this visit:    Lumbar facet arthropathy    Muscle spasm  -     methocarbamol (ROBAXIN) 500 MG tablet; TAKE 1 TABLET BY MOUTH 2 TO 3 TIMES DAILY AS NEEDED FOR MUSCLE SPASM    Spinal stenosis of lumbar region without neurogenic claudication         IMPRESSION AND PLAN:  Jerrica Owen is a 54 y.o. female with history of cervical and lumbar pain and lumbar spinal stenosis. Patient is currently taking Robaxin 500 mg for muscle spasms and ibuprofen 800 mg for inflammation as needed. She also reports taking the OTC medication Aleve for pain. Patient attended PT and received dry needling in the past with improvement. Hemoglobin A1C W/O EAG from 07/24/2024 yielded a result of 5.4.      Patient was given information on muscle conditioning exercises.   Patient given refill for Robaxin 500 which she takes twice per day  Patient encouraged to have a home stretching program and chair yoga was suggested  Ms. Owen has a reminder for a \"due or due soon\" health maintenance. I have asked that she contact her primary care provider, Freddie Corcoran MD, for follow-up on this health maintenance.   demonstrated consistency with prescribing.   May consider RFA if pain returns or does not respond to conservative measures    Return in about 6 months (around 5/19/2025) for Medication follow up.      HISTORY OF PRESENT ILLNESS:  Jerrica Owen is a 54 y.o.  female who presents to the office today for facet follow up. At the time of her last OV with me (09/04/2024), Patient was ordered a Bilateral L5/S1 facet injections.    Patient is here for lumbar facet injection follow-up.  She states it did take several weeks but she was able to get relief for her lumbar pain.  She

## 2024-11-19 ENCOUNTER — OFFICE VISIT (OUTPATIENT)
Age: 54
End: 2024-11-19
Payer: COMMERCIAL

## 2024-11-19 VITALS
SYSTOLIC BLOOD PRESSURE: 116 MMHG | OXYGEN SATURATION: 98 % | HEIGHT: 65 IN | TEMPERATURE: 97.8 F | HEART RATE: 60 BPM | WEIGHT: 201.8 LBS | DIASTOLIC BLOOD PRESSURE: 66 MMHG | BODY MASS INDEX: 33.62 KG/M2

## 2024-11-19 DIAGNOSIS — M48.061 SPINAL STENOSIS OF LUMBAR REGION WITHOUT NEUROGENIC CLAUDICATION: ICD-10-CM

## 2024-11-19 DIAGNOSIS — M47.816 LUMBAR FACET ARTHROPATHY: Primary | ICD-10-CM

## 2024-11-19 DIAGNOSIS — M62.838 MUSCLE SPASM: ICD-10-CM

## 2024-11-19 PROCEDURE — 3078F DIAST BP <80 MM HG: CPT | Performed by: PHYSICAL MEDICINE & REHABILITATION

## 2024-11-19 PROCEDURE — 3074F SYST BP LT 130 MM HG: CPT | Performed by: PHYSICAL MEDICINE & REHABILITATION

## 2024-11-19 PROCEDURE — 99213 OFFICE O/P EST LOW 20 MIN: CPT | Performed by: PHYSICAL MEDICINE & REHABILITATION

## 2024-11-19 RX ORDER — METHOCARBAMOL 500 MG/1
TABLET, FILM COATED ORAL
Qty: 60 TABLET | Refills: 5 | Status: SHIPPED | OUTPATIENT
Start: 2024-11-19

## 2024-11-19 NOTE — PROGRESS NOTES
Jerrica Owen presents today for   Chief Complaint   Patient presents with    Back Pain     Back pain better since last visit  William Don took awhile to kick in but did help       Is someone accompanying this pt? no    Is the patient using any DME equipment during OV? no    Coordination of Care:  1. Have you been to the ER, urgent care clinic since your last visit? no  Hospitalized since your last visit? no    2. Have you seen or consulted any other health care providers outside of the [facility] since your last visit? no Include any pap smears or colon screening. no

## 2024-12-04 DIAGNOSIS — L50.9 HIVES: ICD-10-CM

## 2024-12-07 RX ORDER — HYDROXYZINE HYDROCHLORIDE 25 MG/1
25 TABLET, FILM COATED ORAL EVERY 8 HOURS PRN
Qty: 30 TABLET | Refills: 1 | Status: SHIPPED | OUTPATIENT
Start: 2024-12-07 | End: 2025-01-06

## 2024-12-10 ENCOUNTER — PATIENT MESSAGE (OUTPATIENT)
Facility: CLINIC | Age: 54
End: 2024-12-10

## 2024-12-10 DIAGNOSIS — E66.9 OBESITY (BMI 30-39.9): Primary | ICD-10-CM

## 2024-12-10 NOTE — TELEPHONE ENCOUNTER
Wegovy 0.25 sent  Can inc to 0.5 then 1.0 every month  She got to 1.7mg in the past       Diagnosis Orders   1. Obesity (BMI 30-39.9)  Semaglutide-Weight Management (WEGOVY) 0.25 MG/0.5ML SOAJ SC injection

## 2024-12-16 NOTE — TELEPHONE ENCOUNTER
CANNOT be sent as ozempic  You need dx of DM for that to apply    If not diabetic, have to send as wegovy

## 2025-01-16 ENCOUNTER — PATIENT MESSAGE (OUTPATIENT)
Facility: CLINIC | Age: 55
End: 2025-01-16

## 2025-01-16 DIAGNOSIS — R73.01 IFG (IMPAIRED FASTING GLUCOSE): ICD-10-CM

## 2025-01-16 DIAGNOSIS — E66.9 OBESITY (BMI 30-39.9): ICD-10-CM

## 2025-01-24 ENCOUNTER — HOSPITAL ENCOUNTER (OUTPATIENT)
Facility: HOSPITAL | Age: 55
Setting detail: SPECIMEN
Discharge: HOME | End: 2025-01-27
Payer: COMMERCIAL

## 2025-01-24 DIAGNOSIS — R73.01 IFG (IMPAIRED FASTING GLUCOSE): ICD-10-CM

## 2025-01-24 DIAGNOSIS — I10 PRIMARY HYPERTENSION: ICD-10-CM

## 2025-01-24 DIAGNOSIS — E78.5 HYPERLIPIDEMIA, UNSPECIFIED HYPERLIPIDEMIA TYPE: ICD-10-CM

## 2025-01-24 LAB
BASOPHILS # BLD: 0.09 K/UL (ref 0–0.1)
BASOPHILS NFR BLD: 1.5 % (ref 0–2)
CHOLEST SERPL-MCNC: 168 MG/DL
DIFFERENTIAL METHOD BLD: ABNORMAL
EOSINOPHIL # BLD: 0.33 K/UL (ref 0–0.4)
EOSINOPHIL NFR BLD: 5.4 % (ref 0–5)
ERYTHROCYTE [DISTWIDTH] IN BLOOD BY AUTOMATED COUNT: 12.5 % (ref 11.6–14.5)
HBA1C MFR BLD: 5.6 % (ref 4.2–5.6)
HCT VFR BLD AUTO: 39.3 % (ref 35–45)
HDLC SERPL-MCNC: 58 MG/DL (ref 40–60)
HDLC SERPL: 2.9 (ref 0–5)
HGB BLD-MCNC: 12.5 G/DL (ref 12–16)
IMM GRANULOCYTES # BLD AUTO: 0.01 K/UL (ref 0–0.04)
IMM GRANULOCYTES NFR BLD AUTO: 0.2 % (ref 0–0.5)
LDLC SERPL CALC-MCNC: 81.8 MG/DL (ref 0–100)
LIPID PANEL: NORMAL
LYMPHOCYTES # BLD: 2.27 K/UL (ref 0.9–3.6)
LYMPHOCYTES NFR BLD: 36.9 % (ref 21–52)
MCH RBC QN AUTO: 31.6 PG (ref 24–34)
MCHC RBC AUTO-ENTMCNC: 31.8 G/DL (ref 31–37)
MCV RBC AUTO: 99.5 FL (ref 78–100)
MONOCYTES # BLD: 0.61 K/UL (ref 0.05–1.2)
MONOCYTES NFR BLD: 9.9 % (ref 3–10)
NEUTS SEG # BLD: 2.85 K/UL (ref 1.8–8)
NEUTS SEG NFR BLD: 46.1 % (ref 40–73)
NRBC # BLD: 0 K/UL (ref 0–0.01)
NRBC BLD-RTO: 0 PER 100 WBC
PLATELET # BLD AUTO: 388 K/UL (ref 135–420)
PMV BLD AUTO: 9.4 FL (ref 9.2–11.8)
RBC # BLD AUTO: 3.95 M/UL (ref 4.2–5.3)
TRIGL SERPL-MCNC: 141 MG/DL
VLDLC SERPL CALC-MCNC: 28.2 MG/DL
WBC # BLD AUTO: 6.2 K/UL (ref 4.6–13.2)

## 2025-01-24 PROCEDURE — 85025 COMPLETE CBC W/AUTO DIFF WBC: CPT

## 2025-01-24 PROCEDURE — 83036 HEMOGLOBIN GLYCOSYLATED A1C: CPT

## 2025-01-24 PROCEDURE — 36415 COLL VENOUS BLD VENIPUNCTURE: CPT

## 2025-01-24 PROCEDURE — 80061 LIPID PANEL: CPT

## 2025-02-03 ENCOUNTER — OFFICE VISIT (OUTPATIENT)
Facility: CLINIC | Age: 55
End: 2025-02-03
Payer: COMMERCIAL

## 2025-02-03 VITALS
OXYGEN SATURATION: 99 % | DIASTOLIC BLOOD PRESSURE: 77 MMHG | HEART RATE: 58 BPM | BODY MASS INDEX: 34.32 KG/M2 | TEMPERATURE: 97 F | HEIGHT: 65 IN | WEIGHT: 206 LBS | RESPIRATION RATE: 16 BRPM | SYSTOLIC BLOOD PRESSURE: 109 MMHG

## 2025-02-03 DIAGNOSIS — M85.80 OSTEOPENIA, UNSPECIFIED LOCATION: ICD-10-CM

## 2025-02-03 DIAGNOSIS — R73.01 IFG (IMPAIRED FASTING GLUCOSE): ICD-10-CM

## 2025-02-03 DIAGNOSIS — I10 PRIMARY HYPERTENSION: ICD-10-CM

## 2025-02-03 DIAGNOSIS — M25.551 RIGHT HIP PAIN: Primary | ICD-10-CM

## 2025-02-03 DIAGNOSIS — E03.9 ACQUIRED HYPOTHYROIDISM: ICD-10-CM

## 2025-02-03 DIAGNOSIS — E78.5 HYPERLIPIDEMIA, UNSPECIFIED HYPERLIPIDEMIA TYPE: ICD-10-CM

## 2025-02-03 PROCEDURE — 3078F DIAST BP <80 MM HG: CPT | Performed by: INTERNAL MEDICINE

## 2025-02-03 PROCEDURE — 3074F SYST BP LT 130 MM HG: CPT | Performed by: INTERNAL MEDICINE

## 2025-02-03 PROCEDURE — 99214 OFFICE O/P EST MOD 30 MIN: CPT | Performed by: INTERNAL MEDICINE

## 2025-02-03 SDOH — ECONOMIC STABILITY: FOOD INSECURITY: WITHIN THE PAST 12 MONTHS, YOU WORRIED THAT YOUR FOOD WOULD RUN OUT BEFORE YOU GOT MONEY TO BUY MORE.: NEVER TRUE

## 2025-02-03 SDOH — ECONOMIC STABILITY: FOOD INSECURITY: WITHIN THE PAST 12 MONTHS, THE FOOD YOU BOUGHT JUST DIDN'T LAST AND YOU DIDN'T HAVE MONEY TO GET MORE.: NEVER TRUE

## 2025-02-03 ASSESSMENT — PATIENT HEALTH QUESTIONNAIRE - PHQ9
SUM OF ALL RESPONSES TO PHQ QUESTIONS 1-9: 0
SUM OF ALL RESPONSES TO PHQ QUESTIONS 1-9: 0
SUM OF ALL RESPONSES TO PHQ9 QUESTIONS 1 & 2: 0
SUM OF ALL RESPONSES TO PHQ QUESTIONS 1-9: 0
SUM OF ALL RESPONSES TO PHQ QUESTIONS 1-9: 0
2. FEELING DOWN, DEPRESSED OR HOPELESS: NOT AT ALL
1. LITTLE INTEREST OR PLEASURE IN DOING THINGS: NOT AT ALL

## 2025-02-03 NOTE — PROGRESS NOTES
Jerrica Owen presents today for   Chief Complaint   Patient presents with    6 Month Follow-Up    Hypertension       \"Have you been to the ER, urgent care clinic since your last visit?  Hospitalized since your last visit?\"    NO    “Have you seen or consulted any other health care providers outside of [facility] since your last visit?”    YES - When: approximately 3 days ago.  Where and Why: Dental, implants.             
Nucleated RBCs 0.0 0  WBC    nRBC 0.00 0.00 - 0.01 K/uL    Neutrophils % 46.1 40.0 - 73.0 %    Lymphocytes % 36.9 21.0 - 52.0 %    Monocytes % 9.9 3.0 - 10.0 %    Eosinophils % 5.4 (H) 0.0 - 5.0 %    Basophils % 1.5 0.0 - 2.0 %    Immature Granulocytes % 0.2 0.0 - 0.5 %    Neutrophils Absolute 2.85 1.80 - 8.00 K/UL    Lymphocytes Absolute 2.27 0.90 - 3.60 K/UL    Monocytes Absolute 0.61 0.05 - 1.20 K/UL    Eosinophils Absolute 0.33 0.00 - 0.40 K/UL    Basophils Absolute 0.09 0.00 - 0.10 K/UL    Immature Granulocytes Absolute 0.01 0.00 - 0.04 K/UL    Differential Type AUTOMATED     HEMOGLOBIN A1C W/O EAG   Result Value Ref Range    Hemoglobin A1C 5.6 4.2 - 5.6 %   Lipid Panel   Result Value Ref Range    LIPID PANEL        Cholesterol, Total 168 <200 MG/DL    Triglycerides 141 <150 MG/DL    HDL 58 40 - 60 MG/DL    LDL Cholesterol 81.8 0 - 100 MG/DL    VLDL Cholesterol Calculated 28.2 MG/DL    Chol/HDL Ratio 2.9 0 - 5.0       We reviewed the patient's labs from the last several visits to point out trends in the numbers        Patient Active Problem List   Diagnosis    Primary hypertension    Acquired hypothyroidism    Migraines    Thyroid nodule    Obesity (BMI 30-39.9)    Bulimia    Bilateral leg edema    Asymptomatic varicose veins    Osteopenia    DJD (degenerative joint disease)    GERD without esophagitis    IFG (impaired fasting glucose)    Hyperlipidemia         ASSESSMENT AND PLAN:  1.  Psychiatric.  Follow up Dr Girard  2.  Migraines. Continue current and f/u Dr. Stevenson's group  3.  Obesity. IFG.   We talked about online access for semaglutide again  4.  GERD and constipation, gastritis.  F/U Dr Mas, ppi  5.  Osteopenia.  On ca/d, encouraged wt bearing exercises  6.  Edema.  Prn diuretic  7.  Endocrine/gyn.  F/U Dr. Nguyen  8.  HTN. Continue current  9.  Iron def anemia.  Follow up heme and gi  10.  Hip pain.  Referred to Dr Lyle         RTC 7/25    Above conditions discussed at length and patient

## 2025-03-12 DIAGNOSIS — I10 PRIMARY HYPERTENSION: ICD-10-CM

## 2025-03-17 DIAGNOSIS — I10 PRIMARY HYPERTENSION: ICD-10-CM

## 2025-03-17 RX ORDER — LOSARTAN POTASSIUM 50 MG/1
50 TABLET ORAL DAILY
Qty: 90 TABLET | Refills: 3 | Status: SHIPPED | OUTPATIENT
Start: 2025-03-17

## 2025-03-17 NOTE — TELEPHONE ENCOUNTER
PCP: Freddie Corcoran MD    LAST OFFICE VISIT: 02/03/2025    LAST REFILL PER CHART:  Medication:losartan (COZAAR) 50 MG tablet   Ordered On:07/30/2024  Instructions:Take 1 tablet by mouth daily   Dispense:90 tablets  Refills:1      Future Appointments   Date Time Provider Department Center   5/12/2025  9:00 AM Jenise Loredo MD [facility]   7/28/2025  9:00 AM [facility] LAB VISIT [facility]   8/4/2025  9:40 AM Freddie Corcoran MD [facility]

## 2025-05-04 NOTE — PROGRESS NOTES
[facility]  [address] [place] [address]., [address]  [place] [address]  Phone: [phone]  Fax: [phone]    Patient's YOB: 1970    ASSESSMENT   Jerrica was seen today for back pain.    Diagnoses and all orders for this visit:    Lumbar facet arthropathy    Muscle spasm  -     methocarbamol (ROBAXIN-750) 750 MG tablet; Take 1 tab by mouth BID-TID prn muscle spasm    Tear of right acetabular labrum, initial encounter    Spinal stenosis of lumbar region without neurogenic claudication    Myofascial pain         IMPRESSION AND PLAN:  Jerrica Owen is a 54 y.o. female with history of GERD, PAIGE, HTN, and fibromyalgia. Pt complains of lumbar pain. Since last visit, pt feels her symptoms have improved. Patient is currently taking Robaxin 500mg BID-TID.    Patient was educated on the benefits of intermittent fasting.   Recommended patient obtain a [employer] phone stand.   Reviewed Right Hip MRI WO Contrast findings from 3/1/25 with patient.   Rx Robaxin 750mg BID-TID as needed for muscle spasms. Increased from 500mg for greater relief.   Ms. Owen has a reminder for a \"due or due soon\" health maintenance. I have asked that she contact her primary care provider, Freddie Corcoran MD, for follow-up on this health maintenance.   demonstrated consistency with prescribing.     Return in about 6 months (around 11/12/2025) for Medication follow up.      HISTORY OF PRESENT ILLNESS:  Jerrica Owen is a 54 y.o. female who presents to the office today for a medication follow up. At her last OV (11/19/24), Rf Robaxin 500mg BID-TID.    Patient presents here for a medication follow-up. Patient states her back pain has improved but now c/o anterior right hip pain. Patient is followed by Dr. Mcmullen and has received bursa injections, which provided only short-term relief. She is scheduled for a follow-up on Friday and notes that a referral to Dr. Woods has been

## 2025-05-07 RX ORDER — HYDROCHLOROTHIAZIDE 25 MG/1
25 TABLET ORAL DAILY
Qty: 90 TABLET | Refills: 3 | Status: SHIPPED | OUTPATIENT
Start: 2025-05-07

## 2025-05-12 ENCOUNTER — OFFICE VISIT (OUTPATIENT)
Age: 55
End: 2025-05-12
Payer: COMMERCIAL

## 2025-05-12 VITALS
DIASTOLIC BLOOD PRESSURE: 76 MMHG | WEIGHT: 195.6 LBS | HEIGHT: 65 IN | TEMPERATURE: 97.9 F | BODY MASS INDEX: 32.59 KG/M2 | OXYGEN SATURATION: 99 % | HEART RATE: 78 BPM | SYSTOLIC BLOOD PRESSURE: 120 MMHG

## 2025-05-12 DIAGNOSIS — M79.18 MYOFASCIAL PAIN: ICD-10-CM

## 2025-05-12 DIAGNOSIS — M62.838 MUSCLE SPASM: ICD-10-CM

## 2025-05-12 DIAGNOSIS — M47.816 LUMBAR FACET ARTHROPATHY: Primary | ICD-10-CM

## 2025-05-12 DIAGNOSIS — S73.191A TEAR OF RIGHT ACETABULAR LABRUM, INITIAL ENCOUNTER: ICD-10-CM

## 2025-05-12 DIAGNOSIS — M48.061 SPINAL STENOSIS OF LUMBAR REGION WITHOUT NEUROGENIC CLAUDICATION: ICD-10-CM

## 2025-05-12 PROCEDURE — 3078F DIAST BP <80 MM HG: CPT | Performed by: PHYSICAL MEDICINE & REHABILITATION

## 2025-05-12 PROCEDURE — 3074F SYST BP LT 130 MM HG: CPT | Performed by: PHYSICAL MEDICINE & REHABILITATION

## 2025-05-12 PROCEDURE — 99213 OFFICE O/P EST LOW 20 MIN: CPT | Performed by: PHYSICAL MEDICINE & REHABILITATION

## 2025-05-12 RX ORDER — METHOCARBAMOL 750 MG/1
TABLET, FILM COATED ORAL
Qty: 90 TABLET | Refills: 2 | Status: SHIPPED | OUTPATIENT
Start: 2025-05-12

## 2025-05-12 NOTE — PROGRESS NOTES
Jerrica Owen presents today for   Chief Complaint   Patient presents with    Back Pain     LBP is better today       Is someone accompanying this pt? no    Is the patient using any DME equipment during OV? no        Coordination of Care:  1. Have you been to the ER, urgent care clinic since your last visit? no  Hospitalized since your last visit? no    2. Have you seen or consulted any other health care providers outside of the [facility] since your last visit? no Include any pap smears or colon screening. no

## 2025-05-14 ENCOUNTER — TELEPHONE (OUTPATIENT)
Facility: CLINIC | Age: 55
End: 2025-05-14

## 2025-05-14 DIAGNOSIS — L29.9 PRURITUS: Primary | ICD-10-CM

## 2025-05-14 RX ORDER — HYDROXYZINE HYDROCHLORIDE 25 MG/1
25 TABLET, FILM COATED ORAL EVERY 8 HOURS PRN
Qty: 90 TABLET | Refills: 1 | Status: SHIPPED | OUTPATIENT
Start: 2025-05-14

## 2025-05-14 NOTE — TELEPHONE ENCOUNTER
Last OV: 2/3/2025  Next OV: 8/19/2025  Last refill: not on current med list      Patient is requesting a refill on   Hydroxyzine HCL 25 mg    1 tab PO every 8 hours as needed for itching.

## 2025-07-28 ENCOUNTER — HOSPITAL ENCOUNTER (OUTPATIENT)
Facility: HOSPITAL | Age: 55
Setting detail: SPECIMEN
Discharge: HOME | End: 2025-07-31
Payer: COMMERCIAL

## 2025-07-28 DIAGNOSIS — R73.01 IFG (IMPAIRED FASTING GLUCOSE): ICD-10-CM

## 2025-07-28 LAB
ALBUMIN SERPL-MCNC: 3.7 G/DL (ref 3.4–5)
ALBUMIN/GLOB SERPL: 1.6 (ref 0.8–1.7)
ALP SERPL-CCNC: 42 U/L (ref 45–117)
ALT SERPL-CCNC: 23 U/L (ref 10–35)
ANION GAP SERPL CALC-SCNC: 11 MMOL/L (ref 3–18)
AST SERPL-CCNC: 14 U/L (ref 10–38)
BILIRUB SERPL-MCNC: 0.4 MG/DL (ref 0.2–1)
BUN SERPL-MCNC: 15 MG/DL (ref 6–23)
BUN/CREAT SERPL: 14 (ref 12–20)
CALCIUM SERPL-MCNC: 9.3 MG/DL (ref 8.5–10.1)
CHLORIDE SERPL-SCNC: 107 MMOL/L (ref 98–107)
CO2 SERPL-SCNC: 26 MMOL/L (ref 21–32)
CREAT SERPL-MCNC: 1.06 MG/DL (ref 0.6–1.3)
GLOBULIN SER CALC-MCNC: 2.3 G/DL (ref 2–4)
GLUCOSE SERPL-MCNC: 90 MG/DL (ref 74–108)
HBA1C MFR BLD: 5.4 % (ref 4.2–5.6)
POTASSIUM SERPL-SCNC: 4.4 MMOL/L (ref 3.5–5.5)
PROT SERPL-MCNC: 5.9 G/DL (ref 6.4–8.2)
SODIUM SERPL-SCNC: 144 MMOL/L (ref 136–145)

## 2025-07-28 PROCEDURE — 83036 HEMOGLOBIN GLYCOSYLATED A1C: CPT

## 2025-07-28 PROCEDURE — 80053 COMPREHEN METABOLIC PANEL: CPT

## 2025-07-28 PROCEDURE — 36415 COLL VENOUS BLD VENIPUNCTURE: CPT

## 2025-08-08 DIAGNOSIS — I10 PRIMARY HYPERTENSION: Primary | ICD-10-CM

## 2025-08-08 RX ORDER — HYDROCHLOROTHIAZIDE 25 MG/1
25 TABLET ORAL DAILY
Qty: 90 TABLET | Refills: 3 | OUTPATIENT
Start: 2025-08-08

## 2025-08-14 RX ORDER — HYDROCHLOROTHIAZIDE 25 MG/1
25 TABLET ORAL DAILY
Qty: 90 TABLET | Refills: 1 | Status: SHIPPED | OUTPATIENT
Start: 2025-08-14

## 2025-08-19 ENCOUNTER — OFFICE VISIT (OUTPATIENT)
Facility: CLINIC | Age: 55
End: 2025-08-19
Payer: COMMERCIAL

## 2025-08-19 VITALS
RESPIRATION RATE: 16 BRPM | HEIGHT: 60 IN | WEIGHT: 190 LBS | BODY MASS INDEX: 37.3 KG/M2 | OXYGEN SATURATION: 100 % | HEART RATE: 72 BPM | TEMPERATURE: 98 F | SYSTOLIC BLOOD PRESSURE: 134 MMHG | DIASTOLIC BLOOD PRESSURE: 84 MMHG

## 2025-08-19 DIAGNOSIS — F50.20 BULIMIA NERVOSA, UNSPECIFIED SEVERITY (HCC): ICD-10-CM

## 2025-08-19 DIAGNOSIS — N95.9 MENOPAUSAL AND PERIMENOPAUSAL DISORDER: ICD-10-CM

## 2025-08-19 DIAGNOSIS — I10 PRIMARY HYPERTENSION: ICD-10-CM

## 2025-08-19 DIAGNOSIS — R73.01 IFG (IMPAIRED FASTING GLUCOSE): ICD-10-CM

## 2025-08-19 DIAGNOSIS — Z23 NEED FOR PROPHYLACTIC VACCINATION AGAINST STREPTOCOCCUS PNEUMONIAE (PNEUMOCOCCUS): ICD-10-CM

## 2025-08-19 DIAGNOSIS — M85.80 OSTEOPENIA, UNSPECIFIED LOCATION: ICD-10-CM

## 2025-08-19 DIAGNOSIS — E78.5 HYPERLIPIDEMIA, UNSPECIFIED HYPERLIPIDEMIA TYPE: ICD-10-CM

## 2025-08-19 DIAGNOSIS — E03.9 ACQUIRED HYPOTHYROIDISM: ICD-10-CM

## 2025-08-19 DIAGNOSIS — E66.9 OBESITY (BMI 30-39.9): ICD-10-CM

## 2025-08-19 DIAGNOSIS — Z00.00 PHYSICAL EXAM: Primary | ICD-10-CM

## 2025-08-19 PROCEDURE — 99396 PREV VISIT EST AGE 40-64: CPT | Performed by: INTERNAL MEDICINE

## 2025-08-19 PROCEDURE — 90471 IMMUNIZATION ADMIN: CPT | Performed by: INTERNAL MEDICINE

## 2025-08-19 PROCEDURE — 3079F DIAST BP 80-89 MM HG: CPT | Performed by: INTERNAL MEDICINE

## 2025-08-19 PROCEDURE — 90677 PCV20 VACCINE IM: CPT | Performed by: INTERNAL MEDICINE

## 2025-08-19 PROCEDURE — 3075F SYST BP GE 130 - 139MM HG: CPT | Performed by: INTERNAL MEDICINE
